# Patient Record
Sex: MALE | Race: AMERICAN INDIAN OR ALASKA NATIVE | NOT HISPANIC OR LATINO | URBAN - METROPOLITAN AREA
[De-identification: names, ages, dates, MRNs, and addresses within clinical notes are randomized per-mention and may not be internally consistent; named-entity substitution may affect disease eponyms.]

---

## 2017-08-26 ENCOUNTER — INPATIENT (INPATIENT)
Facility: HOSPITAL | Age: 62
LOS: 18 days | Discharge: HOME HEALTH | End: 2017-09-14
Attending: SURGERY | Admitting: SURGERY
Payer: MEDICAID

## 2017-08-26 VITALS
OXYGEN SATURATION: 100 % | HEIGHT: 65 IN | DIASTOLIC BLOOD PRESSURE: 86 MMHG | SYSTOLIC BLOOD PRESSURE: 137 MMHG | WEIGHT: 135.58 LBS | HEART RATE: 94 BPM | RESPIRATION RATE: 17 BRPM | TEMPERATURE: 98 F

## 2017-08-26 DIAGNOSIS — K55.9 VASCULAR DISORDER OF INTESTINE, UNSPECIFIED: ICD-10-CM

## 2017-08-26 DIAGNOSIS — Z00.00 ENCOUNTER FOR GENERAL ADULT MEDICAL EXAMINATION WITHOUT ABNORMAL FINDINGS: ICD-10-CM

## 2017-08-26 DIAGNOSIS — D72.829 ELEVATED WHITE BLOOD CELL COUNT, UNSPECIFIED: ICD-10-CM

## 2017-08-26 DIAGNOSIS — K52.9 NONINFECTIVE GASTROENTERITIS AND COLITIS, UNSPECIFIED: ICD-10-CM

## 2017-08-26 DIAGNOSIS — E11.9 TYPE 2 DIABETES MELLITUS WITHOUT COMPLICATIONS: ICD-10-CM

## 2017-08-26 DIAGNOSIS — R33.9 RETENTION OF URINE, UNSPECIFIED: ICD-10-CM

## 2017-08-26 LAB
ALBUMIN SERPL ELPH-MCNC: 3.4 G/DL — SIGNIFICANT CHANGE UP (ref 3.3–5)
ALP SERPL-CCNC: 49 U/L — SIGNIFICANT CHANGE UP (ref 40–120)
ALT FLD-CCNC: 26 U/L — SIGNIFICANT CHANGE UP (ref 4–41)
APPEARANCE UR: CLEAR — SIGNIFICANT CHANGE UP
AST SERPL-CCNC: 23 U/L — SIGNIFICANT CHANGE UP (ref 4–40)
BASE EXCESS BLDV CALC-SCNC: -0.3 MMOL/L — SIGNIFICANT CHANGE UP
BASOPHILS # BLD AUTO: 0.04 K/UL — SIGNIFICANT CHANGE UP (ref 0–0.2)
BASOPHILS NFR BLD AUTO: 0.3 % — SIGNIFICANT CHANGE UP (ref 0–2)
BILIRUB SERPL-MCNC: 0.4 MG/DL — SIGNIFICANT CHANGE UP (ref 0.2–1.2)
BILIRUB UR-MCNC: NEGATIVE — SIGNIFICANT CHANGE UP
BLOOD UR QL VISUAL: NEGATIVE — SIGNIFICANT CHANGE UP
BUN SERPL-MCNC: 6 MG/DL — LOW (ref 7–23)
CALCIUM SERPL-MCNC: 9.2 MG/DL — SIGNIFICANT CHANGE UP (ref 8.4–10.5)
CHLORIDE SERPL-SCNC: 99 MMOL/L — SIGNIFICANT CHANGE UP (ref 98–107)
CO2 SERPL-SCNC: 21 MMOL/L — LOW (ref 22–31)
COLOR SPEC: SIGNIFICANT CHANGE UP
CREAT SERPL-MCNC: 0.78 MG/DL — SIGNIFICANT CHANGE UP (ref 0.5–1.3)
EOSINOPHIL # BLD AUTO: 0.04 K/UL — SIGNIFICANT CHANGE UP (ref 0–0.5)
EOSINOPHIL NFR BLD AUTO: 0.3 % — SIGNIFICANT CHANGE UP (ref 0–6)
GAS PNL BLDV: 137 MMOL/L — SIGNIFICANT CHANGE UP (ref 136–146)
GLUCOSE BLDV-MCNC: 165 — HIGH (ref 70–99)
GLUCOSE SERPL-MCNC: 155 MG/DL — HIGH (ref 70–99)
GLUCOSE UR-MCNC: NEGATIVE — SIGNIFICANT CHANGE UP
HCO3 BLDV-SCNC: 25 MMOL/L — SIGNIFICANT CHANGE UP (ref 20–27)
HCT VFR BLD CALC: 38.2 % — LOW (ref 39–50)
HCT VFR BLDV CALC: 40.6 % — SIGNIFICANT CHANGE UP (ref 39–51)
HGB BLD-MCNC: 12.8 G/DL — LOW (ref 13–17)
HGB BLDV-MCNC: 13.2 G/DL — SIGNIFICANT CHANGE UP (ref 13–17)
IMM GRANULOCYTES # BLD AUTO: 0.05 # — SIGNIFICANT CHANGE UP
IMM GRANULOCYTES NFR BLD AUTO: 0.3 % — SIGNIFICANT CHANGE UP (ref 0–1.5)
KETONES UR-MCNC: SIGNIFICANT CHANGE UP
LACTATE SERPL-SCNC: 0.9 MMOL/L — SIGNIFICANT CHANGE UP (ref 0.5–2)
LEUKOCYTE ESTERASE UR-ACNC: NEGATIVE — SIGNIFICANT CHANGE UP
LYMPHOCYTES # BLD AUTO: 1.38 K/UL — SIGNIFICANT CHANGE UP (ref 1–3.3)
LYMPHOCYTES # BLD AUTO: 9.5 % — LOW (ref 13–44)
MAGNESIUM SERPL-MCNC: 1.8 MG/DL — SIGNIFICANT CHANGE UP (ref 1.6–2.6)
MCHC RBC-ENTMCNC: 27.9 PG — SIGNIFICANT CHANGE UP (ref 27–34)
MCHC RBC-ENTMCNC: 33.5 % — SIGNIFICANT CHANGE UP (ref 32–36)
MCV RBC AUTO: 83.2 FL — SIGNIFICANT CHANGE UP (ref 80–100)
MONOCYTES # BLD AUTO: 1.02 K/UL — HIGH (ref 0–0.9)
MONOCYTES NFR BLD AUTO: 7 % — SIGNIFICANT CHANGE UP (ref 2–14)
MUCOUS THREADS # UR AUTO: SIGNIFICANT CHANGE UP
NEUTROPHILS # BLD AUTO: 12.05 K/UL — HIGH (ref 1.8–7.4)
NEUTROPHILS NFR BLD AUTO: 82.6 % — HIGH (ref 43–77)
NITRITE UR-MCNC: NEGATIVE — SIGNIFICANT CHANGE UP
NRBC # FLD: 0 — SIGNIFICANT CHANGE UP
PCO2 BLDV: 33 MMHG — LOW (ref 41–51)
PH BLDV: 7.46 PH — HIGH (ref 7.32–7.43)
PH UR: 6 — SIGNIFICANT CHANGE UP (ref 4.6–8)
PHOSPHATE SERPL-MCNC: 3.4 MG/DL — SIGNIFICANT CHANGE UP (ref 2.5–4.5)
PLATELET # BLD AUTO: 373 K/UL — SIGNIFICANT CHANGE UP (ref 150–400)
PMV BLD: 8.8 FL — SIGNIFICANT CHANGE UP (ref 7–13)
PO2 BLDV: 76 MMHG — HIGH (ref 35–40)
POTASSIUM BLDV-SCNC: 3.2 MMOL/L — LOW (ref 3.4–4.5)
POTASSIUM SERPL-MCNC: 3.5 MMOL/L — SIGNIFICANT CHANGE UP (ref 3.5–5.3)
POTASSIUM SERPL-SCNC: 3.5 MMOL/L — SIGNIFICANT CHANGE UP (ref 3.5–5.3)
PROT SERPL-MCNC: 7.2 G/DL — SIGNIFICANT CHANGE UP (ref 6–8.3)
PROT UR-MCNC: NEGATIVE — SIGNIFICANT CHANGE UP
RBC # BLD: 4.59 M/UL — SIGNIFICANT CHANGE UP (ref 4.2–5.8)
RBC # FLD: 14 % — SIGNIFICANT CHANGE UP (ref 10.3–14.5)
RBC CASTS # UR COMP ASSIST: SIGNIFICANT CHANGE UP (ref 0–?)
SAO2 % BLDV: 95.9 % — HIGH (ref 60–85)
SODIUM SERPL-SCNC: 138 MMOL/L — SIGNIFICANT CHANGE UP (ref 135–145)
SP GR SPEC: 1.01 — SIGNIFICANT CHANGE UP (ref 1–1.03)
UROBILINOGEN FLD QL: NORMAL E.U. — SIGNIFICANT CHANGE UP (ref 0.1–0.2)
WBC # BLD: 14.58 K/UL — HIGH (ref 3.8–10.5)
WBC # FLD AUTO: 14.58 K/UL — HIGH (ref 3.8–10.5)
WBC UR QL: SIGNIFICANT CHANGE UP (ref 0–?)

## 2017-08-26 PROCEDURE — 99223 1ST HOSP IP/OBS HIGH 75: CPT | Mod: GC

## 2017-08-26 RX ORDER — DEXTROSE 50 % IN WATER 50 %
12.5 SYRINGE (ML) INTRAVENOUS ONCE
Qty: 0 | Refills: 0 | Status: DISCONTINUED | OUTPATIENT
Start: 2017-08-26 | End: 2017-09-14

## 2017-08-26 RX ORDER — INSULIN LISPRO 100/ML
VIAL (ML) SUBCUTANEOUS
Qty: 0 | Refills: 0 | Status: DISCONTINUED | OUTPATIENT
Start: 2017-08-26 | End: 2017-08-28

## 2017-08-26 RX ORDER — ACETAMINOPHEN 500 MG
650 TABLET ORAL EVERY 6 HOURS
Qty: 0 | Refills: 0 | Status: DISCONTINUED | OUTPATIENT
Start: 2017-08-26 | End: 2017-09-08

## 2017-08-26 RX ORDER — SODIUM CHLORIDE 9 MG/ML
1000 INJECTION INTRAMUSCULAR; INTRAVENOUS; SUBCUTANEOUS
Qty: 0 | Refills: 0 | Status: DISCONTINUED | OUTPATIENT
Start: 2017-08-26 | End: 2017-09-03

## 2017-08-26 RX ORDER — DEXTROSE 50 % IN WATER 50 %
1 SYRINGE (ML) INTRAVENOUS ONCE
Qty: 0 | Refills: 0 | Status: DISCONTINUED | OUTPATIENT
Start: 2017-08-26 | End: 2017-09-14

## 2017-08-26 RX ORDER — PIPERACILLIN AND TAZOBACTAM 4; .5 G/20ML; G/20ML
3.38 INJECTION, POWDER, LYOPHILIZED, FOR SOLUTION INTRAVENOUS EVERY 8 HOURS
Qty: 0 | Refills: 0 | Status: DISCONTINUED | OUTPATIENT
Start: 2017-08-26 | End: 2017-08-27

## 2017-08-26 RX ORDER — TAMSULOSIN HYDROCHLORIDE 0.4 MG/1
0.4 CAPSULE ORAL AT BEDTIME
Qty: 0 | Refills: 0 | Status: DISCONTINUED | OUTPATIENT
Start: 2017-08-26 | End: 2017-09-14

## 2017-08-26 RX ORDER — SODIUM CHLORIDE 9 MG/ML
1000 INJECTION, SOLUTION INTRAVENOUS
Qty: 0 | Refills: 0 | Status: DISCONTINUED | OUTPATIENT
Start: 2017-08-26 | End: 2017-09-12

## 2017-08-26 RX ORDER — DEXTROSE 50 % IN WATER 50 %
25 SYRINGE (ML) INTRAVENOUS ONCE
Qty: 0 | Refills: 0 | Status: DISCONTINUED | OUTPATIENT
Start: 2017-08-26 | End: 2017-09-14

## 2017-08-26 RX ORDER — INSULIN LISPRO 100/ML
VIAL (ML) SUBCUTANEOUS AT BEDTIME
Qty: 0 | Refills: 0 | Status: DISCONTINUED | OUTPATIENT
Start: 2017-08-26 | End: 2017-08-28

## 2017-08-26 RX ORDER — GLUCAGON INJECTION, SOLUTION 0.5 MG/.1ML
1 INJECTION, SOLUTION SUBCUTANEOUS ONCE
Qty: 0 | Refills: 0 | Status: DISCONTINUED | OUTPATIENT
Start: 2017-08-26 | End: 2017-09-14

## 2017-08-26 RX ADMIN — PIPERACILLIN AND TAZOBACTAM 25 GRAM(S): 4; .5 INJECTION, POWDER, LYOPHILIZED, FOR SOLUTION INTRAVENOUS at 23:12

## 2017-08-26 RX ADMIN — SODIUM CHLORIDE 75 MILLILITER(S): 9 INJECTION INTRAMUSCULAR; INTRAVENOUS; SUBCUTANEOUS at 22:40

## 2017-08-26 RX ADMIN — TAMSULOSIN HYDROCHLORIDE 0.4 MILLIGRAM(S): 0.4 CAPSULE ORAL at 22:40

## 2017-08-26 NOTE — H&P ADULT - NSHPREVIEWOFSYSTEMS_GEN_ALL_CORE
CONSTITUTIONAL:  No weight loss, fever, chills, weakness or fatigue.  HEENT:  Eyes:  No visual loss, blurred vision, double vision or yellow sclerae No hearing loss, sneezing, congestion, runny nose or sore throat.  SKIN:  No rash or itching.  CARDIOVASCULAR:  No chest pain, chest pressure or chest discomfort. No palpitations or edema.  RESPIRATORY:  No shortness of breath, cough or sputum.  GASTROINTESTINAL:  No nausea, vomiting or diarrhea/constipation. No abdominal pain, melena, hematochezia recently.   GENITOURINARY:  Denies hematuria, dysuria.   NEUROLOGICAL:  No headache, dizziness, syncope, paralysis, ataxia, numbness or tingling in the extremities. No change in bowel or bladder control.  MUSCULOSKELETAL:  No muscle, back pain, joint pain or stiffness.  HEMATOLOGIC:  No easy bleeding or bruising.  LYMPHATICS:  No enlarged nodes.   PSYCHIATRIC:  No history of depression or anxiety.  ENDOCRINOLOGIC:  No reports of sweating, cold or heat intolerance. No polyuria or polydipsia. CONSTITUTIONAL:  +weight loss of 15 pounds over 3 months, fever, chills, weakness or fatigue.  HEENT:  Eyes:  No visual loss, blurred vision, double vision or yellow sclerae No hearing loss, sneezing, congestion, runny nose or sore throat.  SKIN:  No rash or itching.  CARDIOVASCULAR:  No chest pain, chest pressure or chest discomfort. No palpitations or edema.  RESPIRATORY:  No shortness of breath, cough or sputum.  GASTROINTESTINAL:  No nausea, vomiting or diarrhea/constipation. +abdominal pain, melena. + hematochezia.   GENITOURINARY:  Denies hematuria, dysuria, +urinary hesitancy   NEUROLOGICAL:  No headache, dizziness, syncope, paralysis, ataxia, numbness or tingling in the extremities. No change in bowel or bladder control.  MUSCULOSKELETAL:  No muscle, back pain, joint pain or stiffness.  HEMATOLOGIC:  No easy bleeding or bruising.  LYMPHATICS:  No enlarged nodes.   PSYCHIATRIC:  No history of depression or anxiety.  ENDOCRINOLOGIC:  No reports of sweating, cold or heat intolerance. No polyuria or polydipsia.

## 2017-08-26 NOTE — H&P ADULT - NSHPPHYSICALEXAM_GEN_ALL_CORE
GENERAL APPEARANCE: Well developed, well nourished, alert and cooperative. NAD.   HEENT:  PERRL, EOMI. External auditory canals normal, hearing grossly intact.  NECK: Neck supple, non-tender without lymphadenopathy, masses or thyromegaly.  CARDIAC: Normal S1 and S2. No S3, S4 or murmurs. Rhythm is regular.  LUNGS: Clear to auscultation and percussion without rales, rhonchi, wheezing or diminished breath sounds.  ABDOMEN: Positive bowel sounds. Soft, nondistended, nontender. No guarding or rebound.   MUSKULOSKELETAL: ROM intact spine and extremities. No joint erythema or tenderness.   BACK: normal gait and posture, no spinal deformity, symmetry of spinal muscles, without tenderness, decreased range of motion.  EXTREMITIES: No significant deformity or joint abnormality. No edema. Peripheral pulses intact. No varicosities.  NEUROLOGICAL: CN II-XII intact. Strength and sensation symmetric and intact throughout.   SKIN: Skin normal color, texture and turgor with no lesions or eruptions.  PSYCHIATRIC: AOx3.Normal affect and behavior. GENERAL APPEARANCE: Well developed, well nourished, alert and cooperative. NAD.   HEENT:  PERRL, EOMI. External auditory canals normal, Neck supple, non-tender without lymphadenopathy, masses or thyromegaly.  CARDIAC: Normal S1 and S2. No S3, S4 or murmurs. Rhythm is regular.  LUNGS: Clear to auscultation and percussion without rales, rhonchi, wheezing or diminished breath sounds.  ABDOMEN: Positive bowel sounds. Soft, nondistended, tender in LLQ, no guarding, no rebound tenderness.   MUSKULOSKELETAL: ROM intact spine and extremities. No joint erythema or tenderness.   BACK: normal gait and posture, no spinal deformity, symmetry of spinal muscles, without tenderness, decreased range of motion.  EXTREMITIES: No significant deformity or joint abnormality. No edema. Peripheral pulses intact. No varicosities.  NEUROLOGICAL: CN II-XII intact. Strength and sensation symmetric and intact throughout.   SKIN: Skin normal color, texture and turgor with no lesions or eruptions.  PSYCHIATRIC: AOx3.Normal affect and behavior. Vital Signs Last 24 Hrs  T(C): 37.2 (26 Aug 2017 21:16), Max: 37.2 (26 Aug 2017 21:16)  T(F): 98.9 (26 Aug 2017 21:16), Max: 98.9 (26 Aug 2017 21:16)  HR: 105 (26 Aug 2017 21:16) (94 - 105)  BP: 130/86 (26 Aug 2017 21:16) (130/86 - 137/86)  BP(mean): --  RR: 17 (26 Aug 2017 21:16) (17 - 17)  SpO2: 99% (26 Aug 2017 21:16) (99% - 100%)    GENERAL APPEARANCE: Well developed, well nourished, alert and cooperative. NAD.   HEENT:  PERRL, EOMI. External auditory canals normal, Neck supple, non-tender without lymphadenopathy, masses or thyromegaly.  CARDIAC: Normal S1 and S2. No S3, S4 or murmurs. Rhythm is regular.  LUNGS: Clear to auscultation and percussion without rales, rhonchi, wheezing or diminished breath sounds.  ABDOMEN: Positive bowel sounds. Soft, nondistended, tender in LLQ, no guarding, no rebound tenderness.   MUSCULOSKELETAL: ROM intact spine and extremities. No joint erythema or tenderness.   BACK: normal gait and posture, no spinal deformity, symmetry of spinal muscles, without tenderness, decreased range of motion.  EXTREMITIES: No significant deformity or joint abnormality. No edema. Peripheral pulses intact. No varicosities.  NEUROLOGICAL: CN II-XII intact. Strength and sensation symmetric and intact throughout.   SKIN: Skin normal color, texture and turgor with no lesions or eruptions.  PSYCHIATRIC: AOx3.Normal affect and behavior.

## 2017-08-26 NOTE — H&P ADULT - PROBLEM SELECTOR PLAN 3
Likely due to BPH  -will follow up with urology as outpatient per [facility] notes  -continue with flomax -hold metformin  -check hgba1c in AM  -continue with sliding scale and check FS TID and qhs

## 2017-08-26 NOTE — H&P ADULT - NSHPLABSRESULTS_GEN_ALL_CORE
Vital Signs Last 24 Hrs  T(C): 37.2 (26 Aug 2017 21:16), Max: 37.2 (26 Aug 2017 21:16)  T(F): 98.9 (26 Aug 2017 21:16), Max: 98.9 (26 Aug 2017 21:16)  HR: 105 (26 Aug 2017 21:16) (94 - 105)  BP: 130/86 (26 Aug 2017 21:16) (130/86 - 137/86)  BP(mean): --  RR: 17 (26 Aug 2017 21:16) (17 - 17)  SpO2: 99% (26 Aug 2017 21:16) (99% - 100%)  CAPILLARY BLOOD GLUCOSE      LABS:                        12.8   14.58 )-----------( 373      ( 26 Aug 2017 18:37 )             38.2     Auto Eosinophil # 0.04  / Auto Eosinophil % 0.3   / Auto Neutrophil # 12.05 / Auto Neutrophil % 82.6  / BANDS % x            138  |  99  |  6<L>  ----------------------------<  155<H>  3.5   |  21<L>  |  0.78    Ca    9.2      26 Aug 2017 18:37  Mg     1.8       Phos  3.4       TPro  7.2  /  Alb  3.4  /  TBili  0.4  /  DBili  x   /  AST  23  /  ALT  26  /  AlkPhos  49        Urinalysis Basic - ( 26 Aug 2017 20:10 )    Color: PLYEL / Appearance: CLEAR / S.011 / pH: 6.0  Gluc: NEGATIVE / Ketone: SMALL  / Bili: NEGATIVE / Urobili: NORMAL E.U.   Blood: NEGATIVE / Protein: NEGATIVE / Nitrite: NEGATIVE   Leuk Esterase: NEGATIVE / RBC: 0-2 / WBC 0-2   Sq Epi: x / Non Sq Epi: x / Bacteria: x      Lactate, Blood: 0.9 mmol/L ( @ 18:37)      RADIOLOGY & ADDITIONAL TESTS:    Imaging Personally Reviewed:    Consultant(s) Notes Reviewed:      Care Discussed with Consultants/Other Providers: LABS:                        12.8   14.58 )-----------( 373      ( 26 Aug 2017 18:37 )             38.2     Auto Eosinophil # 0.04  / Auto Eosinophil % 0.3   / Auto Neutrophil # 12.05 / Auto Neutrophil % 82.6  / BANDS % x            138  |  99  |  6<L>  ----------------------------<  155<H>  3.5   |  21<L>  |  0.78    Ca    9.2      26 Aug 2017 18:37  Mg     1.8       Phos  3.4       TPro  7.2  /  Alb  3.4  /  TBili  0.4  /  DBili  x   /  AST  23  /  ALT    /  AlkPhos  49        Urinalysis Basic - ( 26 Aug 2017 20:10 )    Color: PLYEL / Appearance: CLEAR / S.011 / pH: 6.0  Gluc: NEGATIVE / Ketone: SMALL  / Bili: NEGATIVE / Urobili: NORMAL E.U.   Blood: NEGATIVE / Protein: NEGATIVE / Nitrite: NEGATIVE   Leuk Esterase: NEGATIVE / RBC: 0-2 / WBC 0-2   Sq Epi: x / Non Sq Epi: x / Bacteria: x      Lactate, Blood: 0.9 mmol/L ( @ 18:37)      RADIOLOGY & ADDITIONAL TESTS:    Reviewed medical records from [facility]

## 2017-08-26 NOTE — H&P ADULT - FAMILY HISTORY
Sibling  Still living? Unknown  Family history of diabetes mellitus in brother, Age at diagnosis: Age Unknown

## 2017-08-26 NOTE — H&P ADULT - PMH
Diabetes mellitus type 2, noninsulin dependent Diabetes mellitus type 2, noninsulin dependent    Ischemic colitis

## 2017-08-26 NOTE — H&P ADULT - PROBLEM SELECTOR PLAN 2
-hold metformin  -check hgba1c in AM  -continue with sliding scale and check FS TID and qhs Likely reactive in the setting of ischemic colitis. No signs of infection. Per [facility] notes, patient has a persistent leukocytosis from 13-14  -will monitor CBC

## 2017-08-26 NOTE — H&P ADULT - PROBLEM SELECTOR PLAN 4
DVT ppx: Pt is ambulatory. Will avoid pharmacological agents as pt also has BRBPR Likely due to BPH  -will follow up with urology as outpatient per [facility] notes  -continue with flomax

## 2017-08-26 NOTE — H&P ADULT - ASSESSMENT
62yom PMHx of DM2 (on metformin) presents as a transfer from [facility] for ischemic colitis.

## 2017-08-26 NOTE — H&P ADULT - HISTORY OF PRESENT ILLNESS
62yom PMHx of DM2 (on metformin) presents as a transfer from [facility].           Pt had a colonoscopy 7/24 for evaluation for evaluation of hemorrhoids and lower abdominal/rectal discomfort. The colonoscopy ( by Gastroenterologist Dr. Wolfe)  showed sigmoid thickening and biopsies showed findings concerning for ischemic colitis. Pt was admitted to [facility] for further evaluation on 8/16. Per chart, CT A/P showed sigmoid colitis/proctitis, grossly patent mesenteric arterial vasculature. No acute mesenteric venous thrombosis seen. On 8/17: patient had a sigmoidoscopy by Dr. Leija which showed congested erythematous friable inflamed ulcerated mucosa and biopsy of that also showed ischemic colitis and erosion. 8/20 CT A/P showed worsening colitis-no fluid collection or abscess. Per chart, patient had no melena/bloody bowel movements since admission to [facility].      Of note, CTA/P also showed left renal cyst vs mass and patient was recommended to follow up with Dr. Aguiar (urology) as outpatient. His course was complicated by urinary retention now with suprapubic catheter. 62yom PMHx of DM2 (on metformin) presents as a transfer from [facility] for ischemic colitis.           Pt had a colonoscopy 7/24 for evaluation for evaluation of hemorrhoids and lower abdominal/rectal discomfort. The colonoscopy ( by Gastroenterologist Dr. Wolfe)  showed sigmoid thickening and biopsies showed findings concerning for ischemic colitis. Pt was admitted to [facility] for further evaluation on 8/16. Per chart, CT A/P showed sigmoid colitis/proctitis, grossly patent mesenteric arterial vasculature. No acute mesenteric venous thrombosis seen. On 8/17: patient had a sigmoidoscopy by Dr. Leija which showed congested erythematous friable inflamed ulcerated mucosa and biopsy of that also showed ischemic colitis and erosion. 8/20 CT A/P showed worsening colitis-no fluid collection or abscess. Per chart, patient had no melena/bloody bowel movements since admission to [facility].      Of note, CTA/P also showed left renal cyst vs mass and patient was recommended to follow up with Dr. Aguiar (urology) as outpatient. His course was complicated by urinary retention now with suprapubic catheter. 62yom PMHx of DM2 (on metformin) presents as a transfer from [facility] for ischemic colitis.           Pt had a colonoscopy 7/24 for evaluation for evaluation of hemorrhoids and lower abdominal/rectal discomfort. The colonoscopy ( by Gastroenterologist Dr. Wolfe)  showed sigmoid thickening and biopsies showed findings concerning for ischemic colitis. Pt was admitted to [facility] for further evaluation on 8/16. Per chart, CT A/P showed sigmoid colitis/proctitis, grossly patent mesenteric arterial vasculature. No acute mesenteric venous thrombosis seen. On 8/17: patient had a sigmoidoscopy by Dr. Leija which showed congested erythematous friable inflamed ulcerated mucosa and biopsy of that also showed ischemic colitis and erosion. 8/20 CT A/P showed worsening colitis-no fluid collection or abscess. Per chart, patient had no melena/bloody bowel movements since admission to [facility].      Pt describes his symptoms as a heavy discomfort in LLQ radiating to the lower abdominal area. He has intermittent BRBPR on diapers and toilet paper but no nausea vomiting, melena. He started having diarrhea last week while at [facility]-per notes cdiff was negative. He is having 1-2 watery brown BM a day with intermittent BRBPR. He also has been having mucous in his bowel movements. These symptoms have been going on for 3 months total.   Of note, CTA/P also showed left renal cyst vs mass and patient was recommended to follow up with Dr. Aguiar (urology) as outpatient. His course was complicated by urinary retention now started on flomax. 62yom PMHx of DM2 (on metformin) presents as a transfer from [facility] for ischemic colitis.         Pt had a colonoscopy 7/24 for evaluation for evaluation of hemorrhoids and lower abdominal/rectal discomfort. The colonoscopy ( by Gastroenterologist Dr. Wolfe)  showed sigmoid thickening and biopsies showed findings concerning for ischemic colitis. Pt was admitted to [facility] for further evaluation on 8/16. Per chart, CT A/P showed sigmoid colitis/proctitis, grossly patent mesenteric arterial vasculature. No acute mesenteric venous thrombosis seen. On 8/17: patient had a sigmoidoscopy by Dr. Leija which showed congested erythematous friable inflamed ulcerated mucosa and biopsy of that also showed ischemic colitis and erosion. 8/20 CT A/P showed worsening colitis-no fluid collection or abscess. Per chart, patient had no melena/bloody bowel movements since admission to [facility].      Pt describes his symptoms as a heavy discomfort in LLQ radiating to the lower abdominal area. He has intermittent BRBPR on diapers and toilet paper but no nausea vomiting, melena. He started having diarrhea last week while at [facility]-per notes cdiff was negative. He is having 1-2 watery brown BM a day with intermittent BRBPR. He also has been having mucous in his bowel movements. These symptoms have been going on for 3 months total.   Of note, CTA/P also showed left renal cyst vs mass and patient was recommended to follow up with Dr. Aguiar (urology) as outpatient. His course was complicated by urinary retention now started on flomax.

## 2017-08-26 NOTE — H&P ADULT - PROBLEM SELECTOR PLAN 1
Patient does not want surgery and came to [facility] for a second opinion regarding non surgical options. Lactate normal. Pt not hypotensve, not tachycardic. BUN is < 20, WBC < 15.  -GI consult  -IVF for colonic perfusion  -pt was tolerating a clear liquid diet-will continue for now  -zosyn q8  -monitor leukocytosis  -check LDH in AM for risk stratification Patient does not want surgery and came to [facility] for a second opinion regarding non surgical options. Lactate normal. Pt not hypotensive, BUN is < 20, WBC < 15. Will need to evaluate for etiology of ischemic colitis.   -will need CT angio A/P  -GI consult  -IVF for colonic perfusion  -pt was tolerating a clear liquid diet-will continue for now  -zosyn q8  -monitor leukocytosis  -check LDH in AM for risk stratification Patient does not want surgery and came to [facility] for a second opinion regarding non surgical options. Lactate normal. Pt not hypotensive, BUN is < 20, WBC < 15. Unclear etiology: CTA a/p did not show any occlusive disease.   -GI consult  -IVF for colonic perfusion  -pt was tolerating a clear liquid diet-will continue for now  -zosyn q8  -monitor leukocytosis  -check LDH in AM for risk stratification

## 2017-08-27 LAB
ALBUMIN SERPL ELPH-MCNC: 3.3 G/DL — SIGNIFICANT CHANGE UP (ref 3.3–5)
ALP SERPL-CCNC: 53 U/L — SIGNIFICANT CHANGE UP (ref 40–120)
ALT FLD-CCNC: 25 U/L — SIGNIFICANT CHANGE UP (ref 4–41)
AST SERPL-CCNC: 19 U/L — SIGNIFICANT CHANGE UP (ref 4–40)
BASOPHILS # BLD AUTO: 0.03 K/UL — SIGNIFICANT CHANGE UP (ref 0–0.2)
BASOPHILS NFR BLD AUTO: 0.2 % — SIGNIFICANT CHANGE UP (ref 0–2)
BILIRUB SERPL-MCNC: 0.3 MG/DL — SIGNIFICANT CHANGE UP (ref 0.2–1.2)
BLD GP AB SCN SERPL QL: NEGATIVE — SIGNIFICANT CHANGE UP
BUN SERPL-MCNC: 6 MG/DL — LOW (ref 7–23)
CALCIUM SERPL-MCNC: 8.9 MG/DL — SIGNIFICANT CHANGE UP (ref 8.4–10.5)
CHLORIDE SERPL-SCNC: 98 MMOL/L — SIGNIFICANT CHANGE UP (ref 98–107)
CO2 SERPL-SCNC: 25 MMOL/L — SIGNIFICANT CHANGE UP (ref 22–31)
CREAT SERPL-MCNC: 0.87 MG/DL — SIGNIFICANT CHANGE UP (ref 0.5–1.3)
EOSINOPHIL # BLD AUTO: 0.06 K/UL — SIGNIFICANT CHANGE UP (ref 0–0.5)
EOSINOPHIL NFR BLD AUTO: 0.5 % — SIGNIFICANT CHANGE UP (ref 0–6)
GLUCOSE SERPL-MCNC: 149 MG/DL — HIGH (ref 70–99)
HBA1C BLD-MCNC: 6.7 % — HIGH (ref 4–5.6)
HCT VFR BLD CALC: 37.7 % — LOW (ref 39–50)
HGB BLD-MCNC: 12.7 G/DL — LOW (ref 13–17)
IMM GRANULOCYTES # BLD AUTO: 0.05 # — SIGNIFICANT CHANGE UP
IMM GRANULOCYTES NFR BLD AUTO: 0.4 % — SIGNIFICANT CHANGE UP (ref 0–1.5)
LDH SERPL L TO P-CCNC: 183 U/L — SIGNIFICANT CHANGE UP (ref 135–225)
LYMPHOCYTES # BLD AUTO: 1.28 K/UL — SIGNIFICANT CHANGE UP (ref 1–3.3)
LYMPHOCYTES # BLD AUTO: 10.3 % — LOW (ref 13–44)
MAGNESIUM SERPL-MCNC: 1.9 MG/DL — SIGNIFICANT CHANGE UP (ref 1.6–2.6)
MCHC RBC-ENTMCNC: 28.5 PG — SIGNIFICANT CHANGE UP (ref 27–34)
MCHC RBC-ENTMCNC: 33.7 % — SIGNIFICANT CHANGE UP (ref 32–36)
MCV RBC AUTO: 84.7 FL — SIGNIFICANT CHANGE UP (ref 80–100)
MONOCYTES # BLD AUTO: 0.89 K/UL — SIGNIFICANT CHANGE UP (ref 0–0.9)
MONOCYTES NFR BLD AUTO: 7.2 % — SIGNIFICANT CHANGE UP (ref 2–14)
NEUTROPHILS # BLD AUTO: 10.08 K/UL — HIGH (ref 1.8–7.4)
NEUTROPHILS NFR BLD AUTO: 81.4 % — HIGH (ref 43–77)
NRBC # FLD: 0 — SIGNIFICANT CHANGE UP
PHOSPHATE SERPL-MCNC: 3.8 MG/DL — SIGNIFICANT CHANGE UP (ref 2.5–4.5)
PLATELET # BLD AUTO: 347 K/UL — SIGNIFICANT CHANGE UP (ref 150–400)
PMV BLD: 8.8 FL — SIGNIFICANT CHANGE UP (ref 7–13)
POTASSIUM SERPL-MCNC: 4.2 MMOL/L — SIGNIFICANT CHANGE UP (ref 3.5–5.3)
POTASSIUM SERPL-SCNC: 4.2 MMOL/L — SIGNIFICANT CHANGE UP (ref 3.5–5.3)
PROT SERPL-MCNC: 7 G/DL — SIGNIFICANT CHANGE UP (ref 6–8.3)
RBC # BLD: 4.45 M/UL — SIGNIFICANT CHANGE UP (ref 4.2–5.8)
RBC # FLD: 14 % — SIGNIFICANT CHANGE UP (ref 10.3–14.5)
RH IG SCN BLD-IMP: POSITIVE — SIGNIFICANT CHANGE UP
SODIUM SERPL-SCNC: 138 MMOL/L — SIGNIFICANT CHANGE UP (ref 135–145)
WBC # BLD: 12.39 K/UL — HIGH (ref 3.8–10.5)
WBC # FLD AUTO: 12.39 K/UL — HIGH (ref 3.8–10.5)

## 2017-08-27 PROCEDURE — 99233 SBSQ HOSP IP/OBS HIGH 50: CPT

## 2017-08-27 RX ORDER — MORPHINE SULFATE 50 MG/1
2 CAPSULE, EXTENDED RELEASE ORAL EVERY 4 HOURS
Qty: 0 | Refills: 0 | Status: DISCONTINUED | OUTPATIENT
Start: 2017-08-27 | End: 2017-08-31

## 2017-08-27 RX ORDER — MORPHINE SULFATE 50 MG/1
4 CAPSULE, EXTENDED RELEASE ORAL EVERY 4 HOURS
Qty: 0 | Refills: 0 | Status: DISCONTINUED | OUTPATIENT
Start: 2017-08-27 | End: 2017-08-28

## 2017-08-27 RX ADMIN — PIPERACILLIN AND TAZOBACTAM 25 GRAM(S): 4; .5 INJECTION, POWDER, LYOPHILIZED, FOR SOLUTION INTRAVENOUS at 06:08

## 2017-08-27 RX ADMIN — Medication 650 MILLIGRAM(S): at 02:06

## 2017-08-27 RX ADMIN — Medication 650 MILLIGRAM(S): at 21:20

## 2017-08-27 RX ADMIN — PIPERACILLIN AND TAZOBACTAM 25 GRAM(S): 4; .5 INJECTION, POWDER, LYOPHILIZED, FOR SOLUTION INTRAVENOUS at 13:14

## 2017-08-27 RX ADMIN — SODIUM CHLORIDE 75 MILLILITER(S): 9 INJECTION INTRAMUSCULAR; INTRAVENOUS; SUBCUTANEOUS at 21:20

## 2017-08-27 RX ADMIN — Medication 650 MILLIGRAM(S): at 22:00

## 2017-08-27 RX ADMIN — SODIUM CHLORIDE 75 MILLILITER(S): 9 INJECTION INTRAMUSCULAR; INTRAVENOUS; SUBCUTANEOUS at 13:14

## 2017-08-27 RX ADMIN — Medication 1: at 17:25

## 2017-08-27 RX ADMIN — TAMSULOSIN HYDROCHLORIDE 0.4 MILLIGRAM(S): 0.4 CAPSULE ORAL at 21:20

## 2017-08-27 RX ADMIN — Medication 650 MILLIGRAM(S): at 03:00

## 2017-08-27 NOTE — CONSULT NOTE ADULT - SUBJECTIVE AND OBJECTIVE BOX
Chief Complaint:  Patient is a 62y old  Male who presents with a chief complaint of ischemic colitis (26 Aug 2017 21:19)      HPI:  62M with DM2 (on Metformin), HTN presenting after recent admission at [facility] for second opinion regarding ischemic colitis.     Allergies:  No Known Allergies      Home Medications:    Hospital Medications:  sodium chloride 0.9%. 1000 milliLiter(s) IV Continuous <Continuous>  acetaminophen   Tablet 650 milliGRAM(s) Oral every 6 hours PRN  acetaminophen   Tablet. 650 milliGRAM(s) Oral every 6 hours PRN  tamsulosin 0.4 milliGRAM(s) Oral at bedtime  piperacillin/tazobactam IVPB. 3.375 Gram(s) IV Intermittent every 8 hours  insulin lispro (HumaLOG) corrective regimen sliding scale   SubCutaneous three times a day before meals  insulin lispro (HumaLOG) corrective regimen sliding scale   SubCutaneous at bedtime  dextrose 5%. 1000 milliLiter(s) IV Continuous <Continuous>  dextrose Gel 1 Dose(s) Oral once PRN  dextrose 50% Injectable 12.5 Gram(s) IV Push once  dextrose 50% Injectable 25 Gram(s) IV Push once  dextrose 50% Injectable 25 Gram(s) IV Push once  glucagon  Injectable 1 milliGRAM(s) IntraMuscular once PRN      PMHX/PSHX:  Ischemic colitis  Diabetes mellitus type 2, noninsulin dependent  No significant past surgical history      Family history:  Family history of diabetes mellitus in brother (Sibling)      Social History:     ROS:     General:  No wt loss, fevers, chills, night sweats, fatigue,   Eyes:  Good vision, no reported pain  ENT:  No sore throat, pain, runny nose, dysphagia  CV:  No pain, palpitations, hypo/hypertension  Resp:  No dyspnea, cough, tachypnea, wheezing  GI:  No pain, No nausea, No vomiting, No diarrhea, No constipation, No weight loss, No fever, No pruritis, No rectal bleeding, No tarry stools, No dysphagia,  :  No pain, bleeding, incontinence, nocturia  Muscle:  No pain, weakness  Neuro:  No weakness, tingling, memory problems  Psych:  No fatigue, insomnia, mood problems, depression  Endocrine:  No polyuria, polydipsia, cold/heat intolerance  Heme:  No petechiae, ecchymosis, easy bruisability  Skin:  No rash, tattoos, scars, edema      PHYSICAL EXAM:     GENERAL:  Appears stated age, well-groomed, well-nourished, no distress  HEENT:  NC/AT,  conjunctivae clear and pink, no thyromegaly, nodules, adenopathy, no JVD, sclera -anicteric  CHEST:  Full & symmetric excursion, no increased effort, breath sounds clear  HEART:  Regular rhythm, S1, S2, no murmur/rub/S3/S4, no abdominal bruit, no edema  ABDOMEN:  Soft, non-tender, non-distended, normoactive bowel sounds,  no masses ,no hepato-splenomegaly, no signs of chronic liver disease  EXTEREMITIES:  no cyanosis,clubbing or edema  SKIN:  No rash/erythema/ecchymoses/petechiae/wounds/abscess/warm/dry  NEURO:  Alert, oriented, no asterixis, no tremor, no encephalopathy    Vital Signs:  Vital Signs Last 24 Hrs  T(C): 36.8 (27 Aug 2017 10:19), Max: 37.2 (26 Aug 2017 21:16)  T(F): 98.2 (27 Aug 2017 10:19), Max: 98.9 (26 Aug 2017 21:16)  HR: 95 (27 Aug 2017 10:19) (92 - 105)  BP: 131/85 (27 Aug 2017 10:19) (129/80 - 137/86)  BP(mean): --  RR: 18 (27 Aug 2017 10:19) (17 - 18)  SpO2: 100% (27 Aug 2017 10:19) (99% - 100%)  Daily Height in cm: 165.1 (26 Aug 2017 16:46)    Daily Weight in k (27 Aug 2017 06:06)    LABS:                        12.7   12.39 )-----------( 347      ( 27 Aug 2017 06:13 )             37.7     Mean Cell Volume: 84.7 fL (-17 @ 06:13)        138  |  98  |  6<L>  ----------------------------<  149<H>  4.2   |  25  |  0.87    Ca    8.9      27 Aug 2017 06:13  Phos  3.8       Mg     1.9         TPro  7.0  /  Alb  3.3  /  TBili  0.3  /  DBili  x   /  AST  19  /  ALT  25  /  AlkPhos  53  08-27    LIVER FUNCTIONS - ( 27 Aug 2017 06:13 )  Alb: 3.3 g/dL / Pro: 7.0 g/dL / ALK PHOS: 53 u/L / ALT: 25 u/L / AST: 19 u/L / GGT: x             Urinalysis Basic - ( 26 Aug 2017 20:10 )    Color: PLYEL / Appearance: CLEAR / S.011 / pH: 6.0  Gluc: NEGATIVE / Ketone: SMALL  / Bili: NEGATIVE / Urobili: NORMAL E.U.   Blood: NEGATIVE / Protein: NEGATIVE / Nitrite: NEGATIVE   Leuk Esterase: NEGATIVE / RBC: 0-2 / WBC 0-2   Sq Epi: x / Non Sq Epi: x / Bacteria: x                              12.7   12.39 )-----------( 347      ( 27 Aug 2017 06:13 )             37.7                         12.8   14.58 )-----------( 373      ( 26 Aug 2017 18:37 )             38.2     Imaging: Chief Complaint:  Patient is a 62y old  Male who presents with a chief complaint of ischemic colitis (26 Aug 2017 21:19)      HPI:  62M with DM2 (on Metformin) with peripheral neuropathy, HTN presenting after [facility] admission [facility] for [facility] opinion regarding ischemic colitis. The patient complains of 2-3 months of cramping lower abdominal pain associated with intermittent passage of mucus and blood in bowel movements. He denies any fevers or chills and states that despite being seen by outpatient GI and inpatient GI at [facility], he is having persistent symptoms. Over the past week has has noticed loose stools ~1-2x per day. His lower abdominal pain is cramping in nature and unchanged. He has a poor appetite but is able to tolerate the clear liquid diet he has been prescribed.     As per records the patient had colonoscopy by Dr. Wolfe as an outpatient , at which time was found to have endoscopic findings and biopsies consistent with ischemic colitis of the sigmoid colon.  The patient was then admitted to [facility] for similar symptoms  at which time he underwent CT A/P showing sigmoid colitis/proctitis but grossly patent mesenteric arterial vasculature (unsure if CTA performed). On  patient underwent flex sig with biopsies again consistent with ischemic colitis. A repeat CT  showed worsening colitis but no fluid collections or abscesses.     As per patient, he was treated with 1 week course of antibiotics at [facility]. He denies any lower extremity claudication but does not neuropathy related to DM2.        Allergies:  No Known Allergies      Home Medications: reviewed     Hospital Medications:  sodium chloride 0.9%. 1000 milliLiter(s) IV Continuous <Continuous>  acetaminophen   Tablet 650 milliGRAM(s) Oral every 6 hours PRN  acetaminophen   Tablet. 650 milliGRAM(s) Oral every 6 hours PRN  tamsulosin 0.4 milliGRAM(s) Oral at bedtime  piperacillin/tazobactam IVPB. 3.375 Gram(s) IV Intermittent every 8 hours  insulin lispro (HumaLOG) corrective regimen sliding scale   SubCutaneous three times a day before meals  insulin lispro (HumaLOG) corrective regimen sliding scale   SubCutaneous at bedtime  dextrose 5%. 1000 milliLiter(s) IV Continuous <Continuous>  dextrose Gel 1 Dose(s) Oral once PRN  dextrose 50% Injectable 12.5 Gram(s) IV Push once  dextrose 50% Injectable 25 Gram(s) IV Push once  dextrose 50% Injectable 25 Gram(s) IV Push once  glucagon  Injectable 1 milliGRAM(s) IntraMuscular once PRN      PMHX/PSHX:  Ischemic colitis  Diabetes mellitus type 2, noninsulin dependent  No significant past surgical history      Family history:  Family history of diabetes mellitus in brother (Sibling)      Social History: no tobacco, no ETOH, no IVDA     ROS:     General:  No wt loss, fevers, chills, night sweats, fatigue,   Eyes:  Good vision, no reported pain  ENT:  No sore throat, pain, runny nose, dysphagia  CV:  No pain, palpitations, hypo/hypertension  Resp:  No dyspnea, cough, tachypnea, wheezing  GI:  + pain, No nausea, No vomiting, + diarrhea, No constipation, No weight loss, No fever, No pruritis, No rectal bleeding, No tarry stools, No dysphagia,  :  No pain, bleeding, incontinence, nocturia  Muscle:  No pain, weakness  Neuro:  No weakness, tingling, memory problems  Psych:  No fatigue, insomnia, mood problems, depression  Endocrine:  No polyuria, polydipsia, cold/heat intolerance  Heme:  No petechiae, ecchymosis, easy bruisability  Skin:  No rash, tattoos, scars, edema      PHYSICAL EXAM:     GENERAL:  Appears stated age, well-groomed, well-nourished, no distress  HEENT:  NC/AT,  conjunctivae clear and pink, no thyromegaly, nodules, adenopathy, no JVD, sclera -anicteric  CHEST:  Full & symmetric excursion, no increased effort, breath sounds clear  HEART:  Regular rhythm, S1, S2, no murmur/rub/S3/S4, no abdominal bruit, no edema  ABDOMEN:  Soft, mild RLQ and LLQ tenderness, non-distended, normoactive bowel sounds,  no masses ,no hepato-splenomegaly, no signs of chronic liver disease  EXTEREMITIES:  no cyanosis,clubbing or edema  SKIN:  No rash/erythema/ecchymoses/petechiae/wounds/abscess/warm/dry  NEURO:  Alert, oriented, no asterixis, no tremor, no encephalopathy    Vital Signs:  Vital Signs Last 24 Hrs  T(C): 36.8 (27 Aug 2017 10:19), Max: 37.2 (26 Aug 2017 21:16)  T(F): 98.2 (27 Aug 2017 10:19), Max: 98.9 (26 Aug 2017 21:16)  HR: 95 (27 Aug 2017 10:19) (92 - 105)  BP: 131/85 (27 Aug 2017 10:19) (129/80 - 137/86)  BP(mean): --  RR: 18 (27 Aug 2017 10:19) (17 - 18)  SpO2: 100% (27 Aug 2017 10:19) (99% - 100%)  Daily Height in cm: 165.1 (26 Aug 2017 16:46)    Daily Weight in k (27 Aug 2017 06:06)    LABS:                        12.7   12.39 )-----------( 347      ( 27 Aug 2017 06:13 )             37.7     Mean Cell Volume: 84.7 fL (17 @ 06:13)        138  |  98  |  6<L>  ----------------------------<  149<H>  4.2   |  25  |  0.87    Ca    8.9      27 Aug 2017 06:13  Phos  3.8       Mg     1.9         TPro  7.0  /  Alb  3.3  /  TBili  0.3  /  DBili  x   /  AST  19  /  ALT  25  /  AlkPhos  53  27    LIVER FUNCTIONS - ( 27 Aug 2017 06:13 )  Alb: 3.3 g/dL / Pro: 7.0 g/dL / ALK PHOS: 53 u/L / ALT: 25 u/L / AST: 19 u/L / GGT: x           Lactate, Blood: 0.9 mmol/L (17 @ 18:37)    Hemoglobin A1C, Whole Blood: 6.7  (17 @ 06:13)        Urinalysis Basic - ( 26 Aug 2017 20:10 )    Color: PLYEL / Appearance: CLEAR / S.011 / pH: 6.0  Gluc: NEGATIVE / Ketone: SMALL  / Bili: NEGATIVE / Urobili: NORMAL E.U.   Blood: NEGATIVE / Protein: NEGATIVE / Nitrite: NEGATIVE   Leuk Esterase: NEGATIVE / RBC: 0-2 / WBC 0-2   Sq Epi: x / Non Sq Epi: x / Bacteria: x      Imaging:   no new imaging

## 2017-08-27 NOTE — PROGRESS NOTE ADULT - SUBJECTIVE AND OBJECTIVE BOX
Patient is a 62y old  Male who presents with a chief complaint of ischemic colitis    SUBJECTIVE / OVERNIGHT EVENTS:    Patient feeling ok, reports L>R LLQ pain.  States has 2 BM last night and 1 today, has tenesmus.  States stool is yellow/brown mucous; minimal blood.  No nausea/vomiting.  Reports at outside hospital was NPO since last .  Ambulating w/o difficultly. Voiding.     MEDICATIONS  (STANDING):  sodium chloride 0.9%. 1000 milliLiter(s) (75 mL/Hr) IV Continuous <Continuous>  tamsulosin 0.4 milliGRAM(s) Oral at bedtime  piperacillin/tazobactam IVPB. 3.375 Gram(s) IV Intermittent every 8 hours  insulin lispro (HumaLOG) corrective regimen sliding scale   SubCutaneous three times a day before meals  insulin lispro (HumaLOG) corrective regimen sliding scale   SubCutaneous at bedtime  dextrose 5%. 1000 milliLiter(s) (50 mL/Hr) IV Continuous <Continuous>  dextrose 50% Injectable 12.5 Gram(s) IV Push once  dextrose 50% Injectable 25 Gram(s) IV Push once  dextrose 50% Injectable 25 Gram(s) IV Push once    MEDICATIONS  (PRN):  acetaminophen   Tablet 650 milliGRAM(s) Oral every 6 hours PRN For Temp greater than 38 C (100.4 F)  acetaminophen   Tablet. 650 milliGRAM(s) Oral every 6 hours PRN Mild Pain (1 - 3)  dextrose Gel 1 Dose(s) Oral once PRN Blood Glucose LESS THAN 70 milliGRAM(s)/deciliter  glucagon  Injectable 1 milliGRAM(s) IntraMuscular once PRN Glucose LESS THAN 70 milligrams/deciliter  morphine  - Injectable 2 milliGRAM(s) IV Push every 4 hours PRN Moderate Pain (4 - 6)  morphine  - Injectable 4 milliGRAM(s) IV Push every 4 hours PRN Severe Pain (7 - 10)      T(C): 36.8 (17 @ 10:19), Max: 37.2 (17 @ 21:16)  HR: 95 (17 @ 10:19) (92 - 105)  BP: 131/85 (17 @ 10:19) (129/80 - 137/86)  RR: 18 (17 @ 10:19) (17 - 18)  SpO2: 100% (17 @ 10:19) (99% - 100%)    CAPILLARY BLOOD GLUCOSE  140 (27 Aug 2017 11:57)  142 (27 Aug 2017 08:40)  142 (26 Aug 2017 21:16)    PHYSICAL EXAM:  GENERAL: NAD  HEAD:  Atraumatic, Normocephalic  EYES: EOMI, PERRLA, conjunctiva and sclera clear  NECK: Supple, No JVD  CHEST/LUNG: Clear to auscultation bilaterally; No wheeze  HEART: Regular rate and rhythm; No murmurs, rubs, or gallops  ABDOMEN: TTP in LQ, no r/g, + BS  EXTREMITIES:   warm and well perfused, No clubbing, cyanosis, or edema  PSYCH: AAOx3  NEUROLOGY: non-focal  SKIN: No rashes or lesions    LABS:                        12.7   12.39 )-----------( 347      ( 27 Aug 2017 06:13 )             37.7         138  |  98  |  6<L>  ----------------------------<  149<H>  4.2   |  25  |  0.87    Ca    8.9      27 Aug 2017 06:13  Phos  3.8       Mg     1.9         TPro  7.0  /  Alb  3.3  /  TBili  0.3  /  DBili  x   /  AST  19  /  ALT  25  /  AlkPhos  53        Urinalysis Basic - ( 26 Aug 2017 20:10 )    Color: PLYEL / Appearance: CLEAR / S.011 / pH: 6.0  Gluc: NEGATIVE / Ketone: SMALL  / Bili: NEGATIVE / Urobili: NORMAL E.U.   Blood: NEGATIVE / Protein: NEGATIVE / Nitrite: NEGATIVE   Leuk Esterase: NEGATIVE / RBC: 0-2 / WBC 0-2   Sq Epi: x / Non Sq Epi: x / Bacteria: x      Microbiology:   [facility]  @ 18:37  pH: 7.46/pCO2: 33/pO2: 76/HCO3: 25/lactate: --

## 2017-08-27 NOTE — PROGRESS NOTE ADULT - ASSESSMENT
62yom PMHx of DM2 (on metformin) presents as a transfer from [facility] for ischemic colitis.

## 2017-08-27 NOTE — CONSULT NOTE ADULT - ASSESSMENT
Impression:  1)Ischemic colitis- pt clinical presentation and reported pathology consistent with ischemic colitis, he is s/p course of antibiotics now with loose stools. Concerned for possible infectious diarrhea (C. diff) in setting of underlying ischemic colitis.  2)Renal lesion  2)DM2    Plan:  1)Would hold off on additional antibiotics for now, check C. diff in setting of recent antibiotics and hospitalization  2)IVF, Clear liquid diet as tolerated   3)f/u CTA a/p as ordered  4)Surgical evaluation  5)Serial abdominal exams, monitor bowel movements   Discussed plan with patient, wife and hospitalist Impression:  1)Ischemic colitis- pt clinical presentation and reported pathology consistent with ischemic colitis, he is s/p course of antibiotics now with loose stools. Concerned for possible infectious diarrhea (C. diff) in setting of underlying ischemic colitis.  2)Renal lesion  2)DM2    Plan:  1)Would hold off on additional antibiotics for now, check C. diff in setting of recent antibiotics and hospitalization  2)IVF, Clear liquid diet as tolerated   3)f/u CTA a/p as ordered  4)Surgical evaluation  5)Serial abdominal exams, monitor bowel movements   6)Please obtain records from [facility] (imaging, pathology and colonoscopy reports)   Discussed plan with patient, wife and hospitalist Impression:  1)Ischemic colitis- pt clinical presentation and reported pathology consistent with ischemic colitis, he is s/p course of antibiotics now with loose stools. Concerned for possible infectious diarrhea (C. diff) in setting of underlying ischemic colitis.  2)Renal lesion  2)DM2    Plan:  1)Would hold off on additional antibiotics for now, check C. diff and stool cx in setting of recent antibiotics and hospitalization  2)IVF, Diet as tolerated  3)f/u CTA a/p as ordered  4)Surgical evaluation  5)Serial abdominal exams, monitor bowel movements   6)Please obtain records from [facility] (imaging, pathology and colonoscopy reports)   Discussed plan with patient, wife and hospitalist

## 2017-08-27 NOTE — PROGRESS NOTE ADULT - PROBLEM SELECTOR PLAN 2
Likely reactive in the setting of ischemic colitis. No signs of infection. Per [facility] notes, patient has a persistent leukocytosis from 13-14  -will monitor CBC

## 2017-08-28 ENCOUNTER — TRANSCRIPTION ENCOUNTER (OUTPATIENT)
Age: 62
End: 2017-08-28

## 2017-08-28 LAB
APTT BLD: 28 SEC — SIGNIFICANT CHANGE UP (ref 27.5–37.4)
BUN SERPL-MCNC: 4 MG/DL — LOW (ref 7–23)
CALCIUM SERPL-MCNC: 8.9 MG/DL — SIGNIFICANT CHANGE UP (ref 8.4–10.5)
CHLORIDE SERPL-SCNC: 101 MMOL/L — SIGNIFICANT CHANGE UP (ref 98–107)
CO2 SERPL-SCNC: 23 MMOL/L — SIGNIFICANT CHANGE UP (ref 22–31)
CREAT SERPL-MCNC: 0.73 MG/DL — SIGNIFICANT CHANGE UP (ref 0.5–1.3)
GLUCOSE SERPL-MCNC: 138 MG/DL — HIGH (ref 70–99)
HCT VFR BLD CALC: 35.9 % — LOW (ref 39–50)
HGB BLD-MCNC: 12 G/DL — LOW (ref 13–17)
INR BLD: 1.48 — HIGH (ref 0.88–1.17)
MAGNESIUM SERPL-MCNC: 1.9 MG/DL — SIGNIFICANT CHANGE UP (ref 1.6–2.6)
MCHC RBC-ENTMCNC: 27.8 PG — SIGNIFICANT CHANGE UP (ref 27–34)
MCHC RBC-ENTMCNC: 33.4 % — SIGNIFICANT CHANGE UP (ref 32–36)
MCV RBC AUTO: 83.3 FL — SIGNIFICANT CHANGE UP (ref 80–100)
NRBC # FLD: 0 — SIGNIFICANT CHANGE UP
PHOSPHATE SERPL-MCNC: 2.7 MG/DL — SIGNIFICANT CHANGE UP (ref 2.5–4.5)
PLATELET # BLD AUTO: 379 K/UL — SIGNIFICANT CHANGE UP (ref 150–400)
PMV BLD: 8.8 FL — SIGNIFICANT CHANGE UP (ref 7–13)
POTASSIUM SERPL-MCNC: 3.7 MMOL/L — SIGNIFICANT CHANGE UP (ref 3.5–5.3)
POTASSIUM SERPL-SCNC: 3.7 MMOL/L — SIGNIFICANT CHANGE UP (ref 3.5–5.3)
PROTHROM AB SERPL-ACNC: 16.7 SEC — HIGH (ref 9.8–13.1)
RBC # BLD: 4.31 M/UL — SIGNIFICANT CHANGE UP (ref 4.2–5.8)
RBC # FLD: 14 % — SIGNIFICANT CHANGE UP (ref 10.3–14.5)
SODIUM SERPL-SCNC: 139 MMOL/L — SIGNIFICANT CHANGE UP (ref 135–145)
WBC # BLD: 10.25 K/UL — SIGNIFICANT CHANGE UP (ref 3.8–10.5)
WBC # FLD AUTO: 10.25 K/UL — SIGNIFICANT CHANGE UP (ref 3.8–10.5)

## 2017-08-28 PROCEDURE — 99222 1ST HOSP IP/OBS MODERATE 55: CPT | Mod: GC

## 2017-08-28 PROCEDURE — 99233 SBSQ HOSP IP/OBS HIGH 50: CPT

## 2017-08-28 PROCEDURE — 74177 CT ABD & PELVIS W/CONTRAST: CPT | Mod: 26

## 2017-08-28 RX ADMIN — SODIUM CHLORIDE 75 MILLILITER(S): 9 INJECTION INTRAMUSCULAR; INTRAVENOUS; SUBCUTANEOUS at 21:40

## 2017-08-28 RX ADMIN — Medication 650 MILLIGRAM(S): at 13:10

## 2017-08-28 RX ADMIN — SODIUM CHLORIDE 75 MILLILITER(S): 9 INJECTION INTRAMUSCULAR; INTRAVENOUS; SUBCUTANEOUS at 08:46

## 2017-08-28 RX ADMIN — TAMSULOSIN HYDROCHLORIDE 0.4 MILLIGRAM(S): 0.4 CAPSULE ORAL at 21:40

## 2017-08-28 RX ADMIN — Medication 650 MILLIGRAM(S): at 19:12

## 2017-08-28 RX ADMIN — Medication 650 MILLIGRAM(S): at 20:12

## 2017-08-28 RX ADMIN — Medication 650 MILLIGRAM(S): at 12:40

## 2017-08-28 NOTE — DISCHARGE NOTE ADULT - PLAN OF CARE
Your a1c is 6.7.  Please continue medications as directed and consistent carbohydrate diet control. resolution Surgical Input: Discussed with PMD: Dr. Ariana Kaur the suggestion of a microvascular workup in the setting of possible ischemic colitis.  Vascular referral as an outpatient is recommended. s/p Brody resection of colon, ostomy creation Patient has been instructed to follow up with outpatient Gastroenterology within 1 month of discharge [phone], Dr. Rollins (Urology) for evaluation of renal mass and BPH and Surgeon, Dr. Santoyo, for post-op check up. Patient is stable for discharge home.

## 2017-08-28 NOTE — DISCHARGE NOTE ADULT - HOSPITAL COURSE
62yom PMHx of DM2 (on Metformin) presents as a transfer from [facility] for ischemic colitis.   Pt arriving for second opinion.  CT A/P showing--------  GI consulted, no need for abx.  Pt is refusing surgery at this time.      dispo: to home 62yom PMHx of DM2 (on metformin) presents as a transfer from [facility] for ischemic colitis. Pt had a colonoscopy 7/24 for evaluation for evaluation of hemorrhoids and lower abdominal/rectal discomfort. The colonoscopy by Gastroenterologist Dr. Wolfe, showed sigmoid thickening and biopsies showed findings concerning for ischemic colitis. Pt was admitted to [facility] for further evaluation on 8/16. Per chart, CT A/P showed sigmoid colitis/proctitis, grossly patent mesenteric arterial vasculature. No acute mesenteric venous thrombosis seen.   8/17 Patient had a sigmoidoscopy by Dr. Leija which showed congested erythematous friable inflamed ulcerated mucosa and biopsy of that also showed ischemic colitis and erosion.   8/20 CT A/P showed worsening colitis-no fluid collection or abscess. Per chart, patient had no melena/bloody bowel movements since admission to [facility].  Patient did not want surgery while at [facility] and came to [facility] for a second opinion regarding non surgical options.  8/26 Admitted to the hospital and started on IV antibiotics: Cipro and Flagyl.  8/28 CTAP showed severe rectosigmoid colitis. The appearance is nonspecific and could be due to multiple etiologies.  Unremarkable mesenteric vasculature. Indeterminate 2 cm left renal mass for which dedicated renal MRI is recommended. Patient is recommended to follow up with Dr. Aguiar (urology) as outpatient. His course was complicated by urinary retention now started on flomax.   9/1 Biopsy done on 8/18 at [facility] showed colonic mucosa with ischemic colitis and erosion. The stains were negative for cytomegalovirus, and the findings are consistent with ischemic colitis per the report (in chart faxed over). Patient was febrile on 9/4, without improvement of ischemic colitis while on conservative therapy. Patient underwent a Brody resection of colon and ostomy on 9/8 for ischemic colitis.    Post-op course has been uncomplicated, ostomy is with stool and gas, functioning well. Patient is tolerating a regular diet and ambulating without difficulty. Patient has been instructed on proper ostomy care and management.     Patient has been instructed to follow up with outpatient Gastroenterology within 1 month of discharge [phone], Dr. Cole Rollins (Urology) for evaluation of renal mass and BPH and Surgeon for post-op check up. Patient is stable for discharge home.

## 2017-08-28 NOTE — PROGRESS NOTE ADULT - SUBJECTIVE AND OBJECTIVE BOX
CC: ischemic colitis (28 Aug 2017 11:59)    SUBJECTIVE / OVERNIGHT EVENTS:  Patient reports lower abdominal pressure, mucus in stool but of less quantity. Tolerating clear liquid diet w/o n/v.     MEDICATIONS  (STANDING):  sodium chloride 0.9%. 1000 milliLiter(s) (75 mL/Hr) IV Continuous <Continuous>  tamsulosin 0.4 milliGRAM(s) Oral at bedtime  insulin lispro (HumaLOG) corrective regimen sliding scale   SubCutaneous three times a day before meals  insulin lispro (HumaLOG) corrective regimen sliding scale   SubCutaneous at bedtime  dextrose 5%. 1000 milliLiter(s) (50 mL/Hr) IV Continuous <Continuous>  dextrose 50% Injectable 12.5 Gram(s) IV Push once  dextrose 50% Injectable 25 Gram(s) IV Push once  dextrose 50% Injectable 25 Gram(s) IV Push once    MEDICATIONS  (PRN):  acetaminophen   Tablet 650 milliGRAM(s) Oral every 6 hours PRN For Temp greater than 38 C (100.4 F)  acetaminophen   Tablet. 650 milliGRAM(s) Oral every 6 hours PRN Mild Pain (1 - 3)  dextrose Gel 1 Dose(s) Oral once PRN Blood Glucose LESS THAN 70 milliGRAM(s)/deciliter  glucagon  Injectable 1 milliGRAM(s) IntraMuscular once PRN Glucose LESS THAN 70 milligrams/deciliter  morphine  - Injectable 2 milliGRAM(s) IV Push every 4 hours PRN Moderate Pain (4 - 6)  morphine  - Injectable 4 milliGRAM(s) IV Push every 4 hours PRN Severe Pain (7 - 10)      Vital Signs Last 24 Hrs  T(C): 36.6 (28 Aug 2017 12:35), Max: 37 (27 Aug 2017 21:16)  T(F): 97.8 (28 Aug 2017 12:35), Max: 98.6 (27 Aug 2017 21:16)  HR: 95 (28 Aug 2017 12:35) (84 - 95)  BP: 151/92 (28 Aug 2017 12:35) (127/78 - 151/92)  BP(mean): --  RR: 17 (28 Aug 2017 12:35) (17 - 18)  SpO2: 100% (28 Aug 2017 12:35) (100% - 100%)  CAPILLARY BLOOD GLUCOSE  147 (28 Aug 2017 12:16)  126 (28 Aug 2017 08:30)  123 (27 Aug 2017 22:20)  192 (27 Aug 2017 17:09)      PHYSICAL EXAM  GENERAL: NAD, well-developed  EYES: EOMI, conjunctiva and sclera clear  CHEST/LUNG: Clear to auscultation bilaterally; No wheeze  HEART: Regular rate and rhythm; No murmurs, rubs, or gallops  ABDOMEN: Soft, mild discomfort in suprapubic region, Nondistended; Bowel sounds present  EXTREMITIES:  2+ Peripheral Pulses, No clubbing, cyanosis, or edema  PSYCH: AAOx3      LABS:                        12.0   10.25 )-----------( 379      ( 28 Aug 2017 05:50 )             35.9     08-28    139  |  101  |  4<L>  ----------------------------<  138<H>  3.7   |  23  |  0.73    Ca    8.9      28 Aug 2017 05:50  Phos  2.7     08-28  Mg     1.9     08-28    TPro  7.0  /  Alb  3.3  /  TBili  0.3  /  DBili  x   /  AST  19  /  ALT  25  /  AlkPhos  53  08-27    PT/INR - ( 28 Aug 2017 05:50 )   PT: 16.7 SEC;   INR: 1.48          PTT - ( 28 Aug 2017 05:50 )  PTT:28.0 SEC      RADIOLOGY & ADDITIONAL TESTS:    08/28/17:  CT ABDOMEN AND PELVIS with contrast  FINDINGS:  LOWER CHEST: Within normal limits.    LIVER: Within normal limits.  SPLEEN: Within normal limits.  PANCREAS: Within normal limits.  GALLBLADDER: Within normal limits.  BILE DUCTS: Normal caliber.  ADRENALS: Within normal limits.  KIDNEYS/URETERS: Small right-sided renal cysts and subcentimeter lesions   in both kidneys which are too small to characterize. 1.1 x 2.0 cm   posterior exophytic hyperdense left renal lesion which is indeterminate.   Several small bilateral nonobstructing renal stones.    RETROPERITONEUM: Scattered small retroperitoneal lymph nodes which are   most likely reactive.    VESSELS:  The celiac, superior mesenteric and inferior mesenteric   arteries are widely patent. The portal and superior mesenteric veins are patent.     BOWEL: No bowel obstruction. Appendix normal. Marked wall thickening of   the sigmoid colon and rectum with extensive pericolic inflammatory   change. No extraluminal gas or pneumatosis. No abscess.  PERITONEUM: No ascites.    REPRODUCTIVE ORGANS: Prostate and seminal vesicles are unremarkable.  BLADDER: Within normal limits.    ABDOMINAL WALL: Within normal limits.  BONES: No acute bony abnormality.    IMPRESSION: Severe rectosigmoid colitis. The appearance is nonspecific   and could be due to multiple etiologies.  Unremarkable mesenteric   vasculature. Indeterminate 2 cm left renal mass for which dedicated renal MRI is   recommended.    Imaging Personally Reviewed: Yes  Consultant(s) Notes Reviewed:  Yes  Care Discussed with Consultants/Other Providers: Yes

## 2017-08-28 NOTE — DISCHARGE NOTE ADULT - INSTRUCTIONS
Low salt, low fat, consistent carbohydrate diet as tolerated Low salt, low fat, consistent carbohydrate diet as tolerated. Report of any redness, drainage, swelling, fever or pain not relieved by pain medication. Report of any redness, drainage, swelling, fever or pain not relieved by pain medication.  Open and empty stomy as needed. Change colostomy appliance every 3-4 days. Initial supplies given.

## 2017-08-28 NOTE — DISCHARGE NOTE ADULT - CONDITIONS AT DISCHARGE
Pt stable. Tolerating cons. carb. diet. Lower mid. abdominal incision c/d/i with packing and gauze and tape. Ostomy appliance in place, pt able to open and close appliance independently however, teaching still required for changing appliance according to primary nurse; teaching reinforced again by primary nurse. Initial supplies given. Patient discharged to home. Discharge instructions given and understood. IV removed. Pt stable. Tolerating cons. carb. diet. Lower mid. abdominal incision c/d/i with packing and gauze and tape. Ostomy appliance in place, pt able to open and close appliance independently however, teaching still required for changing appliance according to primary nurse; teaching reinforced again by primary nurse. Home care set up. Initial supplies given. Patient discharged to home. Discharge instructions given and understood. IV removed. Pt stable. Tolerating cons. carb. diet. Lower mid. abdominal incision c/d/i and EH. Ostomy appliance in place, pt able to open and close appliance independently however, teaching still required for changing appliance according to primary nurse; teaching reinforced again by primary nurse. Home care set up. Initial supplies given. Patient discharged to home. Discharge instructions given and understood. IV removed.

## 2017-08-28 NOTE — DISCHARGE NOTE ADULT - MEDICATION SUMMARY - MEDICATIONS TO TAKE
I will START or STAY ON the medications listed below when I get home from the hospital:    aspirin 81 mg oral tablet  -- 1 tab(s) by mouth once a day  -- Indication: For Hear disease prevention    acetaminophen 325 mg oral tablet  -- 2 tab(s) by mouth every 6 hours  -- Indication: For mild pain    oxyCODONE 5 mg oral tablet  -- 1 tab(s) by mouth every 4-6 hours for moderate pain or 2 tabs by mouth every 4-6 hours for severe pain MDD:6  -- Indication: For moderate to severe pain    lisinopril 2.5 mg oral tablet  -- 1 tab(s) by mouth once a day   -- Indication: For Hypertension    tamsulosin 0.4 mg oral capsule  -- 1 cap(s) by mouth once a day (at bedtime)  -- Indication: For BPH    gabapentin 100 mg oral capsule  -- 1 cap(s) by mouth 3 times a day  -- Indication: For Diabetic neuropathy    metFORMIN 500 mg oral tablet  -- 1 tab(s) by mouth 2 times a day  -- Indication: For Diabetes mellitus type 2, noninsulin dependent

## 2017-08-28 NOTE — DISCHARGE NOTE ADULT - CARE PLAN
Principal Discharge DX:	Ischemic colitis  Secondary Diagnosis:	Diabetes mellitus type 2, noninsulin dependent Principal Discharge DX:	Ischemic colitis  Secondary Diagnosis:	Diabetes mellitus type 2, noninsulin dependent  Instructions for follow-up, activity and diet:	Your a1c is 6.7.  Please continue medications as directed and consistent carbohydrate diet control. Principal Discharge DX:	Ischemic colitis  Goal:	resolution  Secondary Diagnosis:	Diabetes mellitus type 2, noninsulin dependent  Instructions for follow-up, activity and diet:	Your a1c is 6.7.  Please continue medications as directed and consistent carbohydrate diet control. Principal Discharge DX:	Ischemic colitis  Goal:	resolution  Instructions for follow-up, activity and diet:	Surgical Input: Discussed with PMD: Dr. Ariana Kaur the suggestion of a microvascular workup in the setting of possible ischemic colitis.  Vascular referral as an outpatient is recommended.  Secondary Diagnosis:	Diabetes mellitus type 2, noninsulin dependent  Instructions for follow-up, activity and diet:	Your a1c is 6.7.  Please continue medications as directed and consistent carbohydrate diet control. Principal Discharge DX:	Ischemic colitis  Goal:	s/p Brody resection of colon, ostomy creation  Instructions for follow-up, activity and diet:	Patient has been instructed to follow up with outpatient Gastroenterology within 1 month of discharge [phone], Dr. Rollins (Urology) for evaluation of renal mass and BPH and Surgeon, Dr. Santoyo, for post-op check up. Patient is stable for discharge home.  Secondary Diagnosis:	Diabetes mellitus type 2, noninsulin dependent  Instructions for follow-up, activity and diet:	Your a1c is 6.7.  Please continue medications as directed and consistent carbohydrate diet control.

## 2017-08-28 NOTE — DISCHARGE NOTE ADULT - CARE PROVIDER_API CALL
Ariana Kaur), Internal Medicine  [address] [place] [address]  Phone: [phone]  Fax: [phone] Ariana Kaur), Internal Medicine  [address] [place] [address]  Phone: [phone]  Fax: [phone]    Stephon De Leon), ColonRectal Surgery; Surgery  [address] [facility]  [address]  [place] [address]  Phone: [phone]  Fax: [phone]    Cole Rollins), Urology  [address] [facility]  [address]nd [place] [address]  Phone: [phone]  Fax: [phone]

## 2017-08-28 NOTE — DISCHARGE NOTE ADULT - CARE PROVIDERS DIRECT ADDRESSES
,DirectAddress_Unknown ,DirectAddress_Unknown,[email or web address],DirectAddress_Unknown

## 2017-08-28 NOTE — PROGRESS NOTE ADULT - ASSESSMENT
62yom PMHx of DM2 (on metformin) presents as a transfer from [facility] for ischemic colitis for second opinion regarding non-surgical options available.

## 2017-08-29 ENCOUNTER — RESULT REVIEW (OUTPATIENT)
Age: 62
End: 2017-08-29

## 2017-08-29 PROCEDURE — 45331 SIGMOIDOSCOPY AND BIOPSY: CPT | Mod: GC

## 2017-08-29 PROCEDURE — 99232 SBSQ HOSP IP/OBS MODERATE 35: CPT | Mod: 25,GC

## 2017-08-29 PROCEDURE — 99233 SBSQ HOSP IP/OBS HIGH 50: CPT

## 2017-08-29 PROCEDURE — 88342 IMHCHEM/IMCYTCHM 1ST ANTB: CPT | Mod: 26

## 2017-08-29 PROCEDURE — 88305 TISSUE EXAM BY PATHOLOGIST: CPT | Mod: 26

## 2017-08-29 RX ORDER — INSULIN LISPRO 100/ML
1 VIAL (ML) SUBCUTANEOUS ONCE
Qty: 0 | Refills: 0 | Status: COMPLETED | OUTPATIENT
Start: 2017-08-29 | End: 2017-08-29

## 2017-08-29 RX ADMIN — TAMSULOSIN HYDROCHLORIDE 0.4 MILLIGRAM(S): 0.4 CAPSULE ORAL at 21:38

## 2017-08-29 RX ADMIN — Medication 1 UNIT(S): at 00:17

## 2017-08-29 RX ADMIN — MORPHINE SULFATE 2 MILLIGRAM(S): 50 CAPSULE, EXTENDED RELEASE ORAL at 11:05

## 2017-08-29 RX ADMIN — SODIUM CHLORIDE 75 MILLILITER(S): 9 INJECTION INTRAMUSCULAR; INTRAVENOUS; SUBCUTANEOUS at 10:50

## 2017-08-29 RX ADMIN — Medication 1 UNIT(S): at 20:04

## 2017-08-29 RX ADMIN — Medication 650 MILLIGRAM(S): at 22:38

## 2017-08-29 RX ADMIN — Medication 650 MILLIGRAM(S): at 21:38

## 2017-08-29 RX ADMIN — MORPHINE SULFATE 2 MILLIGRAM(S): 50 CAPSULE, EXTENDED RELEASE ORAL at 10:49

## 2017-08-29 NOTE — PROGRESS NOTE ADULT - SUBJECTIVE AND OBJECTIVE BOX
Chief Complaint:  Patient is a 62y old  Male who presents with a chief complaint of ischemic colitis (28 Aug 2017 11:59)      Interval Events: Patient still has some lower abdominal/rectal pain that is unchanged. No nausea/vomiting. While it is somewhat better, the pain still is significant.    Allergies:  No Known Allergies      Hospital Medications:  sodium chloride 0.9%. 1000 milliLiter(s) IV Continuous <Continuous>  acetaminophen   Tablet 650 milliGRAM(s) Oral every 6 hours PRN  acetaminophen   Tablet. 650 milliGRAM(s) Oral every 6 hours PRN  tamsulosin 0.4 milliGRAM(s) Oral at bedtime  dextrose 5%. 1000 milliLiter(s) IV Continuous <Continuous>  dextrose Gel 1 Dose(s) Oral once PRN  dextrose 50% Injectable 12.5 Gram(s) IV Push once  dextrose 50% Injectable 25 Gram(s) IV Push once  dextrose 50% Injectable 25 Gram(s) IV Push once  glucagon  Injectable 1 milliGRAM(s) IntraMuscular once PRN  morphine  - Injectable 2 milliGRAM(s) IV Push every 4 hours PRN      PMHX/PSHX:  Ischemic colitis  Diabetes mellitus type 2, noninsulin dependent  No significant past surgical history      Family history:  Family history of diabetes mellitus in brother (Sibling)      ROS:     General:  No wt loss, fevers, chills, night sweats, fatigue,   Eyes:  Good vision, no reported pain  ENT:  No sore throat, pain, runny nose, dysphagia  CV:  No pain, palpitations, hypo/hypertension  Resp:  No dyspnea, cough, tachypnea, wheezing  GI:  See HPI  :  No pain, bleeding, incontinence, nocturia  Skin:  No rash, edema      PHYSICAL EXAM:   Vital Signs:  Vital Signs Last 24 Hrs  T(C): 37.6 (29 Aug 2017 05:40), Max: 37.6 (29 Aug 2017 05:40)  T(F): 99.7 (29 Aug 2017 05:40), Max: 99.7 (29 Aug 2017 05:40)  HR: 79 (29 Aug 2017 10:47) (79 - 95)  BP: 156/94 (29 Aug 2017 10:47) (140/85 - 156/94)  BP(mean): --  RR: 20 (29 Aug 2017 10:47) (17 - 20)  SpO2: 100% (29 Aug 2017 05:40) (100% - 100%)  Daily     Daily     GENERAL:  Appears stated age, well-groomed, well-nourished, no distress  HEENT:  NC/AT,  conjunctivae clear and pink, no thyromegaly, nodules, adenopathy, no JVD, sclera -anicteric  CHEST:  Full & symmetric excursion, no increased effort, breath sounds clear  HEART:  Regular rhythm, S1, S2, no murmur/rub/S3/S4, no abdominal bruit, no edema  ABDOMEN:  Soft, mild RLQ and LLQ tenderness, non-distended, normoactive bowel sounds,  no masses ,no hepato-splenomegaly, no signs of chronic liver disease  EXTEREMITIES:  no cyanosis,clubbing or edema  SKIN:  No rash/erythema/ecchymoses/petechiae/wounds/abscess/warm/dry  NEURO:  Alert, oriented, no asterixis, no tremor, no encephalopathy    LABS:                        12.0   10.25 )-----------( 379      ( 28 Aug 2017 05:50 )             35.9     08-28    139  |  101  |  4<L>  ----------------------------<  138<H>  3.7   |  23  |  0.73    Ca    8.9      28 Aug 2017 05:50  Phos  2.7     08-28  Mg     1.9     08-28        PT/INR - ( 28 Aug 2017 05:50 )   PT: 16.7 SEC;   INR: 1.48          PTT - ( 28 Aug 2017 05:50 )  PTT:28.0 SEC

## 2017-08-29 NOTE — PROGRESS NOTE ADULT - ATTENDING COMMENTS
Pt seen and examined. Agree with fellow's note. Plan discussed with patient and primary team and wife. Radiology reviewed with Lane Ortega. [facility] records reviewed.      Patient had chief complaint of colitis.  The differential diagnosis is ischemic, inflammatory, or infectious.    Discussed case with patient and his wife. Have preliminary medical records from [facility] that indicated that patient had ischemic colitis on August sigmoidoscopy. I do not have those official pathology reports nor do I have colonoscopy reports from July/August. The duration and location of his colitis is unusual for ischemic colitis. I would recommend pursuing sigmoidoscopy today for biopsies. I would conduct an infectious work up as well.. I would check stool culture, ova and parasites times three, and C dif. I would also check and ESR and CRP, QFN gold, and HBV serologies. Pt also needs colorectal surgery consult.     25 min face/face, >50% in care coordination discussing differential diagnosis, next steps in management. Pt seen and examined. Agree with fellow's note. Plan discussed with patient and primary team and wife. Radiology reviewed with Lane Ortega. [facility] records reviewed.      Patient had chief complaint of colitis.  The differential diagnosis is ischemic, inflammatory, or infectious.    Discussed case with patient and his wife. Have preliminary medical records from [facility] that indicated that patient had ischemic colitis on August sigmoidoscopy. I do not have those official pathology reports nor do I have colonoscopy reports from July/August. The duration and location of his colitis is unusual for ischemic colitis. He stated that he had been on an oral mesalamine for 5 weeks without improvement in symptoms.  I would recommend pursuing sigmoidoscopy today for biopsies. I would conduct an infectious work up as well.. I would check stool culture, ova and parasites times three, and C dif. I would also check and ESR and CRP, QFN gold, and HBV serologies. Pt also needs colorectal surgery consult.     25 min face/face, >50% in care coordination discussing differential diagnosis, next steps in management.

## 2017-08-29 NOTE — PROGRESS NOTE ADULT - ASSESSMENT
62yom PMHx of DM2 (on metformin) presents as a transfer from [facility] for ischemic colitis for second opinion regarding non-surgical options available.

## 2017-08-29 NOTE — PROGRESS NOTE ADULT - ASSESSMENT
Impression:  1) Rectosigmoid colitis - differential includes IBD (UC vs Crohns' given rectal involvement), infectious coltiis, ischemic colitis also in differential  2)Renal lesion  2)DM2    Plan:  -  2)IVF, Diet as tolerated  3)f/u CTA a/p as ordered  4)Surgical evaluation  5)Serial abdominal exams, monitor bowel movements   6)Please obtain records from [facility] (imaging, pathology and colonoscopy reports)   Discussed plan with patient, wife and hospitalist Impression:  1) Rectosigmoid colitis - differential includes IBD (UC vs Crohns' given rectal involvement), infectious coltiis, ischemic colitis also in differential  2)Renal lesion  2)DM2    Plan:  -would still check C diff, stool cultures, and O&P  -Repeat CT reviewed - given that the inflammation involves both the rectum and sigmoid (atypical for ischemia given dual blood supply of rectum) - will plan for flex sig today unsedated to re-evaluate endoscopically the inflammation  -serial abdominal exams, monitor bowel movements   -Obtain pathology from [facility] if possible   -Discussed plan with patient, primary team Impression:  1) Rectosigmoid colitis - differential includes IBD (UC vs Crohns' given rectal involvement), infectious colitis, ischemic colitis  2)Renal lesion  2)DM2    Plan:  -would still check C diff, stool cultures, and O&P  -Repeat CT reviewed - given that the inflammation involves both the rectum and sigmoid (atypical for ischemia given dual blood supply of rectum) - will plan for flex sig today unsedated to re-evaluate endoscopically the inflammation  -serial abdominal exams, monitor bowel movements   -Obtain colonoscopy and  pathology from [facility] if possible   -Discussed plan with patient, primary team

## 2017-08-29 NOTE — PROGRESS NOTE ADULT - SUBJECTIVE AND OBJECTIVE BOX
CC: ischemic colitis (28 Aug 2017 11:59)    SUBJECTIVE / OVERNIGHT EVENTS:  Patient tolerating diet but still with abdominal pain. No n/v.    MEDICATIONS  (STANDING):  sodium chloride 0.9%. 1000 milliLiter(s) (75 mL/Hr) IV Continuous <Continuous>  tamsulosin 0.4 milliGRAM(s) Oral at bedtime  dextrose 5%. 1000 milliLiter(s) (50 mL/Hr) IV Continuous <Continuous>  dextrose 50% Injectable 12.5 Gram(s) IV Push once  dextrose 50% Injectable 25 Gram(s) IV Push once  dextrose 50% Injectable 25 Gram(s) IV Push once    MEDICATIONS  (PRN):  acetaminophen   Tablet 650 milliGRAM(s) Oral every 6 hours PRN For Temp greater than 38 C (100.4 F)  acetaminophen   Tablet. 650 milliGRAM(s) Oral every 6 hours PRN Mild Pain (1 - 3)  dextrose Gel 1 Dose(s) Oral once PRN Blood Glucose LESS THAN 70 milliGRAM(s)/deciliter  glucagon  Injectable 1 milliGRAM(s) IntraMuscular once PRN Glucose LESS THAN 70 milligrams/deciliter  morphine  - Injectable 2 milliGRAM(s) IV Push every 4 hours PRN Moderate Pain (4 - 6)      Vital Signs Last 24 Hrs  T(C): 36.8 (29 Aug 2017 14:33), Max: 37.6 (29 Aug 2017 05:40)  T(F): 98.3 (29 Aug 2017 14:33), Max: 99.7 (29 Aug 2017 05:40)  HR: 95 (29 Aug 2017 14:33) (79 - 95)  BP: 155/96 (29 Aug 2017 14:33) (140/85 - 156/94)  BP(mean): --  RR: 17 (29 Aug 2017 14:33) (17 - 20)  SpO2: 99% (29 Aug 2017 14:33) (99% - 100%)  CAPILLARY BLOOD GLUCOSE  196 (29 Aug 2017 08:46)  237 (29 Aug 2017 00:00)  230 (28 Aug 2017 21:53)  215 (28 Aug 2017 19:33)        LABS:                        12.0   10.25 )-----------( 379      ( 28 Aug 2017 05:50 )             35.9     08-28    139  |  101  |  4<L>  ----------------------------<  138<H>  3.7   |  23  |  0.73    Ca    8.9      28 Aug 2017 05:50  Phos  2.7     08-28  Mg     1.9     08-28      PT/INR - ( 28 Aug 2017 05:50 )   PT: 16.7 SEC;   INR: 1.48          PTT - ( 28 Aug 2017 05:50 )  PTT:28.0 SEC          RADIOLOGY & ADDITIONAL TESTS:    Imaging Personally Reviewed:  Consultant(s) Notes Reviewed:    Care Discussed with Consultants/Other Providers:

## 2017-08-30 DIAGNOSIS — R21 RASH AND OTHER NONSPECIFIC SKIN ERUPTION: ICD-10-CM

## 2017-08-30 DIAGNOSIS — E87.6 HYPOKALEMIA: ICD-10-CM

## 2017-08-30 DIAGNOSIS — E83.39 OTHER DISORDERS OF PHOSPHORUS METABOLISM: ICD-10-CM

## 2017-08-30 DIAGNOSIS — N28.89 OTHER SPECIFIED DISORDERS OF KIDNEY AND URETER: ICD-10-CM

## 2017-08-30 LAB
ALBUMIN SERPL ELPH-MCNC: 2.8 G/DL — LOW (ref 3.3–5)
ALP SERPL-CCNC: 42 U/L — SIGNIFICANT CHANGE UP (ref 40–120)
ALT FLD-CCNC: 18 U/L — SIGNIFICANT CHANGE UP (ref 4–41)
AST SERPL-CCNC: 15 U/L — SIGNIFICANT CHANGE UP (ref 4–40)
BASOPHILS # BLD AUTO: 0.03 K/UL — SIGNIFICANT CHANGE UP (ref 0–0.2)
BASOPHILS NFR BLD AUTO: 0.2 % — SIGNIFICANT CHANGE UP (ref 0–2)
BILIRUB SERPL-MCNC: 0.2 MG/DL — SIGNIFICANT CHANGE UP (ref 0.2–1.2)
BUN SERPL-MCNC: 4 MG/DL — LOW (ref 7–23)
CALCIUM SERPL-MCNC: 7.6 MG/DL — LOW (ref 8.4–10.5)
CHLORIDE SERPL-SCNC: 105 MMOL/L — SIGNIFICANT CHANGE UP (ref 98–107)
CO2 SERPL-SCNC: 22 MMOL/L — SIGNIFICANT CHANGE UP (ref 22–31)
CREAT SERPL-MCNC: 0.59 MG/DL — SIGNIFICANT CHANGE UP (ref 0.5–1.3)
CRP SERPL-MCNC: 83 MG/L — SIGNIFICANT CHANGE UP
EOSINOPHIL # BLD AUTO: 0.08 K/UL — SIGNIFICANT CHANGE UP (ref 0–0.5)
EOSINOPHIL NFR BLD AUTO: 0.6 % — SIGNIFICANT CHANGE UP (ref 0–6)
ERYTHROCYTE [SEDIMENTATION RATE] IN BLOOD: 83 MM/HR — HIGH (ref 1–15)
GLUCOSE SERPL-MCNC: 144 MG/DL — HIGH (ref 70–99)
HAV IGM SER-ACNC: NONREACTIVE — SIGNIFICANT CHANGE UP
HBV CORE AB SER-ACNC: NONREACTIVE — SIGNIFICANT CHANGE UP
HBV CORE IGM SER-ACNC: NONREACTIVE — SIGNIFICANT CHANGE UP
HBV SURFACE AB SER-ACNC: <3 MLU/ML — LOW
HBV SURFACE AG SER-ACNC: NEGATIVE — SIGNIFICANT CHANGE UP
HCT VFR BLD CALC: 32.3 % — LOW (ref 39–50)
HCV AB S/CO SERPL IA: 0.14 S/CO — SIGNIFICANT CHANGE UP
HCV AB SERPL-IMP: SIGNIFICANT CHANGE UP
HGB BLD-MCNC: 11.1 G/DL — LOW (ref 13–17)
IMM GRANULOCYTES # BLD AUTO: 0.07 # — SIGNIFICANT CHANGE UP
IMM GRANULOCYTES NFR BLD AUTO: 0.5 % — SIGNIFICANT CHANGE UP (ref 0–1.5)
LYMPHOCYTES # BLD AUTO: 0.92 K/UL — LOW (ref 1–3.3)
LYMPHOCYTES # BLD AUTO: 6.3 % — LOW (ref 13–44)
MAGNESIUM SERPL-MCNC: 1.7 MG/DL — SIGNIFICANT CHANGE UP (ref 1.6–2.6)
MCHC RBC-ENTMCNC: 28.8 PG — SIGNIFICANT CHANGE UP (ref 27–34)
MCHC RBC-ENTMCNC: 34.4 % — SIGNIFICANT CHANGE UP (ref 32–36)
MCV RBC AUTO: 83.9 FL — SIGNIFICANT CHANGE UP (ref 80–100)
MONOCYTES # BLD AUTO: 1.03 K/UL — HIGH (ref 0–0.9)
MONOCYTES NFR BLD AUTO: 7.1 % — SIGNIFICANT CHANGE UP (ref 2–14)
NEUTROPHILS # BLD AUTO: 12.4 K/UL — HIGH (ref 1.8–7.4)
NEUTROPHILS NFR BLD AUTO: 85.3 % — HIGH (ref 43–77)
NRBC # FLD: 0 — SIGNIFICANT CHANGE UP
PHOSPHATE SERPL-MCNC: 2.2 MG/DL — LOW (ref 2.5–4.5)
PLATELET # BLD AUTO: 331 K/UL — SIGNIFICANT CHANGE UP (ref 150–400)
PMV BLD: 8.9 FL — SIGNIFICANT CHANGE UP (ref 7–13)
POTASSIUM SERPL-MCNC: 3.2 MMOL/L — LOW (ref 3.5–5.3)
POTASSIUM SERPL-SCNC: 3.2 MMOL/L — LOW (ref 3.5–5.3)
PROT SERPL-MCNC: 6.1 G/DL — SIGNIFICANT CHANGE UP (ref 6–8.3)
RBC # BLD: 3.85 M/UL — LOW (ref 4.2–5.8)
RBC # FLD: 14 % — SIGNIFICANT CHANGE UP (ref 10.3–14.5)
SODIUM SERPL-SCNC: 140 MMOL/L — SIGNIFICANT CHANGE UP (ref 135–145)
WBC # BLD: 14.53 K/UL — HIGH (ref 3.8–10.5)
WBC # FLD AUTO: 14.53 K/UL — HIGH (ref 3.8–10.5)

## 2017-08-30 PROCEDURE — 99233 SBSQ HOSP IP/OBS HIGH 50: CPT | Mod: GC

## 2017-08-30 PROCEDURE — 99233 SBSQ HOSP IP/OBS HIGH 50: CPT

## 2017-08-30 PROCEDURE — 99223 1ST HOSP IP/OBS HIGH 75: CPT

## 2017-08-30 RX ORDER — SIMETHICONE 80 MG/1
80 TABLET, CHEWABLE ORAL ONCE
Qty: 0 | Refills: 0 | Status: COMPLETED | OUTPATIENT
Start: 2017-08-30 | End: 2017-08-30

## 2017-08-30 RX ORDER — POTASSIUM CHLORIDE 20 MEQ
40 PACKET (EA) ORAL ONCE
Qty: 0 | Refills: 0 | Status: COMPLETED | OUTPATIENT
Start: 2017-08-30 | End: 2017-08-30

## 2017-08-30 RX ORDER — INSULIN LISPRO 100/ML
1 VIAL (ML) SUBCUTANEOUS ONCE
Qty: 0 | Refills: 0 | Status: COMPLETED | OUTPATIENT
Start: 2017-08-30 | End: 2017-08-30

## 2017-08-30 RX ORDER — SODIUM,POTASSIUM PHOSPHATES 278-250MG
1 POWDER IN PACKET (EA) ORAL
Qty: 0 | Refills: 0 | Status: COMPLETED | OUTPATIENT
Start: 2017-08-30 | End: 2017-08-31

## 2017-08-30 RX ADMIN — SODIUM CHLORIDE 75 MILLILITER(S): 9 INJECTION INTRAMUSCULAR; INTRAVENOUS; SUBCUTANEOUS at 03:47

## 2017-08-30 RX ADMIN — SIMETHICONE 80 MILLIGRAM(S): 80 TABLET, CHEWABLE ORAL at 18:22

## 2017-08-30 RX ADMIN — Medication 1 TABLET(S): at 17:10

## 2017-08-30 RX ADMIN — TAMSULOSIN HYDROCHLORIDE 0.4 MILLIGRAM(S): 0.4 CAPSULE ORAL at 21:28

## 2017-08-30 RX ADMIN — Medication 1 UNIT(S): at 21:46

## 2017-08-30 RX ADMIN — Medication 1 TABLET(S): at 21:28

## 2017-08-30 RX ADMIN — Medication 1 TABLET(S): at 12:25

## 2017-08-30 RX ADMIN — Medication 40 MILLIEQUIVALENT(S): at 09:12

## 2017-08-30 RX ADMIN — SODIUM CHLORIDE 75 MILLILITER(S): 9 INJECTION INTRAMUSCULAR; INTRAVENOUS; SUBCUTANEOUS at 12:25

## 2017-08-30 NOTE — CONSULT NOTE ADULT - SUBJECTIVE AND OBJECTIVE BOX
62 year old man complains of difficulty with bowel movements for the last 3 months. He describes pencil-thin stools that started 3 months ago. Then he began having fecal incontinence when he exercises and intermittent episodes of loose stools and diarrhea. He describes that he soils his diapers with stool and mucus. The mucus occasionally contains a little blood but he does not currently describe melena or frankly bloody stools. He has had a lot of pressure as well in his lower abdomen, mostly in his left lower quadrant however when he strains to pass a bowel movement he often feels as though he cannot and it worsens his abdominal pain. He underwent a flexible sigmoidoscopy yesterday and he says that afterwards he felt much better and he was able to sleep comfortably for the first time in a long time without the pressure and abdominal pain. He is currently passing flatus.    He is currently also complaining of urinary frequency which he attributes to his blood sugars being too high. At home, he says his blood sugars are around 125.    HPI:  62yom PMHx of DM2 (on metformin) presents as a transfer from [facility] for ischemic colitis.         Pt had a colonoscopy  for evaluation for evaluation of hemorrhoids and lower abdominal/rectal discomfort. The colonoscopy ( by Gastroenterologist Dr. Wolfe)  showed sigmoid thickening and biopsies showed findings concerning for ischemic colitis. Pt was admitted to [facility] for further evaluation on . Per chart, CT A/P showed sigmoid colitis/proctitis, grossly patent mesenteric arterial vasculature. No acute mesenteric venous thrombosis seen. On : patient had a sigmoidoscopy by Dr. Leija which showed congested erythematous friable inflamed ulcerated mucosa and biopsy of that also showed ischemic colitis and erosion.  CT A/P showed worsening colitis-no fluid collection or abscess. Per chart, patient had no melena/bloody bowel movements since admission to [facility].      Pt describes his symptoms as a heavy discomfort in LLQ radiating to the lower abdominal area. He has intermittent BRBPR on diapers and toilet paper but no nausea vomiting, melena. He started having diarrhea last week while at [facility]-per notes cdiff was negative. He is having 1-2 watery brown BM a day with intermittent BRBPR. He also has been having mucous in his bowel movements. These symptoms have been going on for 3 months total.   Of note, CTA/P also showed left renal cyst vs mass and patient was recommended to follow up with Dr. Aguiar (urology) as outpatient. His course was complicated by urinary retention now started on flomax. (26 Aug 2017 21:19)      PMH  Diabetes mellitus type 2, noninsulin dependent    PSH  No significant past surgical history    Family History:  Several of his brothers have diabetes, as did his mother. His father  of cardiac disease. No one in his family has been diagnosed with or has had notable symptoms of gastrointestinal disease    MEDS  MEDICATIONS  (STANDING):  sodium chloride 0.9%. 1000 milliLiter(s) (75 mL/Hr) IV Continuous <Continuous>  tamsulosin 0.4 milliGRAM(s) Oral at bedtime  dextrose 5%. 1000 milliLiter(s) (50 mL/Hr) IV Continuous <Continuous>  dextrose 50% Injectable 12.5 Gram(s) IV Push once  dextrose 50% Injectable 25 Gram(s) IV Push once  dextrose 50% Injectable 25 Gram(s) IV Push once  potassium acid phosphate/sodium acid phosphate tablet (K-PHOS No. 2) 1 Tablet(s) Oral four times a day with meals  sodium biphosphate Rectal Enema 1 Enema Rectal once    MEDICATIONS  (PRN):  acetaminophen   Tablet 650 milliGRAM(s) Oral every 6 hours PRN For Temp greater than 38 C (100.4 F)  acetaminophen   Tablet. 650 milliGRAM(s) Oral every 6 hours PRN Mild Pain (1 - 3)  dextrose Gel 1 Dose(s) Oral once PRN Blood Glucose LESS THAN 70 milliGRAM(s)/deciliter  glucagon  Injectable 1 milliGRAM(s) IntraMuscular once PRN Glucose LESS THAN 70 milligrams/deciliter  morphine  - Injectable 2 milliGRAM(s) IV Push every 4 hours PRN Moderate Pain (4 - 6)      Allergies    No Known Allergies      Social- Patient lives with his wife at home. He does not smoke cigarettes or drink alcohol. 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        Physical Exam  T(C): 36.6 (17 @ 21:29), Max: 36.9 (17 @ 05:19)  HR: 100 (17 @ 21:29) (99 - 100)  BP: 147/83 (17 @ 21:29) (129/85 - 147/83)  RR: 17 (17 @ 21:29) (17 - 18)  SpO2: 100% (17 @ 21:29) (99% - 100%)  Wt(kg): --  Tmax: T(C): , Max: 36.9 (17 @ 05:19)  Wt(kg): --    Gen: NAD  HEENT: normocephalic, atraumatic, no scleral icterus  CV: S1, S2, RRR  Pulm: CTA B/L  Abd: Soft, distended, tenderness to palpation mostly localized to LLQ, diffuse erythematous rash on skin  Rectal: mucus seen in diaper and around anus, no red blood seen, sphincter with normal tone, no stool in rectal vault, no blood  Ext: warm, no edema, palp dp/pt      Labs:                        11.1   14.53 )-----------( 331      ( 30 Aug 2017 06:01 )             32.3         140  |  105  |  4<L>  ----------------------------<  144<H>  3.2<L>   |  22  |  0.59    Ca    7.6<L>      30 Aug 2017 06:01  Phos  2.2       Mg     1.7         TPro  6.1  /  Alb  2.8<L>  /  TBili  0.2  /  DBili  x   /  AST  15  /  ALT  18  /  AlkPhos  42      Findings of Flexible Sigmoidoscopy      The perianal and digital rectal examinations were normal.       - There was relative rectal sparing of inflammation. At 10cm of        insertion and proximal, there was severe, consistent, exudative and        ulcerated friable mucosa with loss of folds. Cold forceps biopsies were        taken of the sigmoid colon (Container 1) and rectum (Container 2).        Retroflexion was not performed.    CT abdomen and Pelvis   Severe rectosigmoid colitis. The appearance is nonspecific and  could be due to multiple etiologies. Unremarkable mesenteric vasculature.    Indeterminate 2 cm left renal mass for which dedicated renal MRI is  recommended.    Pathology Results     Final Diagnosis  1. Colon, sigmoid, biopsy:  - Fibrinopurulent material, see note.  - No colonic mucosa identified.    2. Rectum, biopsy:  - Colonic mucosa with focal erosion and granulation tissue.  - Negative for granulomata or viral cytopathic effects.  - CMV immunohistochemical stain to be reported as addendum.    Note:  Part 1 - Fibrinopurulent material could be from the  pseudomembrane or secondary to acute ischemia. Colonic  mucosa is not identified. Endoscopic and microbiologic  correlation is suggested to exclude infection.

## 2017-08-30 NOTE — PROGRESS NOTE ADULT - ASSESSMENT
62yom PMHx of DM2 (on metformin) presents as a transfer from [facility] for ischemic colitis for second opinion regarding non-surgical options available.

## 2017-08-30 NOTE — PROGRESS NOTE ADULT - ASSESSMENT
Impression:  1. Sigmoid colitis - differential includes ischemic colitis, IBD (Crohn's vs UC)    Recommend:  -Please send stool culture, ova and parasites, and C diff as ordered  -Follow-up hepatitis B serologies as well as quantiferon gold (sent this morning)  -Regular low residual diet  -Follow-up biopsy specimens  -Will need biopsy specimens to see if patient has signs of IBD vs ischemic colitis - further therapy will depend on biopsies  -Please obtain biopsy results from OSH colonoscopy Impression:  1. RectoSigmoid colitis - differential includes ischemic colitis, IBD (Crohn's vs UC), infectious. ESR/CRP 83.     Recommend:  -Please send stool culture, ova and parasites, and C diff as ordered  -Follow-up hepatitis B serologies as well as quantiferon gold (sent this morning)  -Regular low residual diet  -Follow-up biopsy specimens. Have requested them stat.   -Will need biopsy specimens to see if patient has signs of IBD vs ischemic colitis - further therapy will depend on biopsies  -Please obtain biopsy results from OSH colonoscopy  -Diet as tolerated.  -Colorectal surgery consult  -Patient should see us in 1 month as outpatient. [phone]

## 2017-08-30 NOTE — PROGRESS NOTE ADULT - SUBJECTIVE AND OBJECTIVE BOX
Chief Complaint:  Patient is a 62y old  Male who presents with a chief complaint of ischemic colitis (28 Aug 2017 11:59)      Interval Events: Patient reports complete resolution of pain today. Last night was the first night in a month he was able to sleep. He denies any abdominal pain, nausea/vomiting. He tolerated a regular diet today. He has not had a BM since the flex sig yesterday.    Allergies:  No Known Allergies      Hospital Medications:  sodium chloride 0.9%. 1000 milliLiter(s) IV Continuous <Continuous>  acetaminophen   Tablet 650 milliGRAM(s) Oral every 6 hours PRN  acetaminophen   Tablet. 650 milliGRAM(s) Oral every 6 hours PRN  tamsulosin 0.4 milliGRAM(s) Oral at bedtime  dextrose 5%. 1000 milliLiter(s) IV Continuous <Continuous>  dextrose Gel 1 Dose(s) Oral once PRN  dextrose 50% Injectable 12.5 Gram(s) IV Push once  dextrose 50% Injectable 25 Gram(s) IV Push once  dextrose 50% Injectable 25 Gram(s) IV Push once  glucagon  Injectable 1 milliGRAM(s) IntraMuscular once PRN  morphine  - Injectable 2 milliGRAM(s) IV Push every 4 hours PRN  potassium acid phosphate/sodium acid phosphate tablet (K-PHOS No. 2) 1 Tablet(s) Oral four times a day with meals      PMHX/PSHX:  Ischemic colitis  Diabetes mellitus type 2, noninsulin dependent  No significant past surgical history      Family history:  Family history of diabetes mellitus in brother (Sibling)      ROS:     General:  No wt loss, fevers, chills, night sweats, fatigue,   Eyes:  Good vision, no reported pain  ENT:  No sore throat, pain, runny nose, dysphagia  CV:  No pain, palpitations, hypo/hypertension  Resp:  No dyspnea, cough, tachypnea, wheezing  GI:  See HPI  :  No pain, bleeding, incontinence, nocturia  Heme:  No petechiae, ecchymosis, easy bruisability  Skin:  No rash, edema      PHYSICAL EXAM:   Vital Signs:  Vital Signs Last 24 Hrs  T(C): 36.9 (30 Aug 2017 05:19), Max: 37.3 (29 Aug 2017 21:32)  T(F): 98.4 (30 Aug 2017 05:19), Max: 99.2 (29 Aug 2017 21:32)  HR: 99 (30 Aug 2017 05:19) (79 - 100)  BP: 143/90 (30 Aug 2017 05:19) (143/90 - 159/97)  BP(mean): --  RR: 18 (30 Aug 2017 05:19) (17 - 20)  SpO2: 99% (30 Aug 2017 05:19) (99% - 100%)  Daily Height in cm: 165.1 (29 Aug 2017 14:08)    Daily     GENERAL:  Appears stated age, well-groomed, well-nourished, no distress  HEENT:  NC/AT,  conjunctivae clear, sclera -anicteric  CHEST:  Full & symmetric excursion, no increased effort, breath sounds clear  HEART:  Regular rhythm, S1, S2, no murmur/rub/S3/S4,  no edema  ABDOMEN:  Soft, non-tender, mildly distended  EXTREMITIES:  no cyanosis,clubbing or edema  SKIN:  No rash/erythema/ecchymoses  NEURO:  Alert, oriented x 3    LABS:                        11.1   14.53 )-----------( 331      ( 30 Aug 2017 06:01 )             32.3     08-30    140  |  105  |  4<L>  ----------------------------<  144<H>  3.2<L>   |  22  |  0.59    Ca    7.6<L>      30 Aug 2017 06:01  Phos  2.2     08-30  Mg     1.7     08-30    TPro  6.1  /  Alb  2.8<L>  /  TBili  0.2  /  DBili  x   /  AST  15  /  ALT  18  /  AlkPhos  42  08-30    LIVER FUNCTIONS - ( 30 Aug 2017 06:01 )  Alb: 2.8 g/dL / Pro: 6.1 g/dL / ALK PHOS: 42 u/L / ALT: 18 u/L / AST: 15 u/L / GGT: x                   Imaging:    < from: Flexible Sigmoidoscopy (08.29.17 @ 16:00) >    [facility]  _______________________________________________________________________________  Patient Name: Bryanna Muro          Procedure Date: 8/29/2017 4:00 PM  MRN: [id number]                     Account Number: [id number]  YOB: 1955               Admit Type: Inpatient  Room: 2                               Gender: Male  Attending MD: RUTH GUSTAFSON MD          _______________________________________________________________________________     Procedure:           Flexible Sigmoidoscopy  Indications:         Suspected persistent colitis, Rectosigmoid colitis                        (differential diagnosis IBD, infectious, ischemic).  Providers:           RUTH GUSTAFSON MD, ANAYA LIU MD (Fellow)  Medicines:           None  Complications:       No immediate complications.  Procedure:           After obtaining informed consent, the endoscope was                        passed under direct vision. Throughout the procedure,   the patient's blood pressure, pulse, and oxygen                        saturations were monitored continuously. The was                        introduced through the anus and advanced to the sigmoid                        colon. The flexible sigmoidoscopy was accomplished                        without difficulty. The patient tolerated the procedure                        well. The quality of the bowel preparation was good.          Findings:       The perianal and digital rectal examinations were normal.       - There was relative rectal sparing of inflammation. At 10cm of        insertion and proximal, there was severe, consistent, exudative and        ulcerated friable mucosa with loss of folds. Cold forceps biopsies were        taken of the sigmoid colon (Container 1) and rectum (Container 2).        Retroflexion was not performed.                                                                                   Impression:          - No specimens collected.  Recommendation:      - Await pathology results.                       - Check QFN, ESR, CRP, HBV serologies, Cdif, Stool cx,                        O&P x 3.                       - Please obtain OSH colonoscopy report and biopsies.                       - The findings and recommendations were discussed with                        the patient. Patient was given copy of this report.                       - The findings and recommendations were discussed with                        the patient's family.                       - The findings and recommendations were discussed with                        the patient's primary physician.                 Attending Participation:       I was present and participated during the entire procedure, including        non-key portions. I was present for the key portions of this procedure        and immediately available for all non-key portions of the procedure.                                                                                     _______________  RUTH GUSTAFSON MD  8/29/2017 4:53:16 PM  This report has been signed electronically.  Number of Addenda: 0    Note Initiated On: 8/29/2017 4:00 PM    < end of copied text >

## 2017-08-30 NOTE — CONSULT NOTE ADULT - ASSESSMENT
62 year old man with complaints of thin stools, leaking of stool, mucus and small amounts of blood and severe abdominal pressure and pain. CT scan shows severe rectosigmoid colitis and flexible sigmoidoscopy shows severe consistent exudative, friable mucosa with loss of folds. Etiology (ischemic, infectious or inflammatory) currently unclear.    Plan:    - f/u infectious workup including stool ova, parasites, culture and C. Diff  - suspicion low for ischemic colitis given that the patient doesn't have a cardiac, arrhythmia or other history consistent with low-flow states  however may consider a TTE to evaluate for thrombus  -   -will d/w Dr. De Leno 62 year old man with complaints of thin stools, leaking of stool, mucus and small amounts of blood and severe abdominal pressure and pain. CT scan shows severe rectosigmoid colitis and flexible sigmoidoscopy shows severe consistent exudative, friable mucosa with loss of folds. Etiology (ischemic, infectious or inflammatory) currently unclear.    Plan:    - f/u infectious workup including stool ova, parasites, culture and C. Diff  - recommend clear liquid diet, and NPO if patient shows any worsening symptoms  - Please start cipro and flagyl  - patient to be seen by Dr. De Leon  - d/w Dr. De Leon    -JEF Otero MD, PGY-2   #[id number]

## 2017-08-30 NOTE — PROGRESS NOTE ADULT - SUBJECTIVE AND OBJECTIVE BOX
Patient is a 62y old  Male who presents with a chief complaint of ischemic colitis (28 Aug 2017 11:59)      SUBJECTIVE / OVERNIGHT EVENTS:No overnight events, abdominal pain is much better today, states that he slept well after 2 weeks, no bowel movement yet after the flex sig yesterday, no other complaints    MEDICATIONS  (STANDING):  sodium chloride 0.9%. 1000 milliLiter(s) (75 mL/Hr) IV Continuous <Continuous>  tamsulosin 0.4 milliGRAM(s) Oral at bedtime  dextrose 5%. 1000 milliLiter(s) (50 mL/Hr) IV Continuous <Continuous>  dextrose 50% Injectable 12.5 Gram(s) IV Push once  dextrose 50% Injectable 25 Gram(s) IV Push once  dextrose 50% Injectable 25 Gram(s) IV Push once  potassium acid phosphate/sodium acid phosphate tablet (K-PHOS No. 2) 1 Tablet(s) Oral four times a day with meals    MEDICATIONS  (PRN):  acetaminophen   Tablet 650 milliGRAM(s) Oral every 6 hours PRN For Temp greater than 38 C (100.4 F)  acetaminophen   Tablet. 650 milliGRAM(s) Oral every 6 hours PRN Mild Pain (1 - 3)  dextrose Gel 1 Dose(s) Oral once PRN Blood Glucose LESS THAN 70 milliGRAM(s)/deciliter  glucagon  Injectable 1 milliGRAM(s) IntraMuscular once PRN Glucose LESS THAN 70 milligrams/deciliter  morphine  - Injectable 2 milliGRAM(s) IV Push every 4 hours PRN Moderate Pain (4 - 6)      Vital Signs Last 24 Hrs  T(C): 36.8 (30 Aug 2017 14:29), Max: 37.3 (29 Aug 2017 21:32)  T(F): 98.2 (30 Aug 2017 14:29), Max: 99.2 (29 Aug 2017 21:32)  HR: 100 (30 Aug 2017 14:29) (99 - 100)  BP: 129/85 (30 Aug 2017 14:29) (129/85 - 159/97)  BP(mean): --  RR: 18 (30 Aug 2017 14:29) (18 - 18)  SpO2: 100% (30 Aug 2017 14:29) (99% - 100%)  CAPILLARY BLOOD GLUCOSE  147 (30 Aug 2017 08:30)  232 (29 Aug 2017 19:17)        I&O's Summary      PHYSICAL EXAM:  GENERAL: NAD, well-developed  CHEST/LUNG: Clear to auscultation bilaterally; No wheeze  HEART: Regular rate and rhythm  ABDOMEN: Soft, minimal tenderness LLQ, mildly distended, has erythematous macular rash on the chest and abdomen  EXTREMITIES:  No LE edema  PSYCH: calm  NEUROLOGY: alert, awake responsive  SKIN: erythematous macular rash on the chest and abdomen which pt has not even noticed until now ( not pruritic)    LABS:                        11.1   14.53 )-----------( 331      ( 30 Aug 2017 06:01 )             32.3     08-30    140  |  105  |  4<L>  ----------------------------<  144<H>  3.2<L>   |  22  |  0.59    Ca    7.6<L>      30 Aug 2017 06:01  Phos  2.2     08-30  Mg     1.7     08-30    TPro  6.1  /  Alb  2.8<L>  /  TBili  0.2  /  DBili  x   /  AST  15  /  ALT  18  /  AlkPhos  42  08-30              RADIOLOGY & ADDITIONAL TESTS:    Imaging Personally Reviewed:    Consultant(s) Notes Reviewed:      Care Discussed with Consultants/Other Providers:

## 2017-08-30 NOTE — PROGRESS NOTE ADULT - PROBLEM SELECTOR PLAN 8
Enoxaparin for DVT ppx suspect due to BPH  -will follow up with urology as outpatient per [facility] notes  -continue with flomax

## 2017-08-30 NOTE — PROGRESS NOTE ADULT - ATTENDING COMMENTS
Pt seen and examined. Agree with fellow's note. Plan discussed with patient and primary team.     Patient had chief complaint of colitis.     25 min spent with patient, >50% time in coordination/counseling for dicussion of colitis, potential need for surgery, and expediting pathology results.

## 2017-08-30 NOTE — PROGRESS NOTE ADULT - PROBLEM SELECTOR PLAN 7
suspect due to BPH  -will follow up with urology as outpatient per [facility] notes  -continue with flomax HbA1c <7, BG at goal  -continue with sliding scale and check FS daily

## 2017-08-31 DIAGNOSIS — E11.40 TYPE 2 DIABETES MELLITUS WITH DIABETIC NEUROPATHY, UNSPECIFIED: ICD-10-CM

## 2017-08-31 LAB
ALBUMIN SERPL ELPH-MCNC: 3.1 G/DL — LOW (ref 3.3–5)
ALP SERPL-CCNC: 52 U/L — SIGNIFICANT CHANGE UP (ref 40–120)
ALT FLD-CCNC: 22 U/L — SIGNIFICANT CHANGE UP (ref 4–41)
AST SERPL-CCNC: 21 U/L — SIGNIFICANT CHANGE UP (ref 4–40)
BASOPHILS # BLD AUTO: 0.02 K/UL — SIGNIFICANT CHANGE UP (ref 0–0.2)
BASOPHILS NFR BLD AUTO: 0.1 % — SIGNIFICANT CHANGE UP (ref 0–2)
BILIRUB SERPL-MCNC: 0.3 MG/DL — SIGNIFICANT CHANGE UP (ref 0.2–1.2)
BUN SERPL-MCNC: 4 MG/DL — LOW (ref 7–23)
C DIFF TOX GENS STL QL NAA+PROBE: SIGNIFICANT CHANGE UP
CALCIUM SERPL-MCNC: 8.1 MG/DL — LOW (ref 8.4–10.5)
CHLORIDE SERPL-SCNC: 94 MMOL/L — LOW (ref 98–107)
CO2 SERPL-SCNC: 19 MMOL/L — LOW (ref 22–31)
CREAT SERPL-MCNC: 0.62 MG/DL — SIGNIFICANT CHANGE UP (ref 0.5–1.3)
EOSINOPHIL # BLD AUTO: 0.05 K/UL — SIGNIFICANT CHANGE UP (ref 0–0.5)
EOSINOPHIL NFR BLD AUTO: 0.3 % — SIGNIFICANT CHANGE UP (ref 0–6)
GLUCOSE SERPL-MCNC: 235 MG/DL — HIGH (ref 70–99)
HCT VFR BLD CALC: 32.9 % — LOW (ref 39–50)
HGB BLD-MCNC: 11.4 G/DL — LOW (ref 13–17)
IMM GRANULOCYTES # BLD AUTO: 0.13 # — SIGNIFICANT CHANGE UP
IMM GRANULOCYTES NFR BLD AUTO: 0.7 % — SIGNIFICANT CHANGE UP (ref 0–1.5)
LYMPHOCYTES # BLD AUTO: 0.94 K/UL — LOW (ref 1–3.3)
LYMPHOCYTES # BLD AUTO: 4.8 % — LOW (ref 13–44)
MAGNESIUM SERPL-MCNC: 1.7 MG/DL — SIGNIFICANT CHANGE UP (ref 1.6–2.6)
MCHC RBC-ENTMCNC: 28.6 PG — SIGNIFICANT CHANGE UP (ref 27–34)
MCHC RBC-ENTMCNC: 34.7 % — SIGNIFICANT CHANGE UP (ref 32–36)
MCV RBC AUTO: 82.5 FL — SIGNIFICANT CHANGE UP (ref 80–100)
MONOCYTES # BLD AUTO: 1.38 K/UL — HIGH (ref 0–0.9)
MONOCYTES NFR BLD AUTO: 7 % — SIGNIFICANT CHANGE UP (ref 2–14)
NEUTROPHILS # BLD AUTO: 17.11 K/UL — HIGH (ref 1.8–7.4)
NEUTROPHILS NFR BLD AUTO: 87.1 % — HIGH (ref 43–77)
NRBC # FLD: 0 — SIGNIFICANT CHANGE UP
O+P SPEC CONC: SIGNIFICANT CHANGE UP
PHOSPHATE SERPL-MCNC: 1.8 MG/DL — LOW (ref 2.5–4.5)
PLATELET # BLD AUTO: 362 K/UL — SIGNIFICANT CHANGE UP (ref 150–400)
PMV BLD: 8.8 FL — SIGNIFICANT CHANGE UP (ref 7–13)
POTASSIUM SERPL-MCNC: 3.2 MMOL/L — LOW (ref 3.5–5.3)
POTASSIUM SERPL-SCNC: 3.2 MMOL/L — LOW (ref 3.5–5.3)
PROT SERPL-MCNC: 6.8 G/DL — SIGNIFICANT CHANGE UP (ref 6–8.3)
RBC # BLD: 3.99 M/UL — LOW (ref 4.2–5.8)
RBC # FLD: 14 % — SIGNIFICANT CHANGE UP (ref 10.3–14.5)
SODIUM SERPL-SCNC: 131 MMOL/L — LOW (ref 135–145)
SPECIMEN SOURCE: SIGNIFICANT CHANGE UP
TRI STN SPEC: SIGNIFICANT CHANGE UP
WBC # BLD: 19.63 K/UL — HIGH (ref 3.8–10.5)
WBC # FLD AUTO: 19.63 K/UL — HIGH (ref 3.8–10.5)

## 2017-08-31 PROCEDURE — 99233 SBSQ HOSP IP/OBS HIGH 50: CPT

## 2017-08-31 RX ORDER — INSULIN LISPRO 100/ML
VIAL (ML) SUBCUTANEOUS AT BEDTIME
Qty: 0 | Refills: 0 | Status: DISCONTINUED | OUTPATIENT
Start: 2017-08-31 | End: 2017-09-14

## 2017-08-31 RX ORDER — METRONIDAZOLE 500 MG
500 TABLET ORAL EVERY 8 HOURS
Qty: 0 | Refills: 0 | Status: DISCONTINUED | OUTPATIENT
Start: 2017-08-31 | End: 2017-09-01

## 2017-08-31 RX ORDER — INSULIN LISPRO 100/ML
VIAL (ML) SUBCUTANEOUS
Qty: 0 | Refills: 0 | Status: DISCONTINUED | OUTPATIENT
Start: 2017-08-31 | End: 2017-09-14

## 2017-08-31 RX ORDER — MORPHINE SULFATE 50 MG/1
2 CAPSULE, EXTENDED RELEASE ORAL EVERY 4 HOURS
Qty: 0 | Refills: 0 | Status: DISCONTINUED | OUTPATIENT
Start: 2017-08-31 | End: 2017-09-07

## 2017-08-31 RX ORDER — METRONIDAZOLE 500 MG
500 TABLET ORAL ONCE
Qty: 0 | Refills: 0 | Status: COMPLETED | OUTPATIENT
Start: 2017-08-31 | End: 2017-08-31

## 2017-08-31 RX ORDER — GABAPENTIN 400 MG/1
100 CAPSULE ORAL THREE TIMES A DAY
Qty: 0 | Refills: 0 | Status: DISCONTINUED | OUTPATIENT
Start: 2017-08-31 | End: 2017-09-14

## 2017-08-31 RX ORDER — CIPROFLOXACIN LACTATE 400MG/40ML
400 VIAL (ML) INTRAVENOUS EVERY 12 HOURS
Qty: 0 | Refills: 0 | Status: DISCONTINUED | OUTPATIENT
Start: 2017-08-31 | End: 2017-09-01

## 2017-08-31 RX ORDER — METRONIDAZOLE 500 MG
TABLET ORAL
Qty: 0 | Refills: 0 | Status: DISCONTINUED | OUTPATIENT
Start: 2017-08-31 | End: 2017-09-01

## 2017-08-31 RX ORDER — CIPROFLOXACIN LACTATE 400MG/40ML
VIAL (ML) INTRAVENOUS
Qty: 0 | Refills: 0 | Status: DISCONTINUED | OUTPATIENT
Start: 2017-08-31 | End: 2017-09-01

## 2017-08-31 RX ORDER — CIPROFLOXACIN LACTATE 400MG/40ML
400 VIAL (ML) INTRAVENOUS ONCE
Qty: 0 | Refills: 0 | Status: COMPLETED | OUTPATIENT
Start: 2017-08-31 | End: 2017-08-31

## 2017-08-31 RX ORDER — SODIUM,POTASSIUM PHOSPHATES 278-250MG
1 POWDER IN PACKET (EA) ORAL
Qty: 0 | Refills: 0 | Status: COMPLETED | OUTPATIENT
Start: 2017-08-31 | End: 2017-09-01

## 2017-08-31 RX ORDER — LISINOPRIL 2.5 MG/1
2.5 TABLET ORAL DAILY
Qty: 0 | Refills: 0 | Status: DISCONTINUED | OUTPATIENT
Start: 2017-08-31 | End: 2017-09-08

## 2017-08-31 RX ORDER — POTASSIUM CHLORIDE 20 MEQ
40 PACKET (EA) ORAL ONCE
Qty: 0 | Refills: 0 | Status: COMPLETED | OUTPATIENT
Start: 2017-08-31 | End: 2017-08-31

## 2017-08-31 RX ORDER — POTASSIUM PHOSPHATE, MONOBASIC POTASSIUM PHOSPHATE, DIBASIC 236; 224 MG/ML; MG/ML
15 INJECTION, SOLUTION INTRAVENOUS ONCE
Qty: 0 | Refills: 0 | Status: COMPLETED | OUTPATIENT
Start: 2017-08-31 | End: 2017-08-31

## 2017-08-31 RX ORDER — ENOXAPARIN SODIUM 100 MG/ML
40 INJECTION SUBCUTANEOUS DAILY
Qty: 0 | Refills: 0 | Status: DISCONTINUED | OUTPATIENT
Start: 2017-08-31 | End: 2017-09-14

## 2017-08-31 RX ADMIN — Medication 200 MILLIGRAM(S): at 11:57

## 2017-08-31 RX ADMIN — Medication 100 MILLIGRAM(S): at 11:57

## 2017-08-31 RX ADMIN — GABAPENTIN 100 MILLIGRAM(S): 400 CAPSULE ORAL at 21:33

## 2017-08-31 RX ADMIN — GABAPENTIN 100 MILLIGRAM(S): 400 CAPSULE ORAL at 13:52

## 2017-08-31 RX ADMIN — Medication 1 TABLET(S): at 17:00

## 2017-08-31 RX ADMIN — Medication 100 MILLIGRAM(S): at 21:32

## 2017-08-31 RX ADMIN — Medication 1: at 17:15

## 2017-08-31 RX ADMIN — POTASSIUM PHOSPHATE, MONOBASIC POTASSIUM PHOSPHATE, DIBASIC 62.5 MILLIMOLE(S): 236; 224 INJECTION, SOLUTION INTRAVENOUS at 17:15

## 2017-08-31 RX ADMIN — Medication 1 TABLET(S): at 09:15

## 2017-08-31 RX ADMIN — Medication 1 TABLET(S): at 21:33

## 2017-08-31 RX ADMIN — TAMSULOSIN HYDROCHLORIDE 0.4 MILLIGRAM(S): 0.4 CAPSULE ORAL at 21:33

## 2017-08-31 RX ADMIN — Medication 40 MILLIEQUIVALENT(S): at 11:56

## 2017-08-31 RX ADMIN — Medication 1: at 12:06

## 2017-08-31 RX ADMIN — Medication 1 TABLET(S): at 11:56

## 2017-08-31 RX ADMIN — SODIUM CHLORIDE 75 MILLILITER(S): 9 INJECTION INTRAMUSCULAR; INTRAVENOUS; SUBCUTANEOUS at 01:47

## 2017-08-31 RX ADMIN — Medication 200 MILLIGRAM(S): at 23:26

## 2017-08-31 RX ADMIN — SODIUM CHLORIDE 75 MILLILITER(S): 9 INJECTION INTRAMUSCULAR; INTRAVENOUS; SUBCUTANEOUS at 15:27

## 2017-08-31 NOTE — PROGRESS NOTE ADULT - ASSESSMENT
62yom PMHx of DM2 (on metformin) presents as a transfer from [facility] for ischemic colitis for second opinion regarding non-surgical options available.

## 2017-08-31 NOTE — PROGRESS NOTE ADULT - SUBJECTIVE AND OBJECTIVE BOX
Patient is a 62y old  Male who presents with a chief complaint of ischemic colitis (28 Aug 2017 11:59)      SUBJECTIVE / OVERNIGHT EVENTS:No overnight events, pt reported that he had abdominal pain, distension and felt flatulence, had small bm and passed flatus after which he felt somewhat better, today still with some pain but less in severity compared to last night, c/l b/l feet tingling, no other complaints.    MEDICATIONS  (STANDING):  sodium chloride 0.9%. 1000 milliLiter(s) (75 mL/Hr) IV Continuous <Continuous>  tamsulosin 0.4 milliGRAM(s) Oral at bedtime  dextrose 5%. 1000 milliLiter(s) (50 mL/Hr) IV Continuous <Continuous>  dextrose 50% Injectable 12.5 Gram(s) IV Push once  dextrose 50% Injectable 25 Gram(s) IV Push once  dextrose 50% Injectable 25 Gram(s) IV Push once  sodium biphosphate Rectal Enema 1 Enema Rectal once  potassium chloride    Tablet ER 40 milliEquivalent(s) Oral once  potassium acid phosphate/sodium acid phosphate tablet (K-PHOS No. 2) 1 Tablet(s) Oral four times a day with meals  insulin lispro (HumaLOG) corrective regimen sliding scale   SubCutaneous three times a day before meals  insulin lispro (HumaLOG) corrective regimen sliding scale   SubCutaneous at bedtime    MEDICATIONS  (PRN):  acetaminophen   Tablet 650 milliGRAM(s) Oral every 6 hours PRN For Temp greater than 38 C (100.4 F)  acetaminophen   Tablet. 650 milliGRAM(s) Oral every 6 hours PRN Mild Pain (1 - 3)  dextrose Gel 1 Dose(s) Oral once PRN Blood Glucose LESS THAN 70 milliGRAM(s)/deciliter  glucagon  Injectable 1 milliGRAM(s) IntraMuscular once PRN Glucose LESS THAN 70 milligrams/deciliter  morphine  - Injectable 2 milliGRAM(s) IV Push every 4 hours PRN Moderate Pain (4 - 6)      Vital Signs Last 24 Hrs  T(C): 36.4 (31 Aug 2017 04:56), Max: 36.8 (30 Aug 2017 14:29)  T(F): 97.6 (31 Aug 2017 04:56), Max: 98.2 (30 Aug 2017 14:29)  HR: 105 (31 Aug 2017 04:56) (100 - 105)  BP: 155/95 (31 Aug 2017 04:56) (129/85 - 155/95)  BP(mean): --  RR: 18 (31 Aug 2017 04:56) (17 - 18)  SpO2: 99% (31 Aug 2017 04:56) (99% - 100%)  CAPILLARY BLOOD GLUCOSE  206 (31 Aug 2017 08:43)  273 (30 Aug 2017 21:38)        I&O's Summary    30 Aug 2017 07:01  -  31 Aug 2017 07:00  --------------------------------------------------------  IN: 0 mL / OUT: 1060 mL / NET: -1060 mL        PHYSICAL EXAM:  GENERAL: NAD, well-developed  CHEST/LUNG: Clear to auscultation bilaterally; No wheeze  HEART: Regular rate and rhythm; No murmurs  ABDOMEN: Soft, minimal tenderness lower abdomen, non distended  EXTREMITIES:  No LE edema  PSYCH: calm  NEUROLOGY: AAOx3   SKIN: macular erythematous rash improved    LABS:                        11.4   19.63 )-----------( 362      ( 31 Aug 2017 05:20 )             32.9     08-31    131<L>  |  94<L>  |  4<L>  ----------------------------<  235<H>  3.2<L>   |  19<L>  |  0.62    Ca    8.1<L>      31 Aug 2017 05:20  Phos  1.8     08-31  Mg     1.7     08-31    TPro  6.8  /  Alb  3.1<L>  /  TBili  0.3  /  DBili  x   /  AST  21  /  ALT  22  /  AlkPhos  52  08-31              RADIOLOGY & ADDITIONAL TESTS:    Imaging Personally Reviewed:    Consultant(s) Notes Reviewed:      Care Discussed with Consultants/Other Providers: Patient is a 62y old  Male who presents with a chief complaint of ischemic colitis (28 Aug 2017 11:59)      SUBJECTIVE / OVERNIGHT EVENTS:No overnight events, pt reported that he had abdominal pain, distension and felt flatulence, had small bm and passed flatus after which he felt somewhat better last night, today still with some pain but less in severity compared to last night, c/l b/l feet tingling, no other complaints.    MEDICATIONS  (STANDING):  sodium chloride 0.9%. 1000 milliLiter(s) (75 mL/Hr) IV Continuous <Continuous>  tamsulosin 0.4 milliGRAM(s) Oral at bedtime  dextrose 5%. 1000 milliLiter(s) (50 mL/Hr) IV Continuous <Continuous>  dextrose 50% Injectable 12.5 Gram(s) IV Push once  dextrose 50% Injectable 25 Gram(s) IV Push once  dextrose 50% Injectable 25 Gram(s) IV Push once  sodium biphosphate Rectal Enema 1 Enema Rectal once  potassium chloride    Tablet ER 40 milliEquivalent(s) Oral once  potassium acid phosphate/sodium acid phosphate tablet (K-PHOS No. 2) 1 Tablet(s) Oral four times a day with meals  insulin lispro (HumaLOG) corrective regimen sliding scale   SubCutaneous three times a day before meals  insulin lispro (HumaLOG) corrective regimen sliding scale   SubCutaneous at bedtime    MEDICATIONS  (PRN):  acetaminophen   Tablet 650 milliGRAM(s) Oral every 6 hours PRN For Temp greater than 38 C (100.4 F)  acetaminophen   Tablet. 650 milliGRAM(s) Oral every 6 hours PRN Mild Pain (1 - 3)  dextrose Gel 1 Dose(s) Oral once PRN Blood Glucose LESS THAN 70 milliGRAM(s)/deciliter  glucagon  Injectable 1 milliGRAM(s) IntraMuscular once PRN Glucose LESS THAN 70 milligrams/deciliter  morphine  - Injectable 2 milliGRAM(s) IV Push every 4 hours PRN Moderate Pain (4 - 6)      Vital Signs Last 24 Hrs  T(C): 36.4 (31 Aug 2017 04:56), Max: 36.8 (30 Aug 2017 14:29)  T(F): 97.6 (31 Aug 2017 04:56), Max: 98.2 (30 Aug 2017 14:29)  HR: 105 (31 Aug 2017 04:56) (100 - 105)  BP: 155/95 (31 Aug 2017 04:56) (129/85 - 155/95)  BP(mean): --  RR: 18 (31 Aug 2017 04:56) (17 - 18)  SpO2: 99% (31 Aug 2017 04:56) (99% - 100%)  CAPILLARY BLOOD GLUCOSE  206 (31 Aug 2017 08:43)  273 (30 Aug 2017 21:38)        I&O's Summary    30 Aug 2017 07:01  -  31 Aug 2017 07:00  --------------------------------------------------------  IN: 0 mL / OUT: 1060 mL / NET: -1060 mL        PHYSICAL EXAM:  GENERAL: NAD, well-developed  CHEST/LUNG: Clear to auscultation bilaterally; No wheeze  HEART: Regular rate and rhythm; No murmurs  ABDOMEN: Soft, minimal tenderness lower abdomen, non distended  EXTREMITIES:  No LE edema  PSYCH: calm  NEUROLOGY: AAOx3   SKIN: macular erythematous rash improved    LABS:                        11.4   19.63 )-----------( 362      ( 31 Aug 2017 05:20 )             32.9     08-31    131<L>  |  94<L>  |  4<L>  ----------------------------<  235<H>  3.2<L>   |  19<L>  |  0.62    Ca    8.1<L>      31 Aug 2017 05:20  Phos  1.8     08-31  Mg     1.7     08-31    TPro  6.8  /  Alb  3.1<L>  /  TBili  0.3  /  DBili  x   /  AST  21  /  ALT  22  /  AlkPhos  52  08-31              RADIOLOGY & ADDITIONAL TESTS:    Imaging Personally Reviewed:    Consultant(s) Notes Reviewed:      Care Discussed with Consultants/Other Providers:

## 2017-09-01 LAB
ALBUMIN SERPL ELPH-MCNC: 2.7 G/DL — LOW (ref 3.3–5)
ALP SERPL-CCNC: 49 U/L — SIGNIFICANT CHANGE UP (ref 40–120)
ALT FLD-CCNC: 20 U/L — SIGNIFICANT CHANGE UP (ref 4–41)
AST SERPL-CCNC: 16 U/L — SIGNIFICANT CHANGE UP (ref 4–40)
BASOPHILS # BLD AUTO: 0.02 K/UL — SIGNIFICANT CHANGE UP (ref 0–0.2)
BASOPHILS NFR BLD AUTO: 0.2 % — SIGNIFICANT CHANGE UP (ref 0–2)
BILIRUB SERPL-MCNC: 0.3 MG/DL — SIGNIFICANT CHANGE UP (ref 0.2–1.2)
BUN SERPL-MCNC: 4 MG/DL — LOW (ref 7–23)
BUN SERPL-MCNC: 4 MG/DL — LOW (ref 7–23)
CALCIUM SERPL-MCNC: 8.2 MG/DL — LOW (ref 8.4–10.5)
CALCIUM SERPL-MCNC: 8.2 MG/DL — LOW (ref 8.4–10.5)
CHLORIDE SERPL-SCNC: 101 MMOL/L — SIGNIFICANT CHANGE UP (ref 98–107)
CHLORIDE SERPL-SCNC: 101 MMOL/L — SIGNIFICANT CHANGE UP (ref 98–107)
CO2 SERPL-SCNC: 21 MMOL/L — LOW (ref 22–31)
CO2 SERPL-SCNC: 21 MMOL/L — LOW (ref 22–31)
CREAT SERPL-MCNC: 0.6 MG/DL — SIGNIFICANT CHANGE UP (ref 0.5–1.3)
CREAT SERPL-MCNC: 0.6 MG/DL — SIGNIFICANT CHANGE UP (ref 0.5–1.3)
EOSINOPHIL # BLD AUTO: 0.17 K/UL — SIGNIFICANT CHANGE UP (ref 0–0.5)
EOSINOPHIL NFR BLD AUTO: 1.4 % — SIGNIFICANT CHANGE UP (ref 0–6)
GLUCOSE SERPL-MCNC: 151 MG/DL — HIGH (ref 70–99)
GLUCOSE SERPL-MCNC: 151 MG/DL — HIGH (ref 70–99)
HCT VFR BLD CALC: 31.2 % — LOW (ref 39–50)
HCT VFR BLD CALC: 31.2 % — LOW (ref 39–50)
HGB BLD-MCNC: 10.6 G/DL — LOW (ref 13–17)
HGB BLD-MCNC: 10.6 G/DL — LOW (ref 13–17)
IMM GRANULOCYTES # BLD AUTO: 0.1 # — SIGNIFICANT CHANGE UP
IMM GRANULOCYTES NFR BLD AUTO: 0.8 % — SIGNIFICANT CHANGE UP (ref 0–1.5)
LYMPHOCYTES # BLD AUTO: 1.15 K/UL — SIGNIFICANT CHANGE UP (ref 1–3.3)
LYMPHOCYTES # BLD AUTO: 9.5 % — LOW (ref 13–44)
M TB TUBERC IFN-G BLD QL: 0 IU/ML — SIGNIFICANT CHANGE UP
M TB TUBERC IFN-G BLD QL: 0.02 IU/ML — SIGNIFICANT CHANGE UP
M TB TUBERC IFN-G BLD QL: SIGNIFICANT CHANGE UP
MAGNESIUM SERPL-MCNC: 1.8 MG/DL — SIGNIFICANT CHANGE UP (ref 1.6–2.6)
MAGNESIUM SERPL-MCNC: 1.8 MG/DL — SIGNIFICANT CHANGE UP (ref 1.6–2.6)
MCHC RBC-ENTMCNC: 28 PG — SIGNIFICANT CHANGE UP (ref 27–34)
MCHC RBC-ENTMCNC: 28 PG — SIGNIFICANT CHANGE UP (ref 27–34)
MCHC RBC-ENTMCNC: 34 % — SIGNIFICANT CHANGE UP (ref 32–36)
MCHC RBC-ENTMCNC: 34 % — SIGNIFICANT CHANGE UP (ref 32–36)
MCV RBC AUTO: 82.3 FL — SIGNIFICANT CHANGE UP (ref 80–100)
MCV RBC AUTO: 82.3 FL — SIGNIFICANT CHANGE UP (ref 80–100)
MITOGEN IGNF BCKGRD COR BLD-ACNC: 0.14 IU/ML — SIGNIFICANT CHANGE UP
MONOCYTES # BLD AUTO: 1.02 K/UL — HIGH (ref 0–0.9)
MONOCYTES NFR BLD AUTO: 8.5 % — SIGNIFICANT CHANGE UP (ref 2–14)
NEUTROPHILS # BLD AUTO: 9.61 K/UL — HIGH (ref 1.8–7.4)
NEUTROPHILS NFR BLD AUTO: 79.6 % — HIGH (ref 43–77)
NRBC # FLD: 0 — SIGNIFICANT CHANGE UP
NRBC # FLD: 0 — SIGNIFICANT CHANGE UP
PHOSPHATE SERPL-MCNC: 3 MG/DL — SIGNIFICANT CHANGE UP (ref 2.5–4.5)
PHOSPHATE SERPL-MCNC: 3 MG/DL — SIGNIFICANT CHANGE UP (ref 2.5–4.5)
PLATELET # BLD AUTO: 389 K/UL — SIGNIFICANT CHANGE UP (ref 150–400)
PLATELET # BLD AUTO: 389 K/UL — SIGNIFICANT CHANGE UP (ref 150–400)
PMV BLD: 8.6 FL — SIGNIFICANT CHANGE UP (ref 7–13)
PMV BLD: 8.6 FL — SIGNIFICANT CHANGE UP (ref 7–13)
POTASSIUM SERPL-MCNC: 3.4 MMOL/L — LOW (ref 3.5–5.3)
POTASSIUM SERPL-MCNC: 3.4 MMOL/L — LOW (ref 3.5–5.3)
POTASSIUM SERPL-SCNC: 3.4 MMOL/L — LOW (ref 3.5–5.3)
POTASSIUM SERPL-SCNC: 3.4 MMOL/L — LOW (ref 3.5–5.3)
PROT SERPL-MCNC: 6.4 G/DL — SIGNIFICANT CHANGE UP (ref 6–8.3)
RBC # BLD: 3.79 M/UL — LOW (ref 4.2–5.8)
RBC # BLD: 3.79 M/UL — LOW (ref 4.2–5.8)
RBC # FLD: 14.2 % — SIGNIFICANT CHANGE UP (ref 10.3–14.5)
RBC # FLD: 14.2 % — SIGNIFICANT CHANGE UP (ref 10.3–14.5)
SODIUM SERPL-SCNC: 139 MMOL/L — SIGNIFICANT CHANGE UP (ref 135–145)
SODIUM SERPL-SCNC: 139 MMOL/L — SIGNIFICANT CHANGE UP (ref 135–145)
SPECIMEN SOURCE: SIGNIFICANT CHANGE UP
WBC # BLD: 12.07 K/UL — HIGH (ref 3.8–10.5)
WBC # BLD: 12.07 K/UL — HIGH (ref 3.8–10.5)
WBC # FLD AUTO: 12.07 K/UL — HIGH (ref 3.8–10.5)
WBC # FLD AUTO: 12.07 K/UL — HIGH (ref 3.8–10.5)

## 2017-09-01 PROCEDURE — 99233 SBSQ HOSP IP/OBS HIGH 50: CPT

## 2017-09-01 PROCEDURE — 99232 SBSQ HOSP IP/OBS MODERATE 35: CPT | Mod: GC

## 2017-09-01 RX ORDER — POTASSIUM CHLORIDE 20 MEQ
40 PACKET (EA) ORAL ONCE
Qty: 0 | Refills: 0 | Status: COMPLETED | OUTPATIENT
Start: 2017-09-01 | End: 2017-09-01

## 2017-09-01 RX ORDER — SIMETHICONE 80 MG/1
80 TABLET, CHEWABLE ORAL ONCE
Qty: 0 | Refills: 0 | Status: COMPLETED | OUTPATIENT
Start: 2017-09-01 | End: 2017-09-01

## 2017-09-01 RX ORDER — LANOLIN ALCOHOL/MO/W.PET/CERES
3 CREAM (GRAM) TOPICAL AT BEDTIME
Qty: 0 | Refills: 0 | Status: DISCONTINUED | OUTPATIENT
Start: 2017-09-01 | End: 2017-09-08

## 2017-09-01 RX ADMIN — SODIUM CHLORIDE 75 MILLILITER(S): 9 INJECTION INTRAMUSCULAR; INTRAVENOUS; SUBCUTANEOUS at 04:32

## 2017-09-01 RX ADMIN — TAMSULOSIN HYDROCHLORIDE 0.4 MILLIGRAM(S): 0.4 CAPSULE ORAL at 21:08

## 2017-09-01 RX ADMIN — Medication 3 MILLIGRAM(S): at 23:37

## 2017-09-01 RX ADMIN — SODIUM CHLORIDE 75 MILLILITER(S): 9 INJECTION INTRAMUSCULAR; INTRAVENOUS; SUBCUTANEOUS at 16:55

## 2017-09-01 RX ADMIN — GABAPENTIN 100 MILLIGRAM(S): 400 CAPSULE ORAL at 05:53

## 2017-09-01 RX ADMIN — Medication 1 TABLET(S): at 08:11

## 2017-09-01 RX ADMIN — GABAPENTIN 100 MILLIGRAM(S): 400 CAPSULE ORAL at 14:59

## 2017-09-01 RX ADMIN — Medication 200 MILLIGRAM(S): at 11:05

## 2017-09-01 RX ADMIN — LISINOPRIL 2.5 MILLIGRAM(S): 2.5 TABLET ORAL at 05:54

## 2017-09-01 RX ADMIN — Medication 100 MILLIGRAM(S): at 05:54

## 2017-09-01 RX ADMIN — Medication 1: at 16:56

## 2017-09-01 RX ADMIN — SIMETHICONE 80 MILLIGRAM(S): 80 TABLET, CHEWABLE ORAL at 13:23

## 2017-09-01 RX ADMIN — SIMETHICONE 80 MILLIGRAM(S): 80 TABLET, CHEWABLE ORAL at 21:08

## 2017-09-01 RX ADMIN — Medication 40 MILLIEQUIVALENT(S): at 11:05

## 2017-09-01 RX ADMIN — Medication 2: at 12:49

## 2017-09-01 RX ADMIN — GABAPENTIN 100 MILLIGRAM(S): 400 CAPSULE ORAL at 21:08

## 2017-09-01 RX ADMIN — Medication 100 MILLIGRAM(S): at 14:58

## 2017-09-01 RX ADMIN — ENOXAPARIN SODIUM 40 MILLIGRAM(S): 100 INJECTION SUBCUTANEOUS at 11:05

## 2017-09-01 NOTE — PROGRESS NOTE ADULT - SUBJECTIVE AND OBJECTIVE BOX
Chief Complaint:  Patient is a 62y old  Male who presents with a chief complaint of ischemic colitis (28 Aug 2017 11:59)      Interval Events: Patient reports improvement. He has no pain now. He did have 2 bowel movements in the last 24 hours that was soft. He is tolerating a diet, no nausea/vomiting.    Allergies:  No Known Allergies      Hospital Medications:  sodium chloride 0.9%. 1000 milliLiter(s) IV Continuous <Continuous>  acetaminophen   Tablet 650 milliGRAM(s) Oral every 6 hours PRN  acetaminophen   Tablet. 650 milliGRAM(s) Oral every 6 hours PRN  tamsulosin 0.4 milliGRAM(s) Oral at bedtime  dextrose 5%. 1000 milliLiter(s) IV Continuous <Continuous>  dextrose Gel 1 Dose(s) Oral once PRN  dextrose 50% Injectable 12.5 Gram(s) IV Push once  dextrose 50% Injectable 25 Gram(s) IV Push once  dextrose 50% Injectable 25 Gram(s) IV Push once  glucagon  Injectable 1 milliGRAM(s) IntraMuscular once PRN  sodium biphosphate Rectal Enema 1 Enema Rectal once  morphine  - Injectable 2 milliGRAM(s) IV Push every 4 hours PRN  insulin lispro (HumaLOG) corrective regimen sliding scale   SubCutaneous three times a day before meals  insulin lispro (HumaLOG) corrective regimen sliding scale   SubCutaneous at bedtime  ciprofloxacin   IVPB 400 milliGRAM(s) IV Intermittent every 12 hours  gabapentin 100 milliGRAM(s) Oral three times a day  ciprofloxacin   IVPB   IV Intermittent   metroNIDAZOLE  IVPB   IV Intermittent   metroNIDAZOLE  IVPB 500 milliGRAM(s) IV Intermittent every 8 hours  enoxaparin Injectable 40 milliGRAM(s) SubCutaneous daily  lisinopril 2.5 milliGRAM(s) Oral daily      PMHX/PSHX:  Ischemic colitis  Diabetes mellitus type 2, noninsulin dependent  No significant past surgical history      Family history:  Family history of diabetes mellitus in brother (Sibling)      ROS:     General:  No wt loss, fevers, chills, night sweats, fatigue,   Eyes:  Good vision, no reported pain  ENT:  No sore throat, pain, runny nose, dysphagia  CV:  No pain, palpitations, hypo/hypertension  Resp:  No dyspnea, cough, tachypnea, wheezing  GI:  See HPI  :  No pain, bleeding, incontinence, nocturia  Muscle:  No pain, weakness  Neuro:  No weakness, tingling, memory problems  Psych:  No fatigue, insomnia, mood problems, depression  Endocrine:  No polyuria, polydipsia, cold/heat intolerance  Heme:  No petechiae, ecchymosis, easy bruisability  Skin:  No rash, edema      PHYSICAL EXAM:   Vital Signs:  Vital Signs Last 24 Hrs  T(C): 36.8 (01 Sep 2017 05:49), Max: 36.8 (01 Sep 2017 05:49)  T(F): 98.2 (01 Sep 2017 05:49), Max: 98.2 (01 Sep 2017 05:49)  HR: 91 (01 Sep 2017 05:49) (91 - 102)  BP: 137/81 (01 Sep 2017 05:49) (114/71 - 137/81)  BP(mean): --  RR: 18 (01 Sep 2017 05:49) (16 - 18)  SpO2: 99% (01 Sep 2017 05:49) (98% - 99%)  Daily     Daily     GENERAL:  Appears stated age, well-groomed, well-nourished, no distress  HEENT:  NC/AT,  conjunctivae clear, sclera -anicteric  CHEST:  Full & symmetric excursion, no increased effort, breath sounds clear  HEART:  Regular rhythm, S1, S2, no murmur/rub/S3/S4,  no edema  ABDOMEN:  Soft, non-tender, non-distended, normoactive bowel sounds  EXTREMITIES:  no cyanosis,clubbing or edema  SKIN:  No rash/erythema/ecchymoses/petechiae/wounds/abscess/warm/dry  NEURO:  Alert, oriented,     LABS:                        10.6   12.07 )-----------( 389      ( 01 Sep 2017 05:20 )             31.2     09-01    139  |  101  |  4<L>  ----------------------------<  151<H>  3.4<L>   |  21<L>  |  0.60    Ca    8.2<L>      01 Sep 2017 05:20  Phos  3.0     09-01  Mg     1.8     09-01    TPro  6.4  /  Alb  2.7<L>  /  TBili  0.3  /  DBili  x   /  AST  16  /  ALT  20  /  AlkPhos  49  09-01    LIVER FUNCTIONS - ( 01 Sep 2017 05:20 )  Alb: 2.7 g/dL / Pro: 6.4 g/dL / ALK PHOS: 49 u/L / ALT: 20 u/L / AST: 16 u/L / GGT: x

## 2017-09-01 NOTE — PROGRESS NOTE ADULT - SUBJECTIVE AND OBJECTIVE BOX
Patient is a 62y old  Male who presents with a chief complaint of ischemic colitis (28 Aug 2017 11:59)      SUBJECTIVE / OVERNIGHT EVENTS:  Pt reports feels better overall. no diarrhea. mild intermittent abd pain.     MEDICATIONS  (STANDING):  sodium chloride 0.9%. 1000 milliLiter(s) (75 mL/Hr) IV Continuous <Continuous>  tamsulosin 0.4 milliGRAM(s) Oral at bedtime  dextrose 5%. 1000 milliLiter(s) (50 mL/Hr) IV Continuous <Continuous>  dextrose 50% Injectable 12.5 Gram(s) IV Push once  dextrose 50% Injectable 25 Gram(s) IV Push once  dextrose 50% Injectable 25 Gram(s) IV Push once  sodium biphosphate Rectal Enema 1 Enema Rectal once  insulin lispro (HumaLOG) corrective regimen sliding scale   SubCutaneous three times a day before meals  insulin lispro (HumaLOG) corrective regimen sliding scale   SubCutaneous at bedtime  ciprofloxacin   IVPB 400 milliGRAM(s) IV Intermittent every 12 hours  gabapentin 100 milliGRAM(s) Oral three times a day  ciprofloxacin   IVPB   IV Intermittent   metroNIDAZOLE  IVPB   IV Intermittent   metroNIDAZOLE  IVPB 500 milliGRAM(s) IV Intermittent every 8 hours  enoxaparin Injectable 40 milliGRAM(s) SubCutaneous daily  lisinopril 2.5 milliGRAM(s) Oral daily    MEDICATIONS  (PRN):  acetaminophen   Tablet 650 milliGRAM(s) Oral every 6 hours PRN For Temp greater than 38 C (100.4 F)  acetaminophen   Tablet. 650 milliGRAM(s) Oral every 6 hours PRN Mild Pain (1 - 3)  dextrose Gel 1 Dose(s) Oral once PRN Blood Glucose LESS THAN 70 milliGRAM(s)/deciliter  glucagon  Injectable 1 milliGRAM(s) IntraMuscular once PRN Glucose LESS THAN 70 milligrams/deciliter  morphine  - Injectable 2 milliGRAM(s) IV Push every 4 hours PRN Moderate Pain (4 - 6)      T(C): 37.6 (09-01-17 @ 14:48), Max: 37.6 (09-01-17 @ 14:48)  HR: 94 (09-01-17 @ 14:48) (91 - 102)  BP: 132/80 (09-01-17 @ 14:48) (114/71 - 137/81)  RR: 18 (09-01-17 @ 14:48) (16 - 18)  SpO2: 100% (09-01-17 @ 14:48) (98% - 100%)  CAPILLARY BLOOD GLUCOSE  164 (01 Sep 2017 16:35)  249 (01 Sep 2017 12:21)  147 (01 Sep 2017 08:50)  141 (31 Aug 2017 23:17)        I&O's Summary    31 Aug 2017 07:01  -  01 Sep 2017 07:00  --------------------------------------------------------  IN: 0 mL / OUT: 1600 mL / NET: -1600 mL        PHYSICAL EXAM:  GENERAL: NAD, well-developed  HEAD:  Atraumatic, Normocephalic  EYES: EOMI, PERRLA, conjunctiva and sclera clear  NECK: Supple, No JVD  CHEST/LUNG: Clear to auscultation bilaterally; No wheeze  HEART: s1 s2, regular rhythm and rate   ABDOMEN: Soft, Nontender, Nondistended; Bowel sounds present  EXTREMITIES:  2+ Peripheral Pulses, No clubbing, cyanosis, or edema  PSYCH: AAOx3, calm   NEUROLOGY: non-focal  SKIN: No rashes or lesions    LABS:                        10.6   12.07 )-----------( 389      ( 01 Sep 2017 05:20 )             31.2     09-01    139  |  101  |  4<L>  ----------------------------<  151<H>  3.4<L>   |  21<L>  |  0.60    Ca    8.2<L>      01 Sep 2017 05:20  Phos  3.0     09-01  Mg     1.8     09-01    TPro  6.4  /  Alb  2.7<L>  /  TBili  0.3  /  DBili  x   /  AST  16  /  ALT  20  /  AlkPhos  49  09-01              RADIOLOGY & ADDITIONAL TESTS:    Imaging Personally Reviewed:    Consultant(s) Notes Reviewed:      Care Discussed with Consultants/Other Providers:

## 2017-09-01 NOTE — PROGRESS NOTE ADULT - SUBJECTIVE AND OBJECTIVE BOX
INTERVAL HPI/OVERNIGHT EVENTS:  Patient is a 62yMale  Pt feels much better today. He reports little to no pain since yesterday. He also reports that his lower extremities are no longer bothering him.       Vital Signs Last 24 Hrs  T(C): 36.8 (01 Sep 2017 05:49), Max: 36.8 (01 Sep 2017 05:49)  T(F): 98.2 (01 Sep 2017 05:49), Max: 98.2 (01 Sep 2017 05:49)  HR: 91 (01 Sep 2017 05:49) (91 - 107)  BP: 137/81 (01 Sep 2017 05:49) (114/71 - 140/89)  BP(mean): --  RR: 18 (01 Sep 2017 05:49) (16 - 18)  SpO2: 99% (01 Sep 2017 05:49) (98% - 100%)  08-31 @ 07:01  -  09-01 @ 07:00  --------------------------------------------------------  IN: 0 mL / OUT: 1600 mL / NET: -1600 mL        PHYSICAL EXAM:  Constitutional: well developed, well nourished, NAD  Eyes: anicteric  ENMT: normal facies, symmetric  Neck: supple  Respiratory: CTA bilat  Cardiovascular: RRR  Gastrointestinal: abdomen soft, nontender, nondistended. No obvious masses. No peritonitis  Extremities: FROM, warm  Neurological: intact, non-focal  Skin: no gross lesions  Lymph Nodes: no gross adenopathy  Musculoskeletal: equal strength bilateral upper and lower extremities  Psychiatric: oriented x 3; appropriate          LABS:                        10.6   12.07 )-----------( 389      ( 01 Sep 2017 05:20 )             31.2     09-01    139  |  101  |  4<L>  ----------------------------<  151<H>  3.4<L>   |  21<L>  |  0.60    Ca    8.2<L>      01 Sep 2017 05:20  Phos  3.0     09-01  Mg     1.8     09-01    TPro  6.4  /  Alb  2.7<L>  /  TBili  0.3  /  DBili  x   /  AST  16  /  ALT  20  /  AlkPhos  49  09-01        Phosphorus Level, Serum: 3.0 mg/dL (09-01 @ 05:20)  Magnesium, Serum: 1.8 mg/dL (09-01 @ 05:20)  Phosphorus Level, Serum: 3.0 mg/dL (09-01 @ 05:20)  Magnesium, Serum: 1.8 mg/dL (09-01 @ 05:20)

## 2017-09-01 NOTE — PROGRESS NOTE ADULT - ASSESSMENT
Impression:  1. RectoSigmoid colitis - differential includes ischemic colitis, less likely IBD (Crohn's vs UC), infectious    Recommend:  -Would follow-up stool cultures and O&P final results  -Reviewed the pathology from [facility] - The biopsy done there on 8/18 shows colonic mucosa with ischemic colitis and erosion. The stains were engative for cytomegalovirus, and the findings are consistent with ischemic colitis per the report (in chart faxed over)  -Given these biopsy results, would continue the course of antibiotics as this is severe ischemic colitis  -Would have patient repeat endoscopy in a few weeks to re-evaluate the area for healing  -Colorectal surgery follow-up appreciated  -Low residual diet as tolerated

## 2017-09-01 NOTE — PROGRESS NOTE ADULT - ATTENDING COMMENTS
Pt seen and examined. Agree with fellow's note. Plan discussed with patient and primary team.     Patient had chief complaint of colitis    Data:   Cdif/stool cx/O&P negative  Flex sig path: - Fibrinopurulent material, see note.Colonic mucosa with focal erosion and granulation tissue.    Imp: Colitis, presumably ischemic.  normocytic anemia    Plan:  -check anemia panel  -appreciate surgical notes.   -pt has already had abx course for ischemic colitis.   -Would repeat QFN and consult ID if positive  -Outpatient gi f/u in 1 month of discharge [phone]  -Need to follow up on pathology for final CMV read.   -Will sign off.     25 min spent in patient care, >50% spent in care coordination/counseilng, reviewing radiology, outside hospital records, discussing management with patient.

## 2017-09-01 NOTE — PROGRESS NOTE ADULT - ASSESSMENT
62yom PMHx of DM2 (on metformin) presents as a transfer from [facility] for ischemic colitis for second opinion regarding non-surgical options available.

## 2017-09-01 NOTE — PROGRESS NOTE ADULT - ATTENDING COMMENTS
distal sigmoid colitis - ischemic vs IBD - improving    Pt improving with IV abx  No surgical intervention indicated at this point. WOuld continue to monitor and consider switching to PO abx and watch on PO for 24hrs then DC home. He can f/u in my office as an outpt.

## 2017-09-02 LAB
BACTERIA STL CULT: SIGNIFICANT CHANGE UP
BUN SERPL-MCNC: 4 MG/DL — LOW (ref 7–23)
CALCIUM SERPL-MCNC: 8.4 MG/DL — SIGNIFICANT CHANGE UP (ref 8.4–10.5)
CHLORIDE SERPL-SCNC: 100 MMOL/L — SIGNIFICANT CHANGE UP (ref 98–107)
CO2 SERPL-SCNC: 24 MMOL/L — SIGNIFICANT CHANGE UP (ref 22–31)
CREAT SERPL-MCNC: 0.6 MG/DL — SIGNIFICANT CHANGE UP (ref 0.5–1.3)
GLUCOSE SERPL-MCNC: 159 MG/DL — HIGH (ref 70–99)
HCT VFR BLD CALC: 30.9 % — LOW (ref 39–50)
HGB BLD-MCNC: 10.4 G/DL — LOW (ref 13–17)
MAGNESIUM SERPL-MCNC: 1.8 MG/DL — SIGNIFICANT CHANGE UP (ref 1.6–2.6)
MCHC RBC-ENTMCNC: 27.8 PG — SIGNIFICANT CHANGE UP (ref 27–34)
MCHC RBC-ENTMCNC: 33.7 % — SIGNIFICANT CHANGE UP (ref 32–36)
MCV RBC AUTO: 82.6 FL — SIGNIFICANT CHANGE UP (ref 80–100)
NRBC # FLD: 0 — SIGNIFICANT CHANGE UP
PHOSPHATE SERPL-MCNC: 3 MG/DL — SIGNIFICANT CHANGE UP (ref 2.5–4.5)
PLATELET # BLD AUTO: 428 K/UL — HIGH (ref 150–400)
PMV BLD: 8.9 FL — SIGNIFICANT CHANGE UP (ref 7–13)
POTASSIUM SERPL-MCNC: 3.8 MMOL/L — SIGNIFICANT CHANGE UP (ref 3.5–5.3)
POTASSIUM SERPL-SCNC: 3.8 MMOL/L — SIGNIFICANT CHANGE UP (ref 3.5–5.3)
RBC # BLD: 3.74 M/UL — LOW (ref 4.2–5.8)
RBC # FLD: 14.1 % — SIGNIFICANT CHANGE UP (ref 10.3–14.5)
SODIUM SERPL-SCNC: 136 MMOL/L — SIGNIFICANT CHANGE UP (ref 135–145)
WBC # BLD: 9.9 K/UL — SIGNIFICANT CHANGE UP (ref 3.8–10.5)
WBC # FLD AUTO: 9.9 K/UL — SIGNIFICANT CHANGE UP (ref 3.8–10.5)

## 2017-09-02 PROCEDURE — 99233 SBSQ HOSP IP/OBS HIGH 50: CPT

## 2017-09-02 RX ORDER — CIPROFLOXACIN LACTATE 400MG/40ML
500 VIAL (ML) INTRAVENOUS EVERY 12 HOURS
Qty: 0 | Refills: 0 | Status: DISCONTINUED | OUTPATIENT
Start: 2017-09-02 | End: 2017-09-04

## 2017-09-02 RX ORDER — METRONIDAZOLE 500 MG
500 TABLET ORAL EVERY 8 HOURS
Qty: 0 | Refills: 0 | Status: DISCONTINUED | OUTPATIENT
Start: 2017-09-02 | End: 2017-09-04

## 2017-09-02 RX ADMIN — SODIUM CHLORIDE 75 MILLILITER(S): 9 INJECTION INTRAMUSCULAR; INTRAVENOUS; SUBCUTANEOUS at 17:07

## 2017-09-02 RX ADMIN — SODIUM CHLORIDE 75 MILLILITER(S): 9 INJECTION INTRAMUSCULAR; INTRAVENOUS; SUBCUTANEOUS at 05:27

## 2017-09-02 RX ADMIN — GABAPENTIN 100 MILLIGRAM(S): 400 CAPSULE ORAL at 15:15

## 2017-09-02 RX ADMIN — Medication 1: at 08:44

## 2017-09-02 RX ADMIN — Medication 500 MILLIGRAM(S): at 15:15

## 2017-09-02 RX ADMIN — Medication 500 MILLIGRAM(S): at 17:07

## 2017-09-02 RX ADMIN — GABAPENTIN 100 MILLIGRAM(S): 400 CAPSULE ORAL at 22:01

## 2017-09-02 RX ADMIN — Medication 2: at 12:31

## 2017-09-02 RX ADMIN — Medication 500 MILLIGRAM(S): at 22:01

## 2017-09-02 RX ADMIN — GABAPENTIN 100 MILLIGRAM(S): 400 CAPSULE ORAL at 05:27

## 2017-09-02 RX ADMIN — Medication 3 MILLIGRAM(S): at 22:01

## 2017-09-02 RX ADMIN — ENOXAPARIN SODIUM 40 MILLIGRAM(S): 100 INJECTION SUBCUTANEOUS at 12:32

## 2017-09-02 RX ADMIN — LISINOPRIL 2.5 MILLIGRAM(S): 2.5 TABLET ORAL at 05:27

## 2017-09-02 RX ADMIN — TAMSULOSIN HYDROCHLORIDE 0.4 MILLIGRAM(S): 0.4 CAPSULE ORAL at 22:01

## 2017-09-02 NOTE — PROGRESS NOTE ADULT - ASSESSMENT
62yom PMHx of DM2 (on metformin) presents as a transfer from [facility] for ischemic colitis for second opinion regarding non-surgical options available.

## 2017-09-02 NOTE — PROGRESS NOTE ADULT - SUBJECTIVE AND OBJECTIVE BOX
Patient is a 62y old  Male who presents with a chief complaint of ischemic colitis (28 Aug 2017 11:59)      SUBJECTIVE / OVERNIGHT EVENTS:    MEDICATIONS  (STANDING):  sodium chloride 0.9%. 1000 milliLiter(s) (75 mL/Hr) IV Continuous <Continuous>  tamsulosin 0.4 milliGRAM(s) Oral at bedtime  dextrose 5%. 1000 milliLiter(s) (50 mL/Hr) IV Continuous <Continuous>  dextrose 50% Injectable 12.5 Gram(s) IV Push once  dextrose 50% Injectable 25 Gram(s) IV Push once  dextrose 50% Injectable 25 Gram(s) IV Push once  sodium biphosphate Rectal Enema 1 Enema Rectal once  insulin lispro (HumaLOG) corrective regimen sliding scale   SubCutaneous three times a day before meals  insulin lispro (HumaLOG) corrective regimen sliding scale   SubCutaneous at bedtime  gabapentin 100 milliGRAM(s) Oral three times a day  enoxaparin Injectable 40 milliGRAM(s) SubCutaneous daily  lisinopril 2.5 milliGRAM(s) Oral daily  ciprofloxacin     Tablet 500 milliGRAM(s) Oral every 12 hours  metroNIDAZOLE    Tablet 500 milliGRAM(s) Oral every 8 hours    MEDICATIONS  (PRN):  acetaminophen   Tablet 650 milliGRAM(s) Oral every 6 hours PRN For Temp greater than 38 C (100.4 F)  acetaminophen   Tablet. 650 milliGRAM(s) Oral every 6 hours PRN Mild Pain (1 - 3)  dextrose Gel 1 Dose(s) Oral once PRN Blood Glucose LESS THAN 70 milliGRAM(s)/deciliter  glucagon  Injectable 1 milliGRAM(s) IntraMuscular once PRN Glucose LESS THAN 70 milligrams/deciliter  morphine  - Injectable 2 milliGRAM(s) IV Push every 4 hours PRN Moderate Pain (4 - 6)  melatonin 3 milliGRAM(s) Oral at bedtime PRN Insomnia      Vital Signs Last 24 Hrs  T(C): 37 (02 Sep 2017 14:28), Max: 37.3 (01 Sep 2017 21:07)  T(F): 98.6 (02 Sep 2017 14:28), Max: 99.2 (01 Sep 2017 21:07)  HR: 88 (02 Sep 2017 14:28) (88 - 97)  BP: 125/77 (02 Sep 2017 14:28) (116/70 - 126/72)  BP(mean): --  RR: 17 (02 Sep 2017 14:28) (17 - 18)  SpO2: 99% (02 Sep 2017 14:28) (98% - 100%)  CAPILLARY BLOOD GLUCOSE  236 (02 Sep 2017 11:35)  166 (02 Sep 2017 08:34)  227 (01 Sep 2017 22:18)  164 (01 Sep 2017 16:35)        I&O's Summary      PHYSICAL EXAM:  GENERAL: NAD, well-developed  HEAD:  Atraumatic, Normocephalic  EYES: EOMI, PERRLA, conjunctiva and sclera clear  NECK: Supple, No JVD  CHEST/LUNG: Clear to auscultation bilaterally; No wheeze  HEART: Regular rate and rhythm; No murmurs, rubs, or gallops  ABDOMEN: Soft, Nontender, Nondistended; Bowel sounds present  EXTREMITIES:  2+ Peripheral Pulses, No clubbing, cyanosis, or edema  PSYCH: AAOx3  NEUROLOGY: non-focal  SKIN: No rashes or lesions    LABS:                        10.4   9.90  )-----------( 428      ( 02 Sep 2017 06:00 )             30.9     09-02    136  |  100  |  4<L>  ----------------------------<  159<H>  3.8   |  24  |  0.60    Ca    8.4      02 Sep 2017 06:00  Phos  3.0     09-02  Mg     1.8     09-02    TPro  6.4  /  Alb  2.7<L>  /  TBili  0.3  /  DBili  x   /  AST  16  /  ALT  20  /  AlkPhos  49  09-01              RADIOLOGY & ADDITIONAL TESTS:    Imaging Personally Reviewed:    Consultant(s) Notes Reviewed:      Care Discussed with Consultants/Other Providers: Patient is a 62y old  Male who presents with a chief complaint of ischemic colitis (28 Aug 2017 11:59)      SUBJECTIVE / OVERNIGHT EVENTS:    MEDICATIONS  (STANDING):  sodium chloride 0.9%. 1000 milliLiter(s) (75 mL/Hr) IV Continuous <Continuous>  tamsulosin 0.4 milliGRAM(s) Oral at bedtime  dextrose 5%. 1000 milliLiter(s) (50 mL/Hr) IV Continuous <Continuous>  dextrose 50% Injectable 12.5 Gram(s) IV Push once  dextrose 50% Injectable 25 Gram(s) IV Push once  dextrose 50% Injectable 25 Gram(s) IV Push once  sodium biphosphate Rectal Enema 1 Enema Rectal once  insulin lispro (HumaLOG) corrective regimen sliding scale   SubCutaneous three times a day before meals  insulin lispro (HumaLOG) corrective regimen sliding scale   SubCutaneous at bedtime  gabapentin 100 milliGRAM(s) Oral three times a day  enoxaparin Injectable 40 milliGRAM(s) SubCutaneous daily  lisinopril 2.5 milliGRAM(s) Oral daily  ciprofloxacin     Tablet 500 milliGRAM(s) Oral every 12 hours  metroNIDAZOLE    Tablet 500 milliGRAM(s) Oral every 8 hours    MEDICATIONS  (PRN):  acetaminophen   Tablet 650 milliGRAM(s) Oral every 6 hours PRN For Temp greater than 38 C (100.4 F)  acetaminophen   Tablet. 650 milliGRAM(s) Oral every 6 hours PRN Mild Pain (1 - 3)  dextrose Gel 1 Dose(s) Oral once PRN Blood Glucose LESS THAN 70 milliGRAM(s)/deciliter  glucagon  Injectable 1 milliGRAM(s) IntraMuscular once PRN Glucose LESS THAN 70 milligrams/deciliter  morphine  - Injectable 2 milliGRAM(s) IV Push every 4 hours PRN Moderate Pain (4 - 6)  melatonin 3 milliGRAM(s) Oral at bedtime PRN Insomnia      Vital Signs Last 24 Hrs  T(C): 37 (02 Sep 2017 14:28), Max: 37.3 (01 Sep 2017 21:07)  T(F): 98.6 (02 Sep 2017 14:28), Max: 99.2 (01 Sep 2017 21:07)  HR: 88 (02 Sep 2017 14:28) (88 - 97)  BP: 125/77 (02 Sep 2017 14:28) (116/70 - 126/72)  BP(mean): --  RR: 17 (02 Sep 2017 14:28) (17 - 18)  SpO2: 99% (02 Sep 2017 14:28) (98% - 100%)  CAPILLARY BLOOD GLUCOSE  236 (02 Sep 2017 11:35)  166 (02 Sep 2017 08:34)  227 (01 Sep 2017 22:18)  164 (01 Sep 2017 16:35)        I&O's Summary      PHYSICAL EXAM:  GENERAL: NAD, well-developed  HEAD:  Atraumatic, Normocephalic  EYES: EOMI, PERRLA, conjunctiva and sclera clear  NECK: Supple, No JVD  CHEST/LUNG: Clear to auscultation bilaterally;   HEART: Regular rate and rhythm; No murmurs, rubs, or gallops  ABDOMEN: Soft, Nontender, Nondistended; Bowel sounds present  EXTREMITIES:   No clubbing, cyanosis, or edema  PSYCH: AAOx3  NEUROLOGY: non-focal  SKIN: No rashes or lesions    LABS:                        10.4   9.90  )-----------( 428      ( 02 Sep 2017 06:00 )             30.9     09-02    136  |  100  |  4<L>  ----------------------------<  159<H>  3.8   |  24  |  0.60    Ca    8.4      02 Sep 2017 06:00  Phos  3.0     09-02  Mg     1.8     09-02    TPro  6.4  /  Alb  2.7<L>  /  TBili  0.3  /  DBili  x   /  AST  16  /  ALT  20  /  AlkPhos  49  09-01              RADIOLOGY & ADDITIONAL TESTS:    Imaging Personally Reviewed:    Consultant(s) Notes Reviewed:  GI, colorectal SX     Care Discussed with Consultants/Other Providers:

## 2017-09-03 LAB
BUN SERPL-MCNC: 4 MG/DL — LOW (ref 7–23)
CALCIUM SERPL-MCNC: 8.5 MG/DL — SIGNIFICANT CHANGE UP (ref 8.4–10.5)
CHLORIDE SERPL-SCNC: 98 MMOL/L — SIGNIFICANT CHANGE UP (ref 98–107)
CO2 SERPL-SCNC: 24 MMOL/L — SIGNIFICANT CHANGE UP (ref 22–31)
CREAT SERPL-MCNC: 0.56 MG/DL — SIGNIFICANT CHANGE UP (ref 0.5–1.3)
GLUCOSE SERPL-MCNC: 169 MG/DL — HIGH (ref 70–99)
HCT VFR BLD CALC: 30.9 % — LOW (ref 39–50)
HGB BLD-MCNC: 10.4 G/DL — LOW (ref 13–17)
MAGNESIUM SERPL-MCNC: 1.8 MG/DL — SIGNIFICANT CHANGE UP (ref 1.6–2.6)
MCHC RBC-ENTMCNC: 28 PG — SIGNIFICANT CHANGE UP (ref 27–34)
MCHC RBC-ENTMCNC: 33.7 % — SIGNIFICANT CHANGE UP (ref 32–36)
MCV RBC AUTO: 83.1 FL — SIGNIFICANT CHANGE UP (ref 80–100)
NRBC # FLD: 0 — SIGNIFICANT CHANGE UP
PHOSPHATE SERPL-MCNC: 3 MG/DL — SIGNIFICANT CHANGE UP (ref 2.5–4.5)
PLATELET # BLD AUTO: 449 K/UL — HIGH (ref 150–400)
PMV BLD: 8.6 FL — SIGNIFICANT CHANGE UP (ref 7–13)
POTASSIUM SERPL-MCNC: 3.7 MMOL/L — SIGNIFICANT CHANGE UP (ref 3.5–5.3)
POTASSIUM SERPL-SCNC: 3.7 MMOL/L — SIGNIFICANT CHANGE UP (ref 3.5–5.3)
RBC # BLD: 3.72 M/UL — LOW (ref 4.2–5.8)
RBC # FLD: 13.9 % — SIGNIFICANT CHANGE UP (ref 10.3–14.5)
SODIUM SERPL-SCNC: 136 MMOL/L — SIGNIFICANT CHANGE UP (ref 135–145)
WBC # BLD: 8.4 K/UL — SIGNIFICANT CHANGE UP (ref 3.8–10.5)
WBC # FLD AUTO: 8.4 K/UL — SIGNIFICANT CHANGE UP (ref 3.8–10.5)

## 2017-09-03 PROCEDURE — 99233 SBSQ HOSP IP/OBS HIGH 50: CPT

## 2017-09-03 PROCEDURE — 71010: CPT | Mod: 26

## 2017-09-03 PROCEDURE — 93010 ELECTROCARDIOGRAM REPORT: CPT

## 2017-09-03 RX ORDER — SIMETHICONE 80 MG/1
80 TABLET, CHEWABLE ORAL EVERY 6 HOURS
Qty: 0 | Refills: 0 | Status: DISCONTINUED | OUTPATIENT
Start: 2017-09-03 | End: 2017-09-08

## 2017-09-03 RX ADMIN — TAMSULOSIN HYDROCHLORIDE 0.4 MILLIGRAM(S): 0.4 CAPSULE ORAL at 23:00

## 2017-09-03 RX ADMIN — GABAPENTIN 100 MILLIGRAM(S): 400 CAPSULE ORAL at 05:40

## 2017-09-03 RX ADMIN — Medication 500 MILLIGRAM(S): at 13:00

## 2017-09-03 RX ADMIN — Medication 500 MILLIGRAM(S): at 17:16

## 2017-09-03 RX ADMIN — Medication 500 MILLIGRAM(S): at 05:40

## 2017-09-03 RX ADMIN — GABAPENTIN 100 MILLIGRAM(S): 400 CAPSULE ORAL at 22:59

## 2017-09-03 RX ADMIN — LISINOPRIL 2.5 MILLIGRAM(S): 2.5 TABLET ORAL at 05:40

## 2017-09-03 RX ADMIN — Medication 2: at 13:00

## 2017-09-03 RX ADMIN — Medication 500 MILLIGRAM(S): at 23:03

## 2017-09-03 RX ADMIN — SODIUM CHLORIDE 75 MILLILITER(S): 9 INJECTION INTRAMUSCULAR; INTRAVENOUS; SUBCUTANEOUS at 05:40

## 2017-09-03 RX ADMIN — Medication 2: at 17:16

## 2017-09-03 RX ADMIN — ENOXAPARIN SODIUM 40 MILLIGRAM(S): 100 INJECTION SUBCUTANEOUS at 13:00

## 2017-09-03 RX ADMIN — Medication 650 MILLIGRAM(S): at 17:16

## 2017-09-03 RX ADMIN — SIMETHICONE 80 MILLIGRAM(S): 80 TABLET, CHEWABLE ORAL at 23:03

## 2017-09-03 RX ADMIN — GABAPENTIN 100 MILLIGRAM(S): 400 CAPSULE ORAL at 13:00

## 2017-09-03 RX ADMIN — Medication 3 MILLIGRAM(S): at 22:59

## 2017-09-03 RX ADMIN — SIMETHICONE 80 MILLIGRAM(S): 80 TABLET, CHEWABLE ORAL at 11:15

## 2017-09-03 NOTE — CONSULT NOTE ADULT - SUBJECTIVE AND OBJECTIVE BOX
CHIEF COMPLAINT:    62yom PMHx of DM2 (on metformin) presents as a transfer from [facility] for ischemic colitis.         Pt had a colonoscopy 7/24 for evaluation for evaluation of hemorrhoids and lower abdominal/rectal discomfort. The colonoscopy ( by Gastroenterologist Dr. Wolfe)  showed sigmoid thickening and biopsies showed findings concerning for ischemic colitis. Pt was admitted to [facility] for further evaluation on 8/16. Per chart, CT A/P showed sigmoid colitis/proctitis, grossly patent mesenteric arterial vasculature. No acute mesenteric venous thrombosis seen. On 8/17: patient had a sigmoidoscopy by Dr. Leija which showed congested erythematous friable inflamed ulcerated mucosa and biopsy of that also showed ischemic colitis and erosion. 8/20 CT A/P showed worsening colitis-no fluid collection or abscess. Per chart, patient had no melena/bloody bowel movements since admission to [facility].      Pt describes his symptoms as a heavy discomfort in LLQ radiating to the lower abdominal area. He has intermittent BRBPR on diapers and toilet paper but no nausea vomiting, melena. He started having diarrhea last week while at [facility]-per notes cdiff was negative. He is having 1-2 watery brown BM a day with intermittent BRBPR. He also has been having mucous in his bowel movements. These symptoms have been going on for 3 months total.   Patient now says he still has pressure like feeling in his abdomen, no chest pain no SOB no palpitations, pt denies having heart disease in the past     HISTORY OF PRESENT ILLNESS:    PAST MEDICAL & SURGICAL HISTORY:  Ischemic colitis  Diabetes mellitus type 2, noninsulin dependent  No significant past surgical history    MEDICATIONS:  tamsulosin 0.4 milliGRAM(s) Oral at bedtime  enoxaparin Injectable 40 milliGRAM(s) SubCutaneous daily  lisinopril 2.5 milliGRAM(s) Oral daily  ciprofloxacin     Tablet 500 milliGRAM(s) Oral every 12 hours  metroNIDAZOLE    Tablet 500 milliGRAM(s) Oral every 8 hours  acetaminophen   Tablet 650 milliGRAM(s) Oral every 6 hours PRN  acetaminophen   Tablet. 650 milliGRAM(s) Oral every 6 hours PRN  morphine  - Injectable 2 milliGRAM(s) IV Push every 4 hours PRN  gabapentin 100 milliGRAM(s) Oral three times a day  melatonin 3 milliGRAM(s) Oral at bedtime PRN  sodium biphosphate Rectal Enema 1 Enema Rectal once  simethicone 80 milliGRAM(s) Chew every 6 hours PRN  dextrose Gel 1 Dose(s) Oral once PRN  dextrose 50% Injectable 12.5 Gram(s) IV Push once  dextrose 50% Injectable 25 Gram(s) IV Push once  dextrose 50% Injectable 25 Gram(s) IV Push once  glucagon  Injectable 1 milliGRAM(s) IntraMuscular once PRN  insulin lispro (HumaLOG) corrective regimen sliding scale   SubCutaneous three times a day before meals  insulin lispro (HumaLOG) corrective regimen sliding scale   SubCutaneous at bedtime    dextrose 5%. 1000 milliLiter(s) IV Continuous <Continuous>      FAMILY HISTORY:  Family history of diabetes mellitus in brother (Sibling)      SOCIAL HISTORY:    [ ] Non-smoker  [ ] Smoker  [ ] Alcohol    Allergies    No Known Allergies    Intolerance    PHYSICAL EXAM:  T(C): 37.3 (09-03-17 @ 05:38), Max: 37.3 (09-03-17 @ 05:38)  HR: 86 (09-03-17 @ 05:38) (83 - 88)  BP: 122/74 (09-03-17 @ 05:38) (114/72 - 133/60)  RR: 16 (09-03-17 @ 05:38) (16 - 17)  SpO2: 99% (09-03-17 @ 05:38) (97% - 99%)  Wt(kg): --  I&O's Summary      Appearance: Normal	  HEENT:   Normal oral mucosa, PERRL, EOMI	  Cardiovascular: Normal S1 S2, No JVD, No murmurs, No edema  Respiratory: Lungs clear to auscultation	  Gastrointestinal:  mild diffuse abdominal pain  Extremities: Normal range of motion, No clubbing, cyanosis or edema                     10.4   8.40  )-----------( 449      ( 03 Sep 2017 06:21 )             30.9     09-03    136  |  98  |  4<L>  ----------------------------<  169<H>  3.7   |  24  |  0.56    Ca    8.5      03 Sep 2017 06:21  Phos  3.0     09-03  Mg     1.8     09-03

## 2017-09-03 NOTE — CONSULT NOTE ADULT - ASSESSMENT
EKG - not in chart     a/p     1) Ischemic colitis - being treated medically, CT scan shows severe severe recto sigmoid colitis, on flagyl and cipro    2) Abdominal pain - will get 12 lead EKG, no chest pain no SOB, if no surgery here then out pt echo and stress test     3) HTN - on lisinopril     4) DM2 - on insulin

## 2017-09-03 NOTE — PROGRESS NOTE ADULT - ASSESSMENT
62yom PMHx of DM2 (on metformin) presents as a transfer from [facility] for ischemic colitis for second opinion regarding non-surgical options available.

## 2017-09-04 LAB
BUN SERPL-MCNC: 4 MG/DL — LOW (ref 7–23)
CALCIUM SERPL-MCNC: 8.8 MG/DL — SIGNIFICANT CHANGE UP (ref 8.4–10.5)
CHLORIDE SERPL-SCNC: 94 MMOL/L — LOW (ref 98–107)
CO2 SERPL-SCNC: 24 MMOL/L — SIGNIFICANT CHANGE UP (ref 22–31)
CREAT SERPL-MCNC: 0.62 MG/DL — SIGNIFICANT CHANGE UP (ref 0.5–1.3)
GLUCOSE SERPL-MCNC: 184 MG/DL — HIGH (ref 70–99)
HCT VFR BLD CALC: 34.5 % — LOW (ref 39–50)
HGB BLD-MCNC: 12 G/DL — LOW (ref 13–17)
MAGNESIUM SERPL-MCNC: 1.7 MG/DL — SIGNIFICANT CHANGE UP (ref 1.6–2.6)
MCHC RBC-ENTMCNC: 28.9 PG — SIGNIFICANT CHANGE UP (ref 27–34)
MCHC RBC-ENTMCNC: 34.8 % — SIGNIFICANT CHANGE UP (ref 32–36)
MCV RBC AUTO: 83.1 FL — SIGNIFICANT CHANGE UP (ref 80–100)
NRBC # FLD: 0 — SIGNIFICANT CHANGE UP
PHOSPHATE SERPL-MCNC: 3.1 MG/DL — SIGNIFICANT CHANGE UP (ref 2.5–4.5)
PLATELET # BLD AUTO: 532 K/UL — HIGH (ref 150–400)
PMV BLD: 8.8 FL — SIGNIFICANT CHANGE UP (ref 7–13)
POTASSIUM SERPL-MCNC: 3.8 MMOL/L — SIGNIFICANT CHANGE UP (ref 3.5–5.3)
POTASSIUM SERPL-SCNC: 3.8 MMOL/L — SIGNIFICANT CHANGE UP (ref 3.5–5.3)
RBC # BLD: 4.15 M/UL — LOW (ref 4.2–5.8)
RBC # FLD: 13.8 % — SIGNIFICANT CHANGE UP (ref 10.3–14.5)
SODIUM SERPL-SCNC: 133 MMOL/L — LOW (ref 135–145)
SPECIMEN SOURCE: SIGNIFICANT CHANGE UP
SPECIMEN SOURCE: SIGNIFICANT CHANGE UP
WBC # BLD: 16.54 K/UL — HIGH (ref 3.8–10.5)
WBC # FLD AUTO: 16.54 K/UL — HIGH (ref 3.8–10.5)

## 2017-09-04 PROCEDURE — 99233 SBSQ HOSP IP/OBS HIGH 50: CPT

## 2017-09-04 RX ORDER — METRONIDAZOLE 500 MG
500 TABLET ORAL EVERY 8 HOURS
Qty: 0 | Refills: 0 | Status: DISCONTINUED | OUTPATIENT
Start: 2017-09-04 | End: 2017-09-08

## 2017-09-04 RX ORDER — SODIUM CHLORIDE 9 MG/ML
1000 INJECTION INTRAMUSCULAR; INTRAVENOUS; SUBCUTANEOUS
Qty: 0 | Refills: 0 | Status: DISCONTINUED | OUTPATIENT
Start: 2017-09-04 | End: 2017-09-08

## 2017-09-04 RX ORDER — SALICYLIC ACID 0.5 %
1 CLEANSER (GRAM) TOPICAL
Qty: 0 | Refills: 0 | Status: DISCONTINUED | OUTPATIENT
Start: 2017-09-04 | End: 2017-09-14

## 2017-09-04 RX ORDER — CIPROFLOXACIN LACTATE 400MG/40ML
400 VIAL (ML) INTRAVENOUS EVERY 12 HOURS
Qty: 0 | Refills: 0 | Status: DISCONTINUED | OUTPATIENT
Start: 2017-09-05 | End: 2017-09-08

## 2017-09-04 RX ADMIN — Medication 500 MILLIGRAM(S): at 12:58

## 2017-09-04 RX ADMIN — Medication 3 MILLIGRAM(S): at 21:19

## 2017-09-04 RX ADMIN — SIMETHICONE 80 MILLIGRAM(S): 80 TABLET, CHEWABLE ORAL at 12:58

## 2017-09-04 RX ADMIN — Medication 500 MILLIGRAM(S): at 05:40

## 2017-09-04 RX ADMIN — GABAPENTIN 100 MILLIGRAM(S): 400 CAPSULE ORAL at 12:58

## 2017-09-04 RX ADMIN — Medication 100 MILLIGRAM(S): at 21:24

## 2017-09-04 RX ADMIN — ENOXAPARIN SODIUM 40 MILLIGRAM(S): 100 INJECTION SUBCUTANEOUS at 12:58

## 2017-09-04 RX ADMIN — LISINOPRIL 2.5 MILLIGRAM(S): 2.5 TABLET ORAL at 05:40

## 2017-09-04 RX ADMIN — Medication 500 MILLIGRAM(S): at 17:19

## 2017-09-04 RX ADMIN — TAMSULOSIN HYDROCHLORIDE 0.4 MILLIGRAM(S): 0.4 CAPSULE ORAL at 21:19

## 2017-09-04 RX ADMIN — Medication 1: at 08:55

## 2017-09-04 RX ADMIN — GABAPENTIN 100 MILLIGRAM(S): 400 CAPSULE ORAL at 21:19

## 2017-09-04 RX ADMIN — SODIUM CHLORIDE 75 MILLILITER(S): 9 INJECTION INTRAMUSCULAR; INTRAVENOUS; SUBCUTANEOUS at 16:30

## 2017-09-04 RX ADMIN — Medication 2: at 12:57

## 2017-09-04 RX ADMIN — GABAPENTIN 100 MILLIGRAM(S): 400 CAPSULE ORAL at 05:40

## 2017-09-04 RX ADMIN — Medication 1: at 17:18

## 2017-09-04 NOTE — PROGRESS NOTE ADULT - ASSESSMENT
62yom PMHx of DM2 (on metformin) presents as a transfer from [facility] for ischemic colitis for second opinion regarding non-surgical options available.

## 2017-09-04 NOTE — PROGRESS NOTE ADULT - ASSESSMENT
Impression:  Ischemic colitis not improving on conservative therapy. Can resume IV abx but ultimately must decide if conservative therapy should be abandoned for possible colectomy.   If has diarrhea r/o cdiff given recurrent fever and prolonged abx course.

## 2017-09-04 NOTE — CONSULT NOTE ADULT - SUBJECTIVE AND OBJECTIVE BOX
Patient is a 62y old  Male who presents with a chief complaint of ischemic colitis (28 Aug 2017 11:59)      HPI:  62yom PMHx of DM2 (on metformin) presents as a transfer from [facility] for ischemic colitis.         Pt had a colonoscopy 7/24 for evaluation for evaluation of hemorrhoids and lower abdominal/rectal discomfort. The colonoscopy ( by Gastroenterologist Dr. Wolfe)  showed sigmoid thickening and biopsies showed findings concerning for ischemic colitis. Pt was admitted to [facility] for further evaluation on 8/16. Per chart, CT A/P showed sigmoid colitis/proctitis, grossly patent mesenteric arterial vasculature. No acute mesenteric venous thrombosis seen. On 8/17: patient had a sigmoidoscopy by Dr. Leija which showed congested erythematous friable inflamed ulcerated mucosa and biopsy of that also showed ischemic colitis and erosion. 8/20 CT A/P showed worsening colitis-no fluid collection or abscess. Per chart, patient had no melena/bloody bowel movements since admission to [facility].      Pt describes his symptoms as a heavy discomfort in LLQ radiating to the lower abdominal area. He has intermittent BRBPR on diapers and toilet paper but no nausea vomiting, melena. He started having diarrhea last week while at [facility]-per notes cdiff was negative. He is having 1-2 watery brown BM a day with intermittent BRBPR. He also has been having mucous in his bowel movements. These symptoms have been going on for 3 months total.   Of note, CTA/P also showed left renal cyst vs mass and patient was recommended to follow up with Dr. Aguiar (urology) as outpatient. His course was complicated by urinary retention now started on flomax. (26 Aug 2017 21:19)      PAST MEDICAL & SURGICAL HISTORY:  Ischemic colitis  Diabetes mellitus type 2, noninsulin dependent  No significant past surgical history      REVIEW OF SYSTEMS    General:	Negative  Skin/Breast: Negative	  Ophthalmologic: Negative  ENMT: Negative  Respiratory and Thorax: Negative  Cardiovascular: Negative  Gastrointestinal: Negative  Genitourinary: Negative  Musculoskeletal: Negative  Neurological: Negative  Psychiatric: Negative  Hematology/Lymphatics: Negative  Endocrine:	Negative  Allergic/Immunologic:	 Negative      MEDICATIONS  (STANDING):  tamsulosin 0.4 milliGRAM(s) Oral at bedtime  dextrose 5%. 1000 milliLiter(s) (50 mL/Hr) IV Continuous <Continuous>  dextrose 50% Injectable 12.5 Gram(s) IV Push once  dextrose 50% Injectable 25 Gram(s) IV Push once  dextrose 50% Injectable 25 Gram(s) IV Push once  sodium biphosphate Rectal Enema 1 Enema Rectal once  insulin lispro (HumaLOG) corrective regimen sliding scale   SubCutaneous three times a day before meals  insulin lispro (HumaLOG) corrective regimen sliding scale   SubCutaneous at bedtime  gabapentin 100 milliGRAM(s) Oral three times a day  enoxaparin Injectable 40 milliGRAM(s) SubCutaneous daily  lisinopril 2.5 milliGRAM(s) Oral daily  ciprofloxacin     Tablet 500 milliGRAM(s) Oral every 12 hours  metroNIDAZOLE    Tablet 500 milliGRAM(s) Oral every 8 hours    MEDICATIONS  (PRN):  acetaminophen   Tablet 650 milliGRAM(s) Oral every 6 hours PRN For Temp greater than 38 C (100.4 F)  acetaminophen   Tablet. 650 milliGRAM(s) Oral every 6 hours PRN Mild Pain (1 - 3)  dextrose Gel 1 Dose(s) Oral once PRN Blood Glucose LESS THAN 70 milliGRAM(s)/deciliter  glucagon  Injectable 1 milliGRAM(s) IntraMuscular once PRN Glucose LESS THAN 70 milligrams/deciliter  morphine  - Injectable 2 milliGRAM(s) IV Push every 4 hours PRN Moderate Pain (4 - 6)  melatonin 3 milliGRAM(s) Oral at bedtime PRN Insomnia  simethicone 80 milliGRAM(s) Chew every 6 hours PRN Gas      Allergies    No Known Allergies    Intolerances        SOCIAL HISTORY:  Alcohol: Denied  Smoking: Nonsmoker  Drug Use: Denied  Marital Status:         FAMILY HISTORY:  Family history of diabetes mellitus in brother (Sibling)      Vital Signs Last 24 Hrs  T(C): 37.3 (04 Sep 2017 06:58), Max: 38.2 (03 Sep 2017 17:14)  T(F): 99.1 (04 Sep 2017 06:58), Max: 100.7 (03 Sep 2017 17:14)  HR: 92 (04 Sep 2017 05:39) (88 - 98)  BP: 124/77 (04 Sep 2017 05:39) (124/77 - 146/88)  BP(mean): --  RR: 17 (04 Sep 2017 05:39) (16 - 18)  SpO2: 98% (04 Sep 2017 05:39) (98% - 100%)    PHYSICAL EXAM:  Constitutional: well developed, well nourished, NAD  Eyes: anicteric  ENMT: normal facies, symmetric  Neck: supple  Respiratory: CTA bilat  Cardiovascular: RRR  Gastrointestinal: abdomen soft, nontender, nondistended. No obvious masses. No peritonitis  Extremities: FROM, warm  Neurological: intact, non-focal  Skin: no gross lesions  Lymph Nodes: no gross adenopathy  Musculoskeletal: equal strength bilateral upper and lower extremities  Psychiatric: oriented x 3; appropriate  Rectal:        LABS:                        10.4   8.40  )-----------( 449      ( 03 Sep 2017 06:21 )             30.9     09-03    136  |  98  |  4<L>  ----------------------------<  169<H>  3.7   |  24  |  0.56    Ca    8.5      03 Sep 2017 06:21  Phos  3.0     09-03  Mg     1.8     09-03            RADIOLOGY & ADDITIONAL STUDIES:

## 2017-09-04 NOTE — CONSULT NOTE ADULT - ATTENDING COMMENTS
62M w/ischemic vs. infectious colitis improving.  Febrile overnight  -increase IVF  -back diet to clear liquid
Pt seen and examined. Agree with fellow's note. Plan discussed with patient and primary team and wife and brother.     Patient had chief complaint of ischemic colitis"    Chart reviewed at 7am    AM labs significant for INR 1.4, normocytic anemia  Patient is hungry and wants to eat.  Mild left lower quadrant tenderness     Imp: Presumed ischemic colitis, need to r/o infectious/inflammatory etiologies.      Plan:   -Spoke to wife and brother at bedside. Spoke to Lane Ortega about films today. Severe rectosigmoid colitis. Also reviewed prior films from [facility] on disc with Lane Ortega from 8/21. Unchanged compared to today's study.   -Will need to review external records when they arrive.   - Avoid opiates if possible.   -We will try a trial of feeding and see how he tolerates it.  -Agree with d/c abx.  -Stool culture and c dif  -Will consider Flex sig  pending clinical course  -Empiric Vit K x 3 days  -Awaiting OSH medical records for review  -Appreciate colorectal surgery consult  -Antiplatelets per primary team  -Will need outpt GI f/u in 1month of discharge. [phone]     60 min spent with pt face/face. >50% time in care coordination for discussion of management of ischemic colitis
Pt with no previous abdominal complaints now with 3 weeks of altered bowel habits and abdominal pain. He denies bloody BMs. He was admitted to [facility] 2 weeks ago and dx with ischemic colitis. He was then started on IV antibiotics and worked up without any evidence of occlusive vascular disease.     Since admission to [facility] over the past 3 days he has reported improvement in his abd pain. He still ahs pain but it is less than previously.     WBC 19  CT with rectosigmoid thickening  flex sig with friable mucosa  Path: Colonic mucosa with focal erosion and granulation tissue.    AAOx3  Abd soft, ND, minimally tender LLQ and suprapubic. No peritonitis    A/P rectosig colitis likely ischemic without clear etiology for ischemia  IV abx  Pt is clinically improving so would give him a chance at conservative management with antibiotics. If he does not significantly improve over the next 48hrs would consider semi-urgent Brody's.   Will continue to follow

## 2017-09-04 NOTE — PROGRESS NOTE ADULT - SUBJECTIVE AND OBJECTIVE BOX
Patient is a 62y old  Male who presents with a chief complaint of ischemic colitis (28 Aug 2017 11:59)      SUBJECTIVE / OVERNIGHT EVENTS: patient seen and examined by bedside, c/o abdominal discomfort, also with low grade fevers       MEDICATIONS  (STANDING):  tamsulosin 0.4 milliGRAM(s) Oral at bedtime  dextrose 5%. 1000 milliLiter(s) (75 mL/Hr) IV Continuous <Continuous>  dextrose 50% Injectable 12.5 Gram(s) IV Push once  dextrose 50% Injectable 25 Gram(s) IV Push once  dextrose 50% Injectable 25 Gram(s) IV Push once  sodium biphosphate Rectal Enema 1 Enema Rectal once  insulin lispro (HumaLOG) corrective regimen sliding scale   SubCutaneous three times a day before meals  insulin lispro (HumaLOG) corrective regimen sliding scale   SubCutaneous at bedtime  gabapentin 100 milliGRAM(s) Oral three times a day  enoxaparin Injectable 40 milliGRAM(s) SubCutaneous daily  lisinopril 2.5 milliGRAM(s) Oral daily  ciprofloxacin     Tablet 500 milliGRAM(s) Oral every 12 hours  metroNIDAZOLE    Tablet 500 milliGRAM(s) Oral every 8 hours    MEDICATIONS  (PRN):  acetaminophen   Tablet 650 milliGRAM(s) Oral every 6 hours PRN For Temp greater than 38 C (100.4 F)  acetaminophen   Tablet. 650 milliGRAM(s) Oral every 6 hours PRN Mild Pain (1 - 3)  dextrose Gel 1 Dose(s) Oral once PRN Blood Glucose LESS THAN 70 milliGRAM(s)/deciliter  glucagon  Injectable 1 milliGRAM(s) IntraMuscular once PRN Glucose LESS THAN 70 milligrams/deciliter  morphine  - Injectable 2 milliGRAM(s) IV Push every 4 hours PRN Moderate Pain (4 - 6)  melatonin 3 milliGRAM(s) Oral at bedtime PRN Insomnia  simethicone 80 milliGRAM(s) Chew every 6 hours PRN Gas      Vital Signs Last 24 Hrs  T(C): 37.6 (04 Sep 2017 14:18), Max: 38.2 (03 Sep 2017 17:14)  T(F): 99.6 (04 Sep 2017 14:18), Max: 100.7 (03 Sep 2017 17:14)  HR: 94 (04 Sep 2017 14:18) (92 - 98)  BP: 125/80 (04 Sep 2017 14:18) (124/77 - 146/88)  BP(mean): --  RR: 18 (04 Sep 2017 14:18) (16 - 18)  SpO2: 97% (04 Sep 2017 14:18) (97% - 99%)  CAPILLARY BLOOD GLUCOSE  249 (04 Sep 2017 11:56)  178 (04 Sep 2017 08:33)  233 (03 Sep 2017 22:21)  214 (03 Sep 2017 16:48)        I&O's Summary      PHYSICAL EXAM:  GENERAL: NAD, well-developed  HEAD:  Atraumatic, Normocephalic  EYES: EOMI, PERRLA, conjunctiva and sclera clear  NECK: Supple, No JVD  CHEST/LUNG: Clear to auscultation bilaterally; No wheeze  HEART: Regular rate and rhythm; No murmurs, rubs, or gallops  ABDOMEN: Soft, mild diffuse tenderness , Nondistended; Bowel sounds present  EXTREMITIES:  , No clubbing, cyanosis, or edema  PSYCH: AAOx3  NEUROLOGY: non-focal  SKIN: No rashes or lesions      LABS:                        12.0   16.54 )-----------( 532      ( 04 Sep 2017 05:55 )             34.5     09-04    133<L>  |  94<L>  |  4<L>  ----------------------------<  184<H>  3.8   |  24  |  0.62    Ca    8.8      04 Sep 2017 05:54  Phos  3.1     09-04  Mg     1.7     09-04                RADIOLOGY & ADDITIONAL TESTS:    Imaging Personally Reviewed:    Consultant(s) Notes Reviewed:  colorectal sx, Cardiology     Care Discussed with Consultants/Other Providers:

## 2017-09-04 NOTE — PROGRESS NOTE ADULT - SUBJECTIVE AND OBJECTIVE BOX
INTERVAL HISTORY: pt seen in am comfortable, had small bowel movement, abd pain slightly improved, no chest pain noSOB, febrile overnight  	  MEDICATIONS:  tamsulosin 0.4 milliGRAM(s) Oral at bedtime  enoxaparin Injectable 40 milliGRAM(s) SubCutaneous daily  lisinopril 2.5 milliGRAM(s) Oral daily    ciprofloxacin     Tablet 500 milliGRAM(s) Oral every 12 hours  metroNIDAZOLE    Tablet 500 milliGRAM(s) Oral every 8 hours      acetaminophen   Tablet 650 milliGRAM(s) Oral every 6 hours PRN  acetaminophen   Tablet. 650 milliGRAM(s) Oral every 6 hours PRN  morphine  - Injectable 2 milliGRAM(s) IV Push every 4 hours PRN  gabapentin 100 milliGRAM(s) Oral three times a day  melatonin 3 milliGRAM(s) Oral at bedtime PRN    sodium biphosphate Rectal Enema 1 Enema Rectal once  simethicone 80 milliGRAM(s) Chew every 6 hours PRN    dextrose Gel 1 Dose(s) Oral once PRN  dextrose 50% Injectable 12.5 Gram(s) IV Push once  dextrose 50% Injectable 25 Gram(s) IV Push once  dextrose 50% Injectable 25 Gram(s) IV Push once  glucagon  Injectable 1 milliGRAM(s) IntraMuscular once PRN  insulin lispro (HumaLOG) corrective regimen sliding scale   SubCutaneous three times a day before meals  insulin lispro (HumaLOG) corrective regimen sliding scale   SubCutaneous at bedtime    dextrose 5%. 1000 milliLiter(s) IV Continuous <Continuous>      PHYSICAL EXAM:  T(C): 37.3 (09-04-17 @ 06:58), Max: 38.2 (09-03-17 @ 17:14)  HR: 92 (09-04-17 @ 05:39) (88 - 98)  BP: 124/77 (09-04-17 @ 05:39) (124/77 - 146/88)  RR: 17 (09-04-17 @ 05:39) (16 - 18)  SpO2: 98% (09-04-17 @ 05:39) (98% - 100%)  Wt(kg): --  I&O's Summary        Appearance: Normal	  HEENT:   Normal oral mucosa, PERRL, EOMI	  Cardiovascular: Normal S1 S2, No JVD, No murmurs, No edema  Respiratory: Lungs clear to auscultation	  Gastrointestinal:  slightly distended, mild diffuse pain  Extremities: Normal range of motion, No clubbing, cyanosis or edema                                    12.0   16.54 )-----------( 532      ( 04 Sep 2017 05:55 )             34.5     09-04    133<L>  |  94<L>  |  4<L>  ----------------------------<  184<H>  3.8   |  24  |  0.62    Ca    8.8      04 Sep 2017 05:54  Phos  3.1     09-04  Mg     1.7     09-04      proBNP:   Lipid Profile:   HgA1c:   TSH:

## 2017-09-04 NOTE — PROGRESS NOTE ADULT - SUBJECTIVE AND OBJECTIVE BOX
Patient is a 62y old  Male who presents with a chief complaint of ischemic colitis (28 Aug 2017 11:59)      SUBJECTIVE / OVERNIGHT EVENTS: Pt febrile to 100.7 overnight. Still w intermittent loose stool. Mostly w dyschezia.     MEDICATIONS  (STANDING):  tamsulosin 0.4 milliGRAM(s) Oral at bedtime  dextrose 5%. 1000 milliLiter(s) (75 mL/Hr) IV Continuous <Continuous>  dextrose 50% Injectable 12.5 Gram(s) IV Push once  dextrose 50% Injectable 25 Gram(s) IV Push once  dextrose 50% Injectable 25 Gram(s) IV Push once  sodium biphosphate Rectal Enema 1 Enema Rectal once  insulin lispro (HumaLOG) corrective regimen sliding scale   SubCutaneous three times a day before meals  insulin lispro (HumaLOG) corrective regimen sliding scale   SubCutaneous at bedtime  gabapentin 100 milliGRAM(s) Oral three times a day  enoxaparin Injectable 40 milliGRAM(s) SubCutaneous daily  lisinopril 2.5 milliGRAM(s) Oral daily  ciprofloxacin     Tablet 500 milliGRAM(s) Oral every 12 hours  metroNIDAZOLE    Tablet 500 milliGRAM(s) Oral every 8 hours  sodium chloride 0.9%. 1000 milliLiter(s) (75 mL/Hr) IV Continuous <Continuous>    MEDICATIONS  (PRN):  acetaminophen   Tablet 650 milliGRAM(s) Oral every 6 hours PRN For Temp greater than 38 C (100.4 F)  acetaminophen   Tablet. 650 milliGRAM(s) Oral every 6 hours PRN Mild Pain (1 - 3)  dextrose Gel 1 Dose(s) Oral once PRN Blood Glucose LESS THAN 70 milliGRAM(s)/deciliter  glucagon  Injectable 1 milliGRAM(s) IntraMuscular once PRN Glucose LESS THAN 70 milligrams/deciliter  morphine  - Injectable 2 milliGRAM(s) IV Push every 4 hours PRN Moderate Pain (4 - 6)  melatonin 3 milliGRAM(s) Oral at bedtime PRN Insomnia  simethicone 80 milliGRAM(s) Chew every 6 hours PRN Gas        CAPILLARY BLOOD GLUCOSE  175 (04 Sep 2017 16:37)  249 (04 Sep 2017 11:56)  178 (04 Sep 2017 08:33)  233 (03 Sep 2017 22:21)        I&O's Summary      ROS:  General:  fevers chills  Neuro: No headache  MSK: No back pain  Cardiac: No chest pain, palpitations  Pulmonary: No SOB or cough  GI: As per HPI  : No dysuria or hesitancy  Skin: No rash or pruritis      PHYSICAL EXAM:  GENERAL: NAD, well-developed  HEAD:  Atraumatic, Normocephalic  EYES: EOMI, PERRLA, conjunctiva and sclera anicteric  NECK: Supple, No JVD  CHEST/LUNG: Clear to auscultation bilaterally; No wheeze  HEART: Regular rate and rhythm; No murmurs, rubs, or gallops  ABDOMEN: Soft, mildly tender in lower abd, Nondistended; Bowel sounds present, no hepatosplenomegaly, no rebound or guarding  EXTREMITIES:  2+ Peripheral Pulses, No clubbing, cyanosis, or edema  PSYCH: AAOx3  NEUROLOGY: non-focal, no asterixis  SKIN: No rashes or lesions, no palmar erythema or Dupuytren's contracture, no gynecomastia  RECTAL: NO perirectal abscess or fistula. erythema of perianus.    LABS:                        12.0   16.54 )-----------( 532      ( 04 Sep 2017 05:55 )             34.5     09-04    133<L>  |  94<L>  |  4<L>  ----------------------------<  184<H>  3.8   |  24  |  0.62    Ca    8.8      04 Sep 2017 05:54  Phos  3.1     09-04  Mg     1.7     09-04                  RADIOLOGY & ADDITIONAL TESTS:

## 2017-09-04 NOTE — PROGRESS NOTE ADULT - ATTENDING COMMENTS
Hx/PE as above- pt. seen/examined. Wife at bedside. Continue to c/o LLQ and lower abdominal pain and is having approximately 4 soft BM's with mucus. Has been dx with a segmental colitis in the sigmoid/DC region with previous bx c/w ischemic colitis. Low grade fever and increased WBC is noted. Current sx's noted for past approximate 2 months. Pt. is comfortable, NAD/non-toxic appearing. Abd.- soft, ND with mild tenderness and fullness at LLQ (?palpable sigmoid). Labs as above.    IMP: segmental colitis- persistant pain. Prior w/u c/w ischemic colitis. Non-healing colitis- clinical picture or chronic ulcerating colitis due to non-healing ischemic colitis is noted.     REC:  -Surgery f/u regarding potential need for resection and operative therapy.  -LRD.  -Enteral nutritional supplement.  -Prealbumin.  -F/U CBC, LFT's  -Pan culture.  -Stool or C. diff.

## 2017-09-04 NOTE — PROGRESS NOTE ADULT - ASSESSMENT
EKG - not in chart     a/p     1) Ischemic colitis - being treated medically, CT scan shows severe severe recto sigmoid colitis, on flagyl and cipro, seen by colorectal surgery, recommend IV fluids and clear liquid diet for now    2) Abdominal pain - 12 lead EKG shows normal sinus rhythm no STT changes, no chest pain no SOB, if no surgery here then out pt echo and stress test     3) HTN - on lisinopril     4) DM2 - on insulin

## 2017-09-04 NOTE — PROGRESS NOTE ADULT - PROBLEM SELECTOR PLAN 2
worsened, pt aslo with low grade fever  CXR negative  will f/u blood and urine cx worsened, pt aslo with low grade fever  CXR negative  will f/u blood and urine cx   gentle IV hydration

## 2017-09-04 NOTE — CHART NOTE - NSCHARTNOTEFT_GEN_A_CORE
Called Urology cs for L renal mass as noted on CT abd/pelvis on 9/28.  As per Dr. Munoz, patient should have outpatient work up at the [facility] with Dr. Ann [phone].  Will provide patient with this information on d/c paperwork for f/u.

## 2017-09-04 NOTE — CHART NOTE - NSCHARTNOTEFT_GEN_A_CORE
Patient still with abdominal discomfort.  Diet de-escalated to clears.  WBC increased today to 16.54.  GI called to re-evaluate patient.  Will await recs.

## 2017-09-05 DIAGNOSIS — D47.3 ESSENTIAL (HEMORRHAGIC) THROMBOCYTHEMIA: ICD-10-CM

## 2017-09-05 PROBLEM — Z00.00 ENCOUNTER FOR PREVENTIVE HEALTH EXAMINATION: Status: ACTIVE | Noted: 2017-09-05

## 2017-09-05 LAB
BACTERIA UR CULT: SIGNIFICANT CHANGE UP
BUN SERPL-MCNC: 5 MG/DL — LOW (ref 7–23)
CALCIUM SERPL-MCNC: 8.2 MG/DL — LOW (ref 8.4–10.5)
CHLORIDE SERPL-SCNC: 98 MMOL/L — SIGNIFICANT CHANGE UP (ref 98–107)
CO2 SERPL-SCNC: 22 MMOL/L — SIGNIFICANT CHANGE UP (ref 22–31)
CREAT SERPL-MCNC: 0.64 MG/DL — SIGNIFICANT CHANGE UP (ref 0.5–1.3)
GLUCOSE SERPL-MCNC: 158 MG/DL — HIGH (ref 70–99)
HCT VFR BLD CALC: 32.2 % — LOW (ref 39–50)
HGB BLD-MCNC: 10.8 G/DL — LOW (ref 13–17)
MAGNESIUM SERPL-MCNC: 1.8 MG/DL — SIGNIFICANT CHANGE UP (ref 1.6–2.6)
MCHC RBC-ENTMCNC: 27.9 PG — SIGNIFICANT CHANGE UP (ref 27–34)
MCHC RBC-ENTMCNC: 33.5 % — SIGNIFICANT CHANGE UP (ref 32–36)
MCV RBC AUTO: 83.2 FL — SIGNIFICANT CHANGE UP (ref 80–100)
NRBC # FLD: 0 — SIGNIFICANT CHANGE UP
PHOSPHATE SERPL-MCNC: 3.3 MG/DL — SIGNIFICANT CHANGE UP (ref 2.5–4.5)
PLATELET # BLD AUTO: 533 K/UL — HIGH (ref 150–400)
PMV BLD: 8.7 FL — SIGNIFICANT CHANGE UP (ref 7–13)
POTASSIUM SERPL-MCNC: 3.7 MMOL/L — SIGNIFICANT CHANGE UP (ref 3.5–5.3)
POTASSIUM SERPL-SCNC: 3.7 MMOL/L — SIGNIFICANT CHANGE UP (ref 3.5–5.3)
PREALB SERPL-MCNC: 8 MG/DL — LOW (ref 20–40)
RBC # BLD: 3.87 M/UL — LOW (ref 4.2–5.8)
RBC # FLD: 14.2 % — SIGNIFICANT CHANGE UP (ref 10.3–14.5)
SODIUM SERPL-SCNC: 137 MMOL/L — SIGNIFICANT CHANGE UP (ref 135–145)
SPECIMEN SOURCE: SIGNIFICANT CHANGE UP
WBC # BLD: 12.65 K/UL — HIGH (ref 3.8–10.5)
WBC # FLD AUTO: 12.65 K/UL — HIGH (ref 3.8–10.5)

## 2017-09-05 PROCEDURE — 99232 SBSQ HOSP IP/OBS MODERATE 35: CPT

## 2017-09-05 PROCEDURE — 99232 SBSQ HOSP IP/OBS MODERATE 35: CPT | Mod: GC

## 2017-09-05 PROCEDURE — 99233 SBSQ HOSP IP/OBS HIGH 50: CPT

## 2017-09-05 RX ADMIN — GABAPENTIN 100 MILLIGRAM(S): 400 CAPSULE ORAL at 13:29

## 2017-09-05 RX ADMIN — LISINOPRIL 2.5 MILLIGRAM(S): 2.5 TABLET ORAL at 05:48

## 2017-09-05 RX ADMIN — Medication 200 MILLIGRAM(S): at 18:11

## 2017-09-05 RX ADMIN — Medication 1: at 18:11

## 2017-09-05 RX ADMIN — GABAPENTIN 100 MILLIGRAM(S): 400 CAPSULE ORAL at 05:48

## 2017-09-05 RX ADMIN — TAMSULOSIN HYDROCHLORIDE 0.4 MILLIGRAM(S): 0.4 CAPSULE ORAL at 22:59

## 2017-09-05 RX ADMIN — GABAPENTIN 100 MILLIGRAM(S): 400 CAPSULE ORAL at 22:59

## 2017-09-05 RX ADMIN — Medication 1: at 13:28

## 2017-09-05 RX ADMIN — Medication 200 MILLIGRAM(S): at 05:53

## 2017-09-05 RX ADMIN — ENOXAPARIN SODIUM 40 MILLIGRAM(S): 100 INJECTION SUBCUTANEOUS at 13:28

## 2017-09-05 RX ADMIN — Medication 100 MILLIGRAM(S): at 22:59

## 2017-09-05 RX ADMIN — Medication 100 MILLIGRAM(S): at 05:53

## 2017-09-05 RX ADMIN — Medication 2: at 08:33

## 2017-09-05 RX ADMIN — Medication 100 MILLIGRAM(S): at 13:40

## 2017-09-05 RX ADMIN — SODIUM CHLORIDE 75 MILLILITER(S): 9 INJECTION INTRAMUSCULAR; INTRAVENOUS; SUBCUTANEOUS at 05:46

## 2017-09-05 NOTE — PROGRESS NOTE ADULT - SUBJECTIVE AND OBJECTIVE BOX
B TEAM PROGRESS NOTE    Doing well. Had some abdominal pain in the AM but now asymptomatic. Passing stool (small, hard) and gas.    Vital Signs Last 24 Hrs  T(C): 36.2 (05 Sep 2017 13:26), Max: 37.6 (04 Sep 2017 20:29)  T(F): 97.1 (05 Sep 2017 13:26), Max: 99.6 (04 Sep 2017 20:29)  HR: 88 (05 Sep 2017 13:26) (88 - 95)  BP: 121/81 (05 Sep 2017 13:26) (102/60 - 121/81)  BP(mean): --  RR: 18 (05 Sep 2017 13:26) (18 - 18)  SpO2: 98% (05 Sep 2017 13:26) (95% - 98%)                        10.8   12.65 )-----------( 533      ( 05 Sep 2017 05:15 )             32.2     09-05    137  |  98  |  5<L>  ----------------------------<  158<H>  3.7   |  22  |  0.64    Ca    8.2<L>      05 Sep 2017 05:15  Phos  3.3     09-05  Mg     1.8     09-05    Gen: NAD  Abd: Soft, distended, nontender. No guarding/rebound.

## 2017-09-05 NOTE — PROGRESS NOTE ADULT - ASSESSMENT
62M w/ischemic vs. infectious colitis. Improving.  - Advance diet as tolerated  - No acute surgical intervention at this time  - Continue abx  - D/c IVF once tolerating reg diet  - D/w Dr. Boo/Haley [facility] team surgery  [id number]

## 2017-09-05 NOTE — PROGRESS NOTE ADULT - ATTENDING COMMENTS
Pt still with daily pain/pressure in lower abdomen. Diet was moved back to clears. Afeb x 24hours but had low grade temp night before last.     AAOx3  abd soft, minimally tender BLQ    A/P Ischemic colitis not improving with conservative mgmt  The patient seemed to improve but then regressed. Still having pain. I believe that he was given an adequate chance at conservative management but doesn't seem to be improving. I discussed with him doing a Brody's procedure  - no anastomosis as I would not bowel prep him in the setting of active ischemia. The R/B/A of the procedure were d/w the pt including but not limited to bleeding, infection, pain, ileus, cardiac./pulm/renal complications. He understands and we will plan for the OR in the coming days.    Nutritional supplements TID  Ostomy Rn for marking

## 2017-09-05 NOTE — PROGRESS NOTE ADULT - ATTENDING COMMENTS
Patient seen and examined, agree with the above assessment and plan by NOHEMI Mistry.  Cv stable  cont current mgmt

## 2017-09-05 NOTE — PROGRESS NOTE ADULT - SUBJECTIVE AND OBJECTIVE BOX
Patient is a 62y old  Male who presents with a chief complaint of ischemic colitis (28 Aug 2017 11:59)      SUBJECTIVE / OVERNIGHT EVENTS:  intermittent pressure sensation in the abd, tolerating clear diet     MEDICATIONS  (STANDING):  tamsulosin 0.4 milliGRAM(s) Oral at bedtime  dextrose 5%. 1000 milliLiter(s) (75 mL/Hr) IV Continuous <Continuous>  dextrose 50% Injectable 12.5 Gram(s) IV Push once  dextrose 50% Injectable 25 Gram(s) IV Push once  dextrose 50% Injectable 25 Gram(s) IV Push once  sodium biphosphate Rectal Enema 1 Enema Rectal once  insulin lispro (HumaLOG) corrective regimen sliding scale   SubCutaneous three times a day before meals  insulin lispro (HumaLOG) corrective regimen sliding scale   SubCutaneous at bedtime  gabapentin 100 milliGRAM(s) Oral three times a day  enoxaparin Injectable 40 milliGRAM(s) SubCutaneous daily  lisinopril 2.5 milliGRAM(s) Oral daily  sodium chloride 0.9%. 1000 milliLiter(s) (75 mL/Hr) IV Continuous <Continuous>  ciprofloxacin   IVPB 400 milliGRAM(s) IV Intermittent every 12 hours  metroNIDAZOLE  IVPB 500 milliGRAM(s) IV Intermittent every 8 hours    MEDICATIONS  (PRN):  acetaminophen   Tablet 650 milliGRAM(s) Oral every 6 hours PRN For Temp greater than 38 C (100.4 F)  acetaminophen   Tablet. 650 milliGRAM(s) Oral every 6 hours PRN Mild Pain (1 - 3)  dextrose Gel 1 Dose(s) Oral once PRN Blood Glucose LESS THAN 70 milliGRAM(s)/deciliter  glucagon  Injectable 1 milliGRAM(s) IntraMuscular once PRN Glucose LESS THAN 70 milligrams/deciliter  morphine  - Injectable 2 milliGRAM(s) IV Push every 4 hours PRN Moderate Pain (4 - 6)  melatonin 3 milliGRAM(s) Oral at bedtime PRN Insomnia  simethicone 80 milliGRAM(s) Chew every 6 hours PRN Gas  vitamin A &amp; D Ointment 1 Application(s) Topical every 3 hours PRN skin irritation      T(C): 36.2 (09-05-17 @ 13:26), Max: 37.6 (09-04-17 @ 20:29)  HR: 88 (09-05-17 @ 13:26) (88 - 95)  BP: 121/81 (09-05-17 @ 13:26) (102/60 - 121/81)  RR: 18 (09-05-17 @ 13:26) (18 - 18)  SpO2: 98% (09-05-17 @ 13:26) (95% - 98%)  CAPILLARY BLOOD GLUCOSE  170 (05 Sep 2017 12:07)  215 (05 Sep 2017 08:28)  154 (04 Sep 2017 21:51)  175 (04 Sep 2017 16:37)        I&O's Summary      PHYSICAL EXAM:  GENERAL: NAD, well-developed  HEAD:  Atraumatic, Normocephalic  EYES: EOMI, PERRLA, conjunctiva and sclera clear  NECK: Supple, No JVD  CHEST/LUNG: Clear to auscultation bilaterally; No wheeze  HEART: s1 s2, regular rhythm and rate   ABDOMEN: Soft, Nontender, Nondistended; Bowel sounds present  EXTREMITIES:  2+ Peripheral Pulses, No clubbing, cyanosis, or edema  PSYCH: AAOx3, calm   NEUROLOGY: non-focal  SKIN: No rashes or lesions    LABS:                        10.8   12.65 )-----------( 533      ( 05 Sep 2017 05:15 )             32.2     09-05    137  |  98  |  5<L>  ----------------------------<  158<H>  3.7   |  22  |  0.64    Ca    8.2<L>      05 Sep 2017 05:15  Phos  3.3     09-05  Mg     1.8     09-05                RADIOLOGY & ADDITIONAL TESTS:    Imaging Personally Reviewed:    Consultant(s) Notes Reviewed:      Care Discussed with Consultants/Other Providers:

## 2017-09-05 NOTE — PROGRESS NOTE ADULT - ASSESSMENT
62yom PMHx of DM2 (on metformin) presents as a transfer from [facility] for ischemic colitis for second opinion regarding non-surgical options available.

## 2017-09-05 NOTE — PROGRESS NOTE ADULT - ASSESSMENT
62yom PMHx of DM2 (on Metformin) presents as a transfer from [facility] for ischemic colitis.         1) Ischemic colitis - being treated medically, CT scan shows severe recto sigmoid colitis, on flagyl and cipro, seen by colorectal surgery, recommend IV fluids and clear liquid diet for now    2) Abdominal pain - 12 lead EKG shows normal sinus rhythm no STT changes, no chest pain no SOB, if no surgery here then out pt echo and stress test     3) HTN - on lisinopril     4) DM2 - on insulin

## 2017-09-05 NOTE — PROGRESS NOTE ADULT - SUBJECTIVE AND OBJECTIVE BOX
CARDIOLOGY FOLLOW UP - Dr. Wiley    CC no chest pain or sob       PHYSICAL EXAM:  T(C): 36.2 (09-05-17 @ 13:26), Max: 37.6 (09-04-17 @ 14:18)  HR: 88 (09-05-17 @ 13:26) (88 - 95)  BP: 121/81 (09-05-17 @ 13:26) (102/60 - 125/80)  RR: 18 (09-05-17 @ 13:26) (18 - 18)  SpO2: 98% (09-05-17 @ 13:26) (95% - 98%)  Wt(kg): --  I&O's Summary      Appearance: Normal	  Cardiovascular: Normal S1 S2,RRR, No JVD, No murmurs  Respiratory: Lungs clear to auscultation	  Gastrointestinal:  Soft, Non-tender, + BS	  Extremities: Normal range of motion, No clubbing, cyanosis or edema        MEDICATIONS  (STANDING):  tamsulosin 0.4 milliGRAM(s) Oral at bedtime  dextrose 5%. 1000 milliLiter(s) (75 mL/Hr) IV Continuous <Continuous>  dextrose 50% Injectable 12.5 Gram(s) IV Push once  dextrose 50% Injectable 25 Gram(s) IV Push once  dextrose 50% Injectable 25 Gram(s) IV Push once  sodium biphosphate Rectal Enema 1 Enema Rectal once  insulin lispro (HumaLOG) corrective regimen sliding scale   SubCutaneous three times a day before meals  insulin lispro (HumaLOG) corrective regimen sliding scale   SubCutaneous at bedtime  gabapentin 100 milliGRAM(s) Oral three times a day  enoxaparin Injectable 40 milliGRAM(s) SubCutaneous daily  lisinopril 2.5 milliGRAM(s) Oral daily  sodium chloride 0.9%. 1000 milliLiter(s) (75 mL/Hr) IV Continuous <Continuous>  ciprofloxacin   IVPB 400 milliGRAM(s) IV Intermittent every 12 hours  metroNIDAZOLE  IVPB 500 milliGRAM(s) IV Intermittent every 8 hours      OTHER: 	    LABS:	 	                                10.8   12.65 )-----------( 533      ( 05 Sep 2017 05:15 )             32.2     09-05    137  |  98  |  5<L>  ----------------------------<  158<H>  3.7   |  22  |  0.64    Ca    8.2<L>      05 Sep 2017 05:15  Phos  3.3     09-05  Mg     1.8     09-05

## 2017-09-06 LAB
BUN SERPL-MCNC: 5 MG/DL — LOW (ref 7–23)
CALCIUM SERPL-MCNC: 8.8 MG/DL — SIGNIFICANT CHANGE UP (ref 8.4–10.5)
CHLORIDE SERPL-SCNC: 96 MMOL/L — LOW (ref 98–107)
CO2 SERPL-SCNC: 20 MMOL/L — LOW (ref 22–31)
CREAT SERPL-MCNC: 0.71 MG/DL — SIGNIFICANT CHANGE UP (ref 0.5–1.3)
GLUCOSE SERPL-MCNC: 189 MG/DL — HIGH (ref 70–99)
HCT VFR BLD CALC: 32.5 % — LOW (ref 39–50)
HGB BLD-MCNC: 11.1 G/DL — LOW (ref 13–17)
LACTATE SERPL-SCNC: 1.3 MMOL/L — SIGNIFICANT CHANGE UP (ref 0.5–2)
MAGNESIUM SERPL-MCNC: 1.8 MG/DL — SIGNIFICANT CHANGE UP (ref 1.6–2.6)
MCHC RBC-ENTMCNC: 28.2 PG — SIGNIFICANT CHANGE UP (ref 27–34)
MCHC RBC-ENTMCNC: 34.2 % — SIGNIFICANT CHANGE UP (ref 32–36)
MCV RBC AUTO: 82.7 FL — SIGNIFICANT CHANGE UP (ref 80–100)
NRBC # FLD: 0 — SIGNIFICANT CHANGE UP
PHOSPHATE SERPL-MCNC: 2.8 MG/DL — SIGNIFICANT CHANGE UP (ref 2.5–4.5)
PLATELET # BLD AUTO: 563 K/UL — HIGH (ref 150–400)
PMV BLD: 8.7 FL — SIGNIFICANT CHANGE UP (ref 7–13)
POTASSIUM SERPL-MCNC: 3.7 MMOL/L — SIGNIFICANT CHANGE UP (ref 3.5–5.3)
POTASSIUM SERPL-SCNC: 3.7 MMOL/L — SIGNIFICANT CHANGE UP (ref 3.5–5.3)
RBC # BLD: 3.93 M/UL — LOW (ref 4.2–5.8)
RBC # FLD: 14 % — SIGNIFICANT CHANGE UP (ref 10.3–14.5)
SODIUM SERPL-SCNC: 134 MMOL/L — LOW (ref 135–145)
WBC # BLD: 12.8 K/UL — HIGH (ref 3.8–10.5)
WBC # FLD AUTO: 12.8 K/UL — HIGH (ref 3.8–10.5)

## 2017-09-06 PROCEDURE — 93010 ELECTROCARDIOGRAM REPORT: CPT

## 2017-09-06 PROCEDURE — 99232 SBSQ HOSP IP/OBS MODERATE 35: CPT

## 2017-09-06 RX ADMIN — GABAPENTIN 100 MILLIGRAM(S): 400 CAPSULE ORAL at 05:52

## 2017-09-06 RX ADMIN — GABAPENTIN 100 MILLIGRAM(S): 400 CAPSULE ORAL at 13:26

## 2017-09-06 RX ADMIN — Medication 3 MILLIGRAM(S): at 21:03

## 2017-09-06 RX ADMIN — TAMSULOSIN HYDROCHLORIDE 0.4 MILLIGRAM(S): 0.4 CAPSULE ORAL at 21:03

## 2017-09-06 RX ADMIN — MORPHINE SULFATE 2 MILLIGRAM(S): 50 CAPSULE, EXTENDED RELEASE ORAL at 21:15

## 2017-09-06 RX ADMIN — Medication 100 MILLIGRAM(S): at 13:26

## 2017-09-06 RX ADMIN — Medication 200 MILLIGRAM(S): at 18:06

## 2017-09-06 RX ADMIN — ENOXAPARIN SODIUM 40 MILLIGRAM(S): 100 INJECTION SUBCUTANEOUS at 13:24

## 2017-09-06 RX ADMIN — MORPHINE SULFATE 2 MILLIGRAM(S): 50 CAPSULE, EXTENDED RELEASE ORAL at 21:02

## 2017-09-06 RX ADMIN — Medication 2: at 08:55

## 2017-09-06 RX ADMIN — LISINOPRIL 2.5 MILLIGRAM(S): 2.5 TABLET ORAL at 05:52

## 2017-09-06 RX ADMIN — MORPHINE SULFATE 2 MILLIGRAM(S): 50 CAPSULE, EXTENDED RELEASE ORAL at 05:51

## 2017-09-06 RX ADMIN — Medication 1: at 13:24

## 2017-09-06 RX ADMIN — Medication 200 MILLIGRAM(S): at 05:52

## 2017-09-06 RX ADMIN — Medication 100 MILLIGRAM(S): at 22:04

## 2017-09-06 RX ADMIN — GABAPENTIN 100 MILLIGRAM(S): 400 CAPSULE ORAL at 21:03

## 2017-09-06 RX ADMIN — MORPHINE SULFATE 2 MILLIGRAM(S): 50 CAPSULE, EXTENDED RELEASE ORAL at 06:19

## 2017-09-06 RX ADMIN — Medication 100 MILLIGRAM(S): at 05:51

## 2017-09-06 NOTE — PROGRESS NOTE ADULT - SUBJECTIVE AND OBJECTIVE BOX
Patient is a 62y old  Male who presents with a chief complaint of ischemic colitis (28 Aug 2017 11:59)      SUBJECTIVE / OVERNIGHT EVENTS:  intermittent abd pain. no diarrhea    MEDICATIONS  (STANDING):  tamsulosin 0.4 milliGRAM(s) Oral at bedtime  dextrose 5%. 1000 milliLiter(s) (75 mL/Hr) IV Continuous <Continuous>  dextrose 50% Injectable 12.5 Gram(s) IV Push once  dextrose 50% Injectable 25 Gram(s) IV Push once  dextrose 50% Injectable 25 Gram(s) IV Push once  sodium biphosphate Rectal Enema 1 Enema Rectal once  insulin lispro (HumaLOG) corrective regimen sliding scale   SubCutaneous three times a day before meals  insulin lispro (HumaLOG) corrective regimen sliding scale   SubCutaneous at bedtime  gabapentin 100 milliGRAM(s) Oral three times a day  enoxaparin Injectable 40 milliGRAM(s) SubCutaneous daily  lisinopril 2.5 milliGRAM(s) Oral daily  sodium chloride 0.9%. 1000 milliLiter(s) (75 mL/Hr) IV Continuous <Continuous>  ciprofloxacin   IVPB 400 milliGRAM(s) IV Intermittent every 12 hours  metroNIDAZOLE  IVPB 500 milliGRAM(s) IV Intermittent every 8 hours    MEDICATIONS  (PRN):  acetaminophen   Tablet 650 milliGRAM(s) Oral every 6 hours PRN For Temp greater than 38 C (100.4 F)  acetaminophen   Tablet. 650 milliGRAM(s) Oral every 6 hours PRN Mild Pain (1 - 3)  dextrose Gel 1 Dose(s) Oral once PRN Blood Glucose LESS THAN 70 milliGRAM(s)/deciliter  glucagon  Injectable 1 milliGRAM(s) IntraMuscular once PRN Glucose LESS THAN 70 milligrams/deciliter  morphine  - Injectable 2 milliGRAM(s) IV Push every 4 hours PRN Moderate Pain (4 - 6)  melatonin 3 milliGRAM(s) Oral at bedtime PRN Insomnia  simethicone 80 milliGRAM(s) Chew every 6 hours PRN Gas  vitamin A &amp; D Ointment 1 Application(s) Topical every 3 hours PRN skin irritation      T(C): 37 (09-06-17 @ 14:01), Max: 37.2 (09-05-17 @ 23:57)  HR: 99 (09-06-17 @ 14:01) (91 - 99)  BP: 108/66 (09-06-17 @ 14:01) (108/66 - 128/81)  RR: 17 (09-06-17 @ 14:01) (17 - 17)  SpO2: 99% (09-06-17 @ 14:01) (98% - 99%)  CAPILLARY BLOOD GLUCOSE  200 (06 Sep 2017 11:53)  216 (06 Sep 2017 08:27)  137 (05 Sep 2017 22:35)  180 (05 Sep 2017 16:48)        I&O's Summary    05 Sep 2017 07:01  -  06 Sep 2017 07:00  --------------------------------------------------------  IN: 300 mL / OUT: 400 mL / NET: -100 mL        PHYSICAL EXAM:  GENERAL: NAD, well-developed  HEAD:  Atraumatic, Normocephalic  EYES: EOMI, PERRLA, conjunctiva and sclera clear  NECK: Supple, No JVD  CHEST/LUNG: Clear to auscultation bilaterally; No wheeze  HEART: s1 s2, regular rhythm and rate   ABDOMEN: Soft, mild TTP, Nondistended; Bowel sounds present  EXTREMITIES:  2+ Peripheral Pulses, No clubbing, cyanosis, or edema  PSYCH: AAOx3, calm   NEUROLOGY: non-focal  SKIN: No rashes or lesions    LABS:                        11.1   12.80 )-----------( 563      ( 06 Sep 2017 05:40 )             32.5     09-06    134<L>  |  96<L>  |  5<L>  ----------------------------<  189<H>  3.7   |  20<L>  |  0.71    Ca    8.8      06 Sep 2017 05:40  Phos  2.8     09-06  Mg     1.8     09-06                RADIOLOGY & ADDITIONAL TESTS:    Imaging Personally Reviewed:    Consultant(s) Notes Reviewed:      Care Discussed with Consultants/Other Providers:

## 2017-09-06 NOTE — PROGRESS NOTE ADULT - ASSESSMENT
62yom PMHx of DM2 (on metformin) presents as a transfer from [facility] for ischemic colitis for second opinion regarding non-surgical options available.

## 2017-09-06 NOTE — PROGRESS NOTE ADULT - SUBJECTIVE AND OBJECTIVE BOX
S: Patient had pain with passing stool and mucus this am.  He had relief with Morphine and passed soft stool with Pain is well controlled and patient with no present complaints of pain..    O: Vital Signs Last 24 Hrs  T(C): 36.7 (06 Sep 2017 05:45), Max: 37.2 (05 Sep 2017 23:57)  T(F): 98.1 (06 Sep 2017 05:45), Max: 99 (05 Sep 2017 23:57)  HR: 94 (06 Sep 2017 05:45) (88 - 94)  BP: 128/81 (06 Sep 2017 05:45) (121/81 - 128/81)  BP(mean): --  RR: 17 (06 Sep 2017 05:45) (17 - 18)  SpO2: 98% (06 Sep 2017 05:45) (98% - 98%)    I&O's Detail    05 Sep 2017 07:01  -  06 Sep 2017 06:50  --------------------------------------------------------  IN:    IV PiggyBack: 300 mL  Total IN: 300 mL    OUT:    Voided: 400 mL  Total OUT: 400 mL    Total NET: -100 mL             Gen: NAD  Abd: Soft, distended, nontender. No guarding/rebound.                           11.1   12.80 )-----------( 563      ( 06 Sep 2017 05:40 )             32.5   09-05    137  |  98  |  5<L>  ----------------------------<  158<H>  3.7   |  22  |  0.64    Ca    8.2<L>      05 Sep 2017 05:15  Phos  3.3     09-05  Mg     1.8     09-05

## 2017-09-06 NOTE — PROGRESS NOTE ADULT - ASSESSMENT
A/P Ischemic colitis not improving with conservative mgmt  -Nutritional supplements TID  -Ostomy Rn for marking.   -Preop optimization: full labs, type and scree, CXR, EKG (if not recent)  -Please document risk assessment  -NPO p MN on Thursday   -OR for Brody Procedure on Friday 9/7/17  -Will consent for Sx    C Alec WREN

## 2017-09-07 LAB
APPEARANCE UR: CLEAR — SIGNIFICANT CHANGE UP
APTT BLD: 28.6 SEC — SIGNIFICANT CHANGE UP (ref 27.5–37.4)
BACTERIA # UR AUTO: SIGNIFICANT CHANGE UP
BASOPHILS # BLD AUTO: 0.01 K/UL — SIGNIFICANT CHANGE UP (ref 0–0.2)
BASOPHILS NFR BLD AUTO: 0.1 % — SIGNIFICANT CHANGE UP (ref 0–2)
BILIRUB UR-MCNC: NEGATIVE — SIGNIFICANT CHANGE UP
BLD GP AB SCN SERPL QL: NEGATIVE — SIGNIFICANT CHANGE UP
BLOOD UR QL VISUAL: NEGATIVE — SIGNIFICANT CHANGE UP
BUN SERPL-MCNC: 7 MG/DL — SIGNIFICANT CHANGE UP (ref 7–23)
CALCIUM SERPL-MCNC: 8.4 MG/DL — SIGNIFICANT CHANGE UP (ref 8.4–10.5)
CHLORIDE SERPL-SCNC: 98 MMOL/L — SIGNIFICANT CHANGE UP (ref 98–107)
CO2 SERPL-SCNC: 23 MMOL/L — SIGNIFICANT CHANGE UP (ref 22–31)
COLOR SPEC: YELLOW — SIGNIFICANT CHANGE UP
CREAT SERPL-MCNC: 0.67 MG/DL — SIGNIFICANT CHANGE UP (ref 0.5–1.3)
EOSINOPHIL # BLD AUTO: 0.2 K/UL — SIGNIFICANT CHANGE UP (ref 0–0.5)
EOSINOPHIL NFR BLD AUTO: 1.7 % — SIGNIFICANT CHANGE UP (ref 0–6)
GLUCOSE SERPL-MCNC: 245 MG/DL — HIGH (ref 70–99)
GLUCOSE UR-MCNC: NEGATIVE — SIGNIFICANT CHANGE UP
HCT VFR BLD CALC: 31.2 % — LOW (ref 39–50)
HGB BLD-MCNC: 10.3 G/DL — LOW (ref 13–17)
HYALINE CASTS # UR AUTO: SIGNIFICANT CHANGE UP (ref 0–?)
IMM GRANULOCYTES # BLD AUTO: 0.11 # — SIGNIFICANT CHANGE UP
IMM GRANULOCYTES NFR BLD AUTO: 0.9 % — SIGNIFICANT CHANGE UP (ref 0–1.5)
INR BLD: 1.49 — HIGH (ref 0.88–1.17)
KETONES UR-MCNC: NEGATIVE — SIGNIFICANT CHANGE UP
LEUKOCYTE ESTERASE UR-ACNC: SIGNIFICANT CHANGE UP
LYMPHOCYTES # BLD AUTO: 1.08 K/UL — SIGNIFICANT CHANGE UP (ref 1–3.3)
LYMPHOCYTES # BLD AUTO: 9.1 % — LOW (ref 13–44)
MCHC RBC-ENTMCNC: 27.6 PG — SIGNIFICANT CHANGE UP (ref 27–34)
MCHC RBC-ENTMCNC: 33 % — SIGNIFICANT CHANGE UP (ref 32–36)
MCV RBC AUTO: 83.6 FL — SIGNIFICANT CHANGE UP (ref 80–100)
MONOCYTES # BLD AUTO: 1.17 K/UL — HIGH (ref 0–0.9)
MONOCYTES NFR BLD AUTO: 9.8 % — SIGNIFICANT CHANGE UP (ref 2–14)
MUCOUS THREADS # UR AUTO: SIGNIFICANT CHANGE UP
NEUTROPHILS # BLD AUTO: 9.31 K/UL — HIGH (ref 1.8–7.4)
NEUTROPHILS NFR BLD AUTO: 78.4 % — HIGH (ref 43–77)
NITRITE UR-MCNC: NEGATIVE — SIGNIFICANT CHANGE UP
NRBC # FLD: 0 — SIGNIFICANT CHANGE UP
PH UR: 6.5 — SIGNIFICANT CHANGE UP (ref 4.6–8)
PLATELET # BLD AUTO: 586 K/UL — HIGH (ref 150–400)
PMV BLD: 8.7 FL — SIGNIFICANT CHANGE UP (ref 7–13)
POTASSIUM SERPL-MCNC: 3.8 MMOL/L — SIGNIFICANT CHANGE UP (ref 3.5–5.3)
POTASSIUM SERPL-SCNC: 3.8 MMOL/L — SIGNIFICANT CHANGE UP (ref 3.5–5.3)
PROT UR-MCNC: NEGATIVE — SIGNIFICANT CHANGE UP
PROTHROM AB SERPL-ACNC: 16.8 SEC — HIGH (ref 9.8–13.1)
RBC # BLD: 3.73 M/UL — LOW (ref 4.2–5.8)
RBC # FLD: 14.1 % — SIGNIFICANT CHANGE UP (ref 10.3–14.5)
RBC CASTS # UR COMP ASSIST: SIGNIFICANT CHANGE UP (ref 0–?)
RH IG SCN BLD-IMP: POSITIVE — SIGNIFICANT CHANGE UP
SODIUM SERPL-SCNC: 136 MMOL/L — SIGNIFICANT CHANGE UP (ref 135–145)
SP GR SPEC: 1.01 — SIGNIFICANT CHANGE UP (ref 1–1.03)
SQUAMOUS # UR AUTO: SIGNIFICANT CHANGE UP
UROBILINOGEN FLD QL: NORMAL E.U. — SIGNIFICANT CHANGE UP (ref 0.1–0.2)
WBC # BLD: 11.88 K/UL — HIGH (ref 3.8–10.5)
WBC # FLD AUTO: 11.88 K/UL — HIGH (ref 3.8–10.5)
WBC UR QL: SIGNIFICANT CHANGE UP (ref 0–?)

## 2017-09-07 PROCEDURE — 99232 SBSQ HOSP IP/OBS MODERATE 35: CPT

## 2017-09-07 PROCEDURE — 99233 SBSQ HOSP IP/OBS HIGH 50: CPT | Mod: 57

## 2017-09-07 RX ORDER — SODIUM CHLORIDE 9 MG/ML
1000 INJECTION, SOLUTION INTRAVENOUS
Qty: 0 | Refills: 0 | Status: DISCONTINUED | OUTPATIENT
Start: 2017-09-07 | End: 2017-09-08

## 2017-09-07 RX ORDER — MORPHINE SULFATE 50 MG/1
2 CAPSULE, EXTENDED RELEASE ORAL EVERY 4 HOURS
Qty: 0 | Refills: 0 | Status: DISCONTINUED | OUTPATIENT
Start: 2017-09-07 | End: 2017-09-08

## 2017-09-07 RX ADMIN — Medication 100 MILLIGRAM(S): at 05:10

## 2017-09-07 RX ADMIN — GABAPENTIN 100 MILLIGRAM(S): 400 CAPSULE ORAL at 21:56

## 2017-09-07 RX ADMIN — Medication 200 MILLIGRAM(S): at 05:10

## 2017-09-07 RX ADMIN — Medication 3 MILLIGRAM(S): at 22:46

## 2017-09-07 RX ADMIN — Medication 200 MILLIGRAM(S): at 18:02

## 2017-09-07 RX ADMIN — Medication 100 MILLIGRAM(S): at 13:10

## 2017-09-07 RX ADMIN — MORPHINE SULFATE 2 MILLIGRAM(S): 50 CAPSULE, EXTENDED RELEASE ORAL at 11:00

## 2017-09-07 RX ADMIN — MORPHINE SULFATE 2 MILLIGRAM(S): 50 CAPSULE, EXTENDED RELEASE ORAL at 19:10

## 2017-09-07 RX ADMIN — LISINOPRIL 2.5 MILLIGRAM(S): 2.5 TABLET ORAL at 05:10

## 2017-09-07 RX ADMIN — MORPHINE SULFATE 2 MILLIGRAM(S): 50 CAPSULE, EXTENDED RELEASE ORAL at 18:53

## 2017-09-07 RX ADMIN — GABAPENTIN 100 MILLIGRAM(S): 400 CAPSULE ORAL at 05:10

## 2017-09-07 RX ADMIN — ENOXAPARIN SODIUM 40 MILLIGRAM(S): 100 INJECTION SUBCUTANEOUS at 12:24

## 2017-09-07 RX ADMIN — TAMSULOSIN HYDROCHLORIDE 0.4 MILLIGRAM(S): 0.4 CAPSULE ORAL at 21:56

## 2017-09-07 RX ADMIN — Medication 100 MILLIGRAM(S): at 21:56

## 2017-09-07 RX ADMIN — GABAPENTIN 100 MILLIGRAM(S): 400 CAPSULE ORAL at 13:11

## 2017-09-07 RX ADMIN — MORPHINE SULFATE 2 MILLIGRAM(S): 50 CAPSULE, EXTENDED RELEASE ORAL at 11:15

## 2017-09-07 RX ADMIN — Medication 2: at 12:23

## 2017-09-07 RX ADMIN — Medication 1: at 17:59

## 2017-09-07 RX ADMIN — Medication 1: at 08:39

## 2017-09-07 NOTE — ADVANCED PRACTICE NURSE CONSULT - ASSESSMENT
Abdomen assessed in lying, sitting and standing position. Stoma gideon made in LLQ, below umbilicus, impinging on the midline, avoiding creases/folds, within the rectus muscle. Explained stoma creation, and introduced pouching system to patient.  Patient able to understand use of pouching system and frequency of pouching system changes/emptying pouching system. Other questions answered about lifestyle after surgery with pouching system. Will continue to monitor patient after surgery for post-operative ostomy teaching.

## 2017-09-07 NOTE — PROGRESS NOTE ADULT - SUBJECTIVE AND OBJECTIVE BOX
Patient is a 62y old  Male who presents with a chief complaint of ischemic colitis (28 Aug 2017 11:59)      SUBJECTIVE / OVERNIGHT EVENTS:  Pt reports abd pain.     MEDICATIONS  (STANDING):  tamsulosin 0.4 milliGRAM(s) Oral at bedtime  dextrose 5%. 1000 milliLiter(s) (75 mL/Hr) IV Continuous <Continuous>  dextrose 50% Injectable 12.5 Gram(s) IV Push once  dextrose 50% Injectable 25 Gram(s) IV Push once  dextrose 50% Injectable 25 Gram(s) IV Push once  sodium biphosphate Rectal Enema 1 Enema Rectal once  insulin lispro (HumaLOG) corrective regimen sliding scale   SubCutaneous three times a day before meals  insulin lispro (HumaLOG) corrective regimen sliding scale   SubCutaneous at bedtime  gabapentin 100 milliGRAM(s) Oral three times a day  enoxaparin Injectable 40 milliGRAM(s) SubCutaneous daily  lisinopril 2.5 milliGRAM(s) Oral daily  sodium chloride 0.9%. 1000 milliLiter(s) (75 mL/Hr) IV Continuous <Continuous>  ciprofloxacin   IVPB 400 milliGRAM(s) IV Intermittent every 12 hours  metroNIDAZOLE  IVPB 500 milliGRAM(s) IV Intermittent every 8 hours  dextrose 5% + sodium chloride 0.45%. 1000 milliLiter(s) (75 mL/Hr) IV Continuous <Continuous>    MEDICATIONS  (PRN):  acetaminophen   Tablet 650 milliGRAM(s) Oral every 6 hours PRN For Temp greater than 38 C (100.4 F)  acetaminophen   Tablet. 650 milliGRAM(s) Oral every 6 hours PRN Mild Pain (1 - 3)  dextrose Gel 1 Dose(s) Oral once PRN Blood Glucose LESS THAN 70 milliGRAM(s)/deciliter  glucagon  Injectable 1 milliGRAM(s) IntraMuscular once PRN Glucose LESS THAN 70 milligrams/deciliter  melatonin 3 milliGRAM(s) Oral at bedtime PRN Insomnia  simethicone 80 milliGRAM(s) Chew every 6 hours PRN Gas  vitamin A &amp; D Ointment 1 Application(s) Topical every 3 hours PRN skin irritation  morphine  - Injectable 2 milliGRAM(s) IV Push every 4 hours PRN Moderate Pain (4 - 6)      T(C): 36.4 (17 @ 12:54), Max: 37.2 (09-06-17 @ 20:29)  HR: 94 (17 @ 12:54) (89 - 94)  BP: 107/61 (17 @ 12:54) (107/61 - 138/99)  RR: 17 (17 @ 12:54) (17 - 19)  SpO2: 94% (17 @ 12:54) (94% - 100%)  CAPILLARY BLOOD GLUCOSE  248 (07 Sep 2017 11:43)  173 (07 Sep 2017 08:28)  246 (06 Sep 2017 22:24)  150 (06 Sep 2017 16:23)        I&O's Summary      PHYSICAL EXAM:  GENERAL: NAD, well-developed  HEAD:  Atraumatic, Normocephalic  EYES: EOMI, PERRLA, conjunctiva and sclera clear  NECK: Supple, No JVD  CHEST/LUNG: Clear to auscultation bilaterally; No wheeze  HEART: s1 s2, regular rhythm and rate   ABDOMEN: Soft, mild TTP, Nondistended  EXTREMITIES:  no edema   PSYCH: AAOx3, calm   NEUROLOGY: non-focal  SKIN: No rashes or lesions    LABS:                        10.3   11.88 )-----------( 586      ( 07 Sep 2017 05:30 )             31.2     -    136  |  98  |  7   ----------------------------<  245<H>  3.8   |  23  |  0.67    Ca    8.4      07 Sep 2017 13:50  Phos  2.8     -  Mg     1.8     -06      PT/INR - ( 07 Sep 2017 05:30 )   PT: 16.8 SEC;   INR: 1.49          PTT - ( 07 Sep 2017 05:30 )  PTT:28.6 SEC      Urinalysis Basic - ( 07 Sep 2017 13:15 )    Color: YELLOW / Appearance: CLEAR / S.008 / pH: 6.5  Gluc: NEGATIVE / Ketone: NEGATIVE  / Bili: NEGATIVE / Urobili: NORMAL E.U.   Blood: NEGATIVE / Protein: NEGATIVE / Nitrite: NEGATIVE   Leuk Esterase: TRACE / RBC: 0-2 / WBC 2-5   Sq Epi: OCC / Non Sq Epi: x / Bacteria: FEW        RADIOLOGY & ADDITIONAL TESTS:    Imaging Personally Reviewed:    Consultant(s) Notes Reviewed:      Care Discussed with Consultants/Other Providers:

## 2017-09-07 NOTE — PROGRESS NOTE ADULT - ASSESSMENT
Patient is a 61 yo M with ischemic colitis, no more BRBPR, but continues to have abdominal pain.  Plan to go to the OR tomorrow at 1500 OR12 (could change)  -NPOpMN  -stat BMP and repleat electrolytes to goal K+ or 4, Mg of 2 and phos of 3  -AM BMP, CBC, Coags, T&S, UA  -consent in chart    C Alec #[id number]

## 2017-09-07 NOTE — PROGRESS NOTE ADULT - ASSESSMENT
EKG - not in chart     a/p     1) Ischemic colitis - for OR tomorrow as symptoms are not improving no chest pains no SOB, no h/o CAD, good exercise tolerance, moderate risk for surgery tomorrow    2) Abdominal pain - 12 lead EKG shows normal sinus rhythm no STT changes, no chest pain no SOB, pt is for OR tomorrow, no chest pains no SOB, no h/o CAD, good exercise tolerance, moderate risk for surgery tomorrow    3) HTN - on lisinopril     4) DM2 - on insulin

## 2017-09-07 NOTE — PROGRESS NOTE ADULT - ASSESSMENT
62yom PMHx of DM2 (on metformin) presents as a transfer from [facility] for ischemic colitis for second opinion regarding non-surgical options available.

## 2017-09-07 NOTE — ADVANCED PRACTICE NURSE CONSULT - RECOMMEDATIONS
Please place ostomy consult s/p surgery or call service line is further assistance is needed ([phone]).

## 2017-09-07 NOTE — PROGRESS NOTE ADULT - SUBJECTIVE AND OBJECTIVE BOX
GENERAL SURGERY B TEAM PROGRESS NOTE        Procedure: Brody procedure (open)  Indication: Ischemic colitis  Orders: CBC, BMP with mag and phos, PT/INR, PTT, T&S, U/A       Subjective/Overnight    CXR: clear lung fields  UA pending    Objective    Vital Signs:   T(C): 36.7 (09-07-17 @ 05:12), Max: 37.2 (09-06-17 @ 20:29)  HR: 89 (09-07-17 @ 05:12) (89 - 99)  BP: 118/67 (09-07-17 @ 05:12) (108/66 - 138/99)  RR: 18 (09-07-17 @ 05:12) (17 - 18)  SpO2: 99% (09-07-17 @ 05:12) (99% - 100%)  Wt(kg): --    I&O                        10.3   11.88 )-----------( 586      ( 07 Sep 2017 05:30 )             31.2   09-06    134<L>  |  96<L>  |  5<L>  ----------------------------<  189<H>  3.7   |  20<L>  |  0.71    Ca    8.8      06 Sep 2017 05:40  Phos  2.8     09-06  Mg     1.8     09-06          Physical Exam  General:NAD  Abdomen:  S, NT, ND

## 2017-09-07 NOTE — PROGRESS NOTE ADULT - SUBJECTIVE AND OBJECTIVE BOX
INTERVAL HISTORY: pt lying in bed comfortable, not in distress, + abd pain no cp no SOB  	  MEDICATIONS:  tamsulosin 0.4 milliGRAM(s) Oral at bedtime  enoxaparin Injectable 40 milliGRAM(s) SubCutaneous daily  lisinopril 2.5 milliGRAM(s) Oral daily    ciprofloxacin   IVPB 400 milliGRAM(s) IV Intermittent every 12 hours  metroNIDAZOLE  IVPB 500 milliGRAM(s) IV Intermittent every 8 hours      acetaminophen   Tablet 650 milliGRAM(s) Oral every 6 hours PRN  acetaminophen   Tablet. 650 milliGRAM(s) Oral every 6 hours PRN  gabapentin 100 milliGRAM(s) Oral three times a day  melatonin 3 milliGRAM(s) Oral at bedtime PRN  morphine  - Injectable 2 milliGRAM(s) IV Push every 4 hours PRN    sodium biphosphate Rectal Enema 1 Enema Rectal once  simethicone 80 milliGRAM(s) Chew every 6 hours PRN    dextrose Gel 1 Dose(s) Oral once PRN  dextrose 50% Injectable 12.5 Gram(s) IV Push once  dextrose 50% Injectable 25 Gram(s) IV Push once  dextrose 50% Injectable 25 Gram(s) IV Push once  glucagon  Injectable 1 milliGRAM(s) IntraMuscular once PRN  insulin lispro (HumaLOG) corrective regimen sliding scale   SubCutaneous three times a day before meals  insulin lispro (HumaLOG) corrective regimen sliding scale   SubCutaneous at bedtime    dextrose 5%. 1000 milliLiter(s) IV Continuous <Continuous>  sodium chloride 0.9%. 1000 milliLiter(s) IV Continuous <Continuous>  vitamin A &amp; D Ointment 1 Application(s) Topical every 3 hours PRN  dextrose 5% + sodium chloride 0.45%. 1000 milliLiter(s) IV Continuous <Continuous>      PHYSICAL EXAM:  T(C): 36.4 (09-07-17 @ 12:54), Max: 37.2 (09-06-17 @ 20:29)  HR: 94 (09-07-17 @ 12:54) (89 - 94)  BP: 107/61 (09-07-17 @ 12:54) (107/61 - 138/99)  RR: 17 (09-07-17 @ 12:54) (17 - 19)  SpO2: 94% (09-07-17 @ 12:54) (94% - 100%)  Wt(kg): --  I&O's Summary        Appearance: Normal	  HEENT:   Normal oral mucosa, PERRL, EOMI	  Cardiovascular: Normal S1 S2, No JVD, No murmurs, No edema  Respiratory: Lungs clear to auscultation	  Gastrointestinal:  MILD DIFFUSE TENDERNESS  Extremities: Normal range of motion, No clubbing, cyanosis or edema                                    10.3   11.88 )-----------( 586      ( 07 Sep 2017 05:30 )             31.2     09-07    136  |  98  |  7   ----------------------------<  245<H>  3.8   |  23  |  0.67    Ca    8.4      07 Sep 2017 13:50  Phos  2.8     09-06  Mg     1.8     09-06      proBNP:   Lipid Profile:   HgA1c:   TSH:

## 2017-09-08 ENCOUNTER — APPOINTMENT (OUTPATIENT)
Dept: COLORECTAL SURGERY | Facility: HOSPITAL | Age: 62
End: 2017-09-08

## 2017-09-08 ENCOUNTER — RESULT REVIEW (OUTPATIENT)
Age: 62
End: 2017-09-08

## 2017-09-08 LAB
ALBUMIN SERPL ELPH-MCNC: 2.7 G/DL — LOW (ref 3.3–5)
ALP SERPL-CCNC: 46 U/L — SIGNIFICANT CHANGE UP (ref 40–120)
ALT FLD-CCNC: 16 U/L — SIGNIFICANT CHANGE UP (ref 4–41)
APTT BLD: 28.3 SEC — SIGNIFICANT CHANGE UP (ref 27.5–37.4)
AST SERPL-CCNC: 16 U/L — SIGNIFICANT CHANGE UP (ref 4–40)
BASE EXCESS BLDA CALC-SCNC: -1.6 MMOL/L — SIGNIFICANT CHANGE UP
BASE EXCESS BLDA CALC-SCNC: 2 MMOL/L — SIGNIFICANT CHANGE UP
BASOPHILS # BLD AUTO: 0.02 K/UL — SIGNIFICANT CHANGE UP (ref 0–0.2)
BASOPHILS NFR BLD AUTO: 0.2 % — SIGNIFICANT CHANGE UP (ref 0–2)
BILIRUB SERPL-MCNC: 0.2 MG/DL — SIGNIFICANT CHANGE UP (ref 0.2–1.2)
BUN SERPL-MCNC: 4 MG/DL — LOW (ref 7–23)
CA-I BLDA-SCNC: 1.08 MMOL/L — LOW (ref 1.15–1.29)
CALCIUM SERPL-MCNC: 8.2 MG/DL — LOW (ref 8.4–10.5)
CHLORIDE SERPL-SCNC: 102 MMOL/L — SIGNIFICANT CHANGE UP (ref 98–107)
CO2 SERPL-SCNC: 24 MMOL/L — SIGNIFICANT CHANGE UP (ref 22–31)
CREAT SERPL-MCNC: 0.55 MG/DL — SIGNIFICANT CHANGE UP (ref 0.5–1.3)
EOSINOPHIL # BLD AUTO: 0.22 K/UL — SIGNIFICANT CHANGE UP (ref 0–0.5)
EOSINOPHIL NFR BLD AUTO: 2.3 % — SIGNIFICANT CHANGE UP (ref 0–6)
GLUCOSE BLDA-MCNC: 247 MG/DL — HIGH (ref 70–99)
GLUCOSE BLDA-MCNC: 248 MG/DL — HIGH (ref 70–99)
GLUCOSE SERPL-MCNC: 203 MG/DL — HIGH (ref 70–99)
HCO3 BLDA-SCNC: 23 MMOL/L — SIGNIFICANT CHANGE UP (ref 22–26)
HCO3 BLDA-SCNC: 26 MMOL/L — SIGNIFICANT CHANGE UP (ref 22–26)
HCT VFR BLD CALC: 32.3 % — LOW (ref 39–50)
HCT VFR BLDA CALC: 26.4 % — LOW (ref 39–51)
HCT VFR BLDA CALC: 34.9 % — LOW (ref 39–51)
HGB BLD-MCNC: 10.7 G/DL — LOW (ref 13–17)
HGB BLDA-MCNC: 11.3 G/DL — LOW (ref 13–17)
HGB BLDA-MCNC: 8.5 G/DL — LOW (ref 13–17)
IMM GRANULOCYTES # BLD AUTO: 0.09 # — SIGNIFICANT CHANGE UP
IMM GRANULOCYTES NFR BLD AUTO: 0.9 % — SIGNIFICANT CHANGE UP (ref 0–1.5)
INR BLD: 1.46 — HIGH (ref 0.88–1.17)
LYMPHOCYTES # BLD AUTO: 1.27 K/UL — SIGNIFICANT CHANGE UP (ref 1–3.3)
LYMPHOCYTES # BLD AUTO: 13 % — SIGNIFICANT CHANGE UP (ref 13–44)
MAGNESIUM SERPL-MCNC: 1.8 MG/DL — SIGNIFICANT CHANGE UP (ref 1.6–2.6)
MCHC RBC-ENTMCNC: 27.9 PG — SIGNIFICANT CHANGE UP (ref 27–34)
MCHC RBC-ENTMCNC: 33.1 % — SIGNIFICANT CHANGE UP (ref 32–36)
MCV RBC AUTO: 84.3 FL — SIGNIFICANT CHANGE UP (ref 80–100)
MONOCYTES # BLD AUTO: 0.8 K/UL — SIGNIFICANT CHANGE UP (ref 0–0.9)
MONOCYTES NFR BLD AUTO: 8.2 % — SIGNIFICANT CHANGE UP (ref 2–14)
NEUTROPHILS # BLD AUTO: 7.34 K/UL — SIGNIFICANT CHANGE UP (ref 1.8–7.4)
NEUTROPHILS NFR BLD AUTO: 75.4 % — SIGNIFICANT CHANGE UP (ref 43–77)
NRBC # FLD: 0 — SIGNIFICANT CHANGE UP
PCO2 BLDA: 39 MMHG — SIGNIFICANT CHANGE UP (ref 35–48)
PCO2 BLDA: 43 MMHG — SIGNIFICANT CHANGE UP (ref 35–48)
PH BLDA: 7.36 PH — SIGNIFICANT CHANGE UP (ref 7.35–7.45)
PH BLDA: 7.44 PH — SIGNIFICANT CHANGE UP (ref 7.35–7.45)
PHOSPHATE SERPL-MCNC: 2.6 MG/DL — SIGNIFICANT CHANGE UP (ref 2.5–4.5)
PLATELET # BLD AUTO: 589 K/UL — HIGH (ref 150–400)
PMV BLD: 8.6 FL — SIGNIFICANT CHANGE UP (ref 7–13)
PO2 BLDA: 185 MMHG — HIGH (ref 83–108)
PO2 BLDA: 215 MMHG — HIGH (ref 83–108)
POTASSIUM BLDA-SCNC: 3.1 MMOL/L — LOW (ref 3.4–4.5)
POTASSIUM BLDA-SCNC: 3.3 MMOL/L — LOW (ref 3.4–4.5)
POTASSIUM SERPL-MCNC: 3.8 MMOL/L — SIGNIFICANT CHANGE UP (ref 3.5–5.3)
POTASSIUM SERPL-SCNC: 3.8 MMOL/L — SIGNIFICANT CHANGE UP (ref 3.5–5.3)
PROT SERPL-MCNC: 6.3 G/DL — SIGNIFICANT CHANGE UP (ref 6–8.3)
PROTHROM AB SERPL-ACNC: 16.5 SEC — HIGH (ref 9.8–13.1)
RBC # BLD: 3.83 M/UL — LOW (ref 4.2–5.8)
RBC # FLD: 14 % — SIGNIFICANT CHANGE UP (ref 10.3–14.5)
SAO2 % BLDA: 99.3 % — HIGH (ref 95–99)
SAO2 % BLDA: 99.4 % — HIGH (ref 95–99)
SODIUM BLDA-SCNC: 134 MMOL/L — LOW (ref 136–146)
SODIUM BLDA-SCNC: 135 MMOL/L — LOW (ref 136–146)
SODIUM SERPL-SCNC: 138 MMOL/L — SIGNIFICANT CHANGE UP (ref 135–145)
WBC # BLD: 9.74 K/UL — SIGNIFICANT CHANGE UP (ref 3.8–10.5)
WBC # FLD AUTO: 9.74 K/UL — SIGNIFICANT CHANGE UP (ref 3.8–10.5)

## 2017-09-08 PROCEDURE — 44143 PARTIAL REMOVAL OF COLON: CPT

## 2017-09-08 PROCEDURE — 99232 SBSQ HOSP IP/OBS MODERATE 35: CPT

## 2017-09-08 PROCEDURE — 88307 TISSUE EXAM BY PATHOLOGIST: CPT | Mod: 26

## 2017-09-08 RX ORDER — KETOROLAC TROMETHAMINE 30 MG/ML
15 SYRINGE (ML) INJECTION EVERY 8 HOURS
Qty: 0 | Refills: 0 | Status: DISCONTINUED | OUTPATIENT
Start: 2017-09-09 | End: 2017-09-14

## 2017-09-08 RX ORDER — HYDROMORPHONE HYDROCHLORIDE 2 MG/ML
0.5 INJECTION INTRAMUSCULAR; INTRAVENOUS; SUBCUTANEOUS
Qty: 0 | Refills: 0 | Status: DISCONTINUED | OUTPATIENT
Start: 2017-09-08 | End: 2017-09-10

## 2017-09-08 RX ORDER — FENTANYL CITRATE 50 UG/ML
25 INJECTION INTRAVENOUS
Qty: 0 | Refills: 0 | Status: DISCONTINUED | OUTPATIENT
Start: 2017-09-08 | End: 2017-09-08

## 2017-09-08 RX ORDER — HYDROMORPHONE HYDROCHLORIDE 2 MG/ML
0.5 INJECTION INTRAMUSCULAR; INTRAVENOUS; SUBCUTANEOUS
Qty: 0 | Refills: 0 | Status: DISCONTINUED | OUTPATIENT
Start: 2017-09-08 | End: 2017-09-08

## 2017-09-08 RX ORDER — ACETAMINOPHEN 500 MG
1000 TABLET ORAL ONCE
Qty: 0 | Refills: 0 | Status: COMPLETED | OUTPATIENT
Start: 2017-09-08 | End: 2017-09-08

## 2017-09-08 RX ORDER — NALOXONE HYDROCHLORIDE 4 MG/.1ML
0.1 SPRAY NASAL
Qty: 0 | Refills: 0 | Status: DISCONTINUED | OUTPATIENT
Start: 2017-09-08 | End: 2017-09-10

## 2017-09-08 RX ORDER — FENTANYL CITRATE 50 UG/ML
50 INJECTION INTRAVENOUS
Qty: 0 | Refills: 0 | Status: DISCONTINUED | OUTPATIENT
Start: 2017-09-08 | End: 2017-09-08

## 2017-09-08 RX ORDER — POTASSIUM CHLORIDE 20 MEQ
10 PACKET (EA) ORAL
Qty: 0 | Refills: 0 | Status: COMPLETED | OUTPATIENT
Start: 2017-09-08 | End: 2017-09-08

## 2017-09-08 RX ORDER — ONDANSETRON 8 MG/1
4 TABLET, FILM COATED ORAL ONCE
Qty: 0 | Refills: 0 | Status: DISCONTINUED | OUTPATIENT
Start: 2017-09-08 | End: 2017-09-08

## 2017-09-08 RX ORDER — HYDROMORPHONE HYDROCHLORIDE 2 MG/ML
30 INJECTION INTRAMUSCULAR; INTRAVENOUS; SUBCUTANEOUS
Qty: 0 | Refills: 0 | Status: DISCONTINUED | OUTPATIENT
Start: 2017-09-08 | End: 2017-09-10

## 2017-09-08 RX ORDER — ONDANSETRON 8 MG/1
4 TABLET, FILM COATED ORAL EVERY 6 HOURS
Qty: 0 | Refills: 0 | Status: DISCONTINUED | OUTPATIENT
Start: 2017-09-08 | End: 2017-09-14

## 2017-09-08 RX ORDER — SODIUM CHLORIDE 9 MG/ML
1000 INJECTION, SOLUTION INTRAVENOUS
Qty: 0 | Refills: 0 | Status: DISCONTINUED | OUTPATIENT
Start: 2017-09-08 | End: 2017-09-11

## 2017-09-08 RX ADMIN — Medication 100 MILLIGRAM(S): at 14:23

## 2017-09-08 RX ADMIN — GABAPENTIN 100 MILLIGRAM(S): 400 CAPSULE ORAL at 23:45

## 2017-09-08 RX ADMIN — TAMSULOSIN HYDROCHLORIDE 0.4 MILLIGRAM(S): 0.4 CAPSULE ORAL at 23:45

## 2017-09-08 RX ADMIN — Medication 100 MILLIGRAM(S): at 05:02

## 2017-09-08 RX ADMIN — MORPHINE SULFATE 2 MILLIGRAM(S): 50 CAPSULE, EXTENDED RELEASE ORAL at 12:52

## 2017-09-08 RX ADMIN — MORPHINE SULFATE 2 MILLIGRAM(S): 50 CAPSULE, EXTENDED RELEASE ORAL at 13:05

## 2017-09-08 RX ADMIN — HYDROMORPHONE HYDROCHLORIDE 30 MILLILITER(S): 2 INJECTION INTRAMUSCULAR; INTRAVENOUS; SUBCUTANEOUS at 19:19

## 2017-09-08 RX ADMIN — Medication 100 MILLIEQUIVALENT(S): at 21:50

## 2017-09-08 RX ADMIN — LISINOPRIL 2.5 MILLIGRAM(S): 2.5 TABLET ORAL at 05:03

## 2017-09-08 RX ADMIN — Medication 2: at 09:12

## 2017-09-08 RX ADMIN — Medication 100 MILLIEQUIVALENT(S): at 19:59

## 2017-09-08 RX ADMIN — Medication 1: at 12:14

## 2017-09-08 RX ADMIN — Medication 200 MILLIGRAM(S): at 06:07

## 2017-09-08 RX ADMIN — SODIUM CHLORIDE 100 MILLILITER(S): 9 INJECTION, SOLUTION INTRAVENOUS at 19:24

## 2017-09-08 RX ADMIN — Medication 100 MILLIEQUIVALENT(S): at 20:54

## 2017-09-08 RX ADMIN — SODIUM CHLORIDE 75 MILLILITER(S): 9 INJECTION, SOLUTION INTRAVENOUS at 00:17

## 2017-09-08 NOTE — PROGRESS NOTE ADULT - PROBLEM SELECTOR PROBLEM 4
Hypokalemia
Hypophosphatemia
Preventative health care
Urinary retention
Urinary retention
Hypophosphatemia

## 2017-09-08 NOTE — PROGRESS NOTE ADULT - PROBLEM SELECTOR PROBLEM 5
Diabetic neuropathy
Hypophosphatemia
Diabetic neuropathy
Preventative health care
Preventative health care

## 2017-09-08 NOTE — PROGRESS NOTE ADULT - PROBLEM SELECTOR PLAN 4
replete phos
repleted k
Enoxaparin for DVT ppx
Likely due to BPH  -will follow up with urology as outpatient per [facility] notes  -continue with flomax
replete phos
resolved
suspect due to BPH  -will follow up with urology as outpatient per [facility] notes  -continue with flomax

## 2017-09-08 NOTE — PROGRESS NOTE ADULT - ASSESSMENT
62yom PMHx of DM2 (on metformin) presents as a transfer from [facility] for ischemic colitis for second opinion regarding non-surgical options available.

## 2017-09-08 NOTE — PROGRESS NOTE ADULT - PROBLEM SELECTOR PROBLEM 10
Preventative health care
Thrombocytosis

## 2017-09-08 NOTE — PROGRESS NOTE ADULT - SUBJECTIVE AND OBJECTIVE BOX
INTERVAL HISTORY: pt is lying in bed comfortable, for OR today  	  MEDICATIONS:  tamsulosin 0.4 milliGRAM(s) Oral at bedtime  enoxaparin Injectable 40 milliGRAM(s) SubCutaneous daily  lisinopril 2.5 milliGRAM(s) Oral daily    ciprofloxacin   IVPB 400 milliGRAM(s) IV Intermittent every 12 hours  metroNIDAZOLE  IVPB 500 milliGRAM(s) IV Intermittent every 8 hours      acetaminophen   Tablet 650 milliGRAM(s) Oral every 6 hours PRN  acetaminophen   Tablet. 650 milliGRAM(s) Oral every 6 hours PRN  gabapentin 100 milliGRAM(s) Oral three times a day  melatonin 3 milliGRAM(s) Oral at bedtime PRN  morphine  - Injectable 2 milliGRAM(s) IV Push every 4 hours PRN    sodium biphosphate Rectal Enema 1 Enema Rectal once  simethicone 80 milliGRAM(s) Chew every 6 hours PRN    dextrose Gel 1 Dose(s) Oral once PRN  dextrose 50% Injectable 12.5 Gram(s) IV Push once  dextrose 50% Injectable 25 Gram(s) IV Push once  dextrose 50% Injectable 25 Gram(s) IV Push once  glucagon  Injectable 1 milliGRAM(s) IntraMuscular once PRN  insulin lispro (HumaLOG) corrective regimen sliding scale   SubCutaneous three times a day before meals  insulin lispro (HumaLOG) corrective regimen sliding scale   SubCutaneous at bedtime    dextrose 5%. 1000 milliLiter(s) IV Continuous <Continuous>  sodium chloride 0.9%. 1000 milliLiter(s) IV Continuous <Continuous>  vitamin A &amp; D Ointment 1 Application(s) Topical every 3 hours PRN  dextrose 5% + sodium chloride 0.45%. 1000 milliLiter(s) IV Continuous <Continuous>      PHYSICAL EXAM:  T(C): 36.8 (09-08-17 @ 04:57), Max: 36.8 (09-08-17 @ 04:57)  HR: 93 (09-08-17 @ 04:57) (93 - 98)  BP: 113/74 (09-08-17 @ 04:57) (107/61 - 116/64)  RR: 18 (09-08-17 @ 04:57) (17 - 18)  SpO2: 98% (09-08-17 @ 04:57) (94% - 99%)  Wt(kg): --  I&O's Summary        Appearance: Normal	  HEENT:   Normal oral mucosa, PERRL, EOMI	  Cardiovascular: Normal S1 S2, No JVD, No murmurs, No edema  Respiratory: Lungs clear to auscultation	  Gastrointestinal:  mild abd pain  Extremities: Normal range of motion, No clubbing, cyanosis or edema                                    10.7   9.74  )-----------( 589      ( 08 Sep 2017 05:30 )             32.3     09-08    138  |  102  |  4<L>  ----------------------------<  203<H>  3.8   |  24  |  0.55    Ca    8.2<L>      08 Sep 2017 05:30  Phos  2.6     09-08  Mg     1.8     09-08    TPro  6.3  /  Alb  2.7<L>  /  TBili  0.2  /  DBili  x   /  AST  16  /  ALT  16  /  AlkPhos  46  09-08    proBNP:   Lipid Profile:   HgA1c:   TSH:

## 2017-09-08 NOTE — PROGRESS NOTE ADULT - PROBLEM SELECTOR PLAN 3
offered benadryl but pt declined, will monitor for now
repleted k
HbA1c <7, BG at goal  -continue with sliding scale and check FS TID and qhs
HbA1c <7, BG at goal  -continue with sliding scale and check FS daily
repleted k
resolved with supplement
suspect due to BPH  -will follow up with urology as outpatient per [facility] notes  -continue with flomax

## 2017-09-08 NOTE — PROGRESS NOTE ADULT - SUBJECTIVE AND OBJECTIVE BOX
Patient is a 62y old  Male who presents with a chief complaint of ischemic colitis (28 Aug 2017 11:59)      SUBJECTIVE / OVERNIGHT EVENTS:  Pt reports abd pain when eating. No other complaints.     MEDICATIONS  (STANDING):  tamsulosin 0.4 milliGRAM(s) Oral at bedtime  dextrose 5%. 1000 milliLiter(s) (75 mL/Hr) IV Continuous <Continuous>  dextrose 50% Injectable 12.5 Gram(s) IV Push once  dextrose 50% Injectable 25 Gram(s) IV Push once  dextrose 50% Injectable 25 Gram(s) IV Push once  sodium biphosphate Rectal Enema 1 Enema Rectal once  insulin lispro (HumaLOG) corrective regimen sliding scale   SubCutaneous three times a day before meals  insulin lispro (HumaLOG) corrective regimen sliding scale   SubCutaneous at bedtime  gabapentin 100 milliGRAM(s) Oral three times a day  enoxaparin Injectable 40 milliGRAM(s) SubCutaneous daily  lisinopril 2.5 milliGRAM(s) Oral daily  sodium chloride 0.9%. 1000 milliLiter(s) (75 mL/Hr) IV Continuous <Continuous>  ciprofloxacin   IVPB 400 milliGRAM(s) IV Intermittent every 12 hours  metroNIDAZOLE  IVPB 500 milliGRAM(s) IV Intermittent every 8 hours  dextrose 5% + sodium chloride 0.45%. 1000 milliLiter(s) (75 mL/Hr) IV Continuous <Continuous>    MEDICATIONS  (PRN):  acetaminophen   Tablet 650 milliGRAM(s) Oral every 6 hours PRN For Temp greater than 38 C (100.4 F)  acetaminophen   Tablet. 650 milliGRAM(s) Oral every 6 hours PRN Mild Pain (1 - 3)  dextrose Gel 1 Dose(s) Oral once PRN Blood Glucose LESS THAN 70 milliGRAM(s)/deciliter  glucagon  Injectable 1 milliGRAM(s) IntraMuscular once PRN Glucose LESS THAN 70 milligrams/deciliter  melatonin 3 milliGRAM(s) Oral at bedtime PRN Insomnia  simethicone 80 milliGRAM(s) Chew every 6 hours PRN Gas  vitamin A &amp; D Ointment 1 Application(s) Topical every 3 hours PRN skin irritation  morphine  - Injectable 2 milliGRAM(s) IV Push every 4 hours PRN Moderate Pain (4 - 6)      T(C): 36.8 (17 @ 04:57), Max: 36.8 (17 @ 04:57)  HR: 93 (17 @ 04:57) (93 - 98)  BP: 113/74 (17 @ 04:57) (107/61 - 116/64)  RR: 18 (17 @ 04:57) (17 - 18)  SpO2: 98% (17 @ 04:57) (94% - 99%)  CAPILLARY BLOOD GLUCOSE  162 (08 Sep 2017 11:52)  229 (08 Sep 2017 08:45)  232 (07 Sep 2017 22:14)  179 (07 Sep 2017 16:37)        I&O's Summary      PHYSICAL EXAM:  GENERAL: NAD, well-developed  HEAD:  Atraumatic, Normocephalic  EYES: EOMI, PERRLA, conjunctiva and sclera clear  NECK: Supple, No JVD  CHEST/LUNG: Clear to auscultation bilaterally; No wheeze  HEART: s1 s2, regular rhythm and rate   ABDOMEN: Soft, Nontender, Nondistended; Bowel sounds present  EXTREMITIES:  2+ Peripheral Pulses, No clubbing, cyanosis, or edema  PSYCH: AAOx3, calm   NEUROLOGY: non-focal  SKIN: No rashes or lesions    LABS:                        10.7   9.74  )-----------( 589      ( 08 Sep 2017 05:30 )             32.3     -    138  |  102  |  4<L>  ----------------------------<  203<H>  3.8   |  24  |  0.55    Ca    8.2<L>      08 Sep 2017 05:30  Phos  2.6       Mg     1.8         TPro  6.3  /  Alb  2.7<L>  /  TBili  0.2  /  DBili  x   /  AST  16  /  ALT  16  /  AlkPhos  46  -    PT/INR - ( 08 Sep 2017 05:30 )   PT: 16.5 SEC;   INR: 1.46          PTT - ( 08 Sep 2017 05:30 )  PTT:28.3 SEC      Urinalysis Basic - ( 07 Sep 2017 13:15 )    Color: YELLOW / Appearance: CLEAR / S.008 / pH: 6.5  Gluc: NEGATIVE / Ketone: NEGATIVE  / Bili: NEGATIVE / Urobili: NORMAL E.U.   Blood: NEGATIVE / Protein: NEGATIVE / Nitrite: NEGATIVE   Leuk Esterase: TRACE / RBC: 0-2 / WBC 2-5   Sq Epi: OCC / Non Sq Epi: x / Bacteria: FEW        RADIOLOGY & ADDITIONAL TESTS:    Imaging Personally Reviewed:    Consultant(s) Notes Reviewed:      Care Discussed with Consultants/Other Providers:

## 2017-09-08 NOTE — PROGRESS NOTE ADULT - ASSESSMENT
EKG - not in chart     a/p     1) Ischemic colitis - for OR today    2) Abdominal pain - 12 lead EKG shows normal sinus rhythm no STT changes, no chest pain no SOB, pt is for OR tomorrow, no chest pains no SOB, no h/o CAD, good exercise tolerance, moderate risk for surgery today    3) HTN - on lisinopril     4) DM2 - on insulin

## 2017-09-08 NOTE — PROGRESS NOTE ADULT - PROBLEM SELECTOR PROBLEM 3
Hypokalemia
Skin rash
Diabetes mellitus type 2, noninsulin dependent
Diabetes mellitus type 2, noninsulin dependent
Hypokalemia
Urinary retention

## 2017-09-08 NOTE — PROGRESS NOTE ADULT - PROBLEM SELECTOR PROBLEM 9
Urinary retention
Preventative health care

## 2017-09-08 NOTE — PROGRESS NOTE ADULT - PROBLEM SELECTOR PROBLEM 2
Leukocytosis
Leukocytosis
Diabetes mellitus type 2, noninsulin dependent
Leukocytosis

## 2017-09-08 NOTE — PROGRESS NOTE ADULT - PROBLEM SELECTOR PLAN 10
Enoxaparin for DVT ppx
likely reactive
Enoxaparin for DVT ppx

## 2017-09-08 NOTE — PROGRESS NOTE ADULT - PROBLEM SELECTOR PLAN 5
will add neurontin
Improve score 0, ambulate
Improve score 0, ambulate
c/w neurontin
replete phos

## 2017-09-08 NOTE — PROGRESS NOTE ADULT - PROBLEM SELECTOR PLAN 9
suspect due to BPH  -will follow up with urology as outpatient per [facility] notes  -continue with flomax
Enoxaparin for DVT ppx

## 2017-09-09 LAB
ALBUMIN SERPL ELPH-MCNC: 3.3 G/DL — SIGNIFICANT CHANGE UP (ref 3.3–5)
ALP SERPL-CCNC: 38 U/L — LOW (ref 40–120)
ALT FLD-CCNC: 12 U/L — SIGNIFICANT CHANGE UP (ref 4–41)
AST SERPL-CCNC: 19 U/L — SIGNIFICANT CHANGE UP (ref 4–40)
BILIRUB SERPL-MCNC: 0.4 MG/DL — SIGNIFICANT CHANGE UP (ref 0.2–1.2)
BUN SERPL-MCNC: 2 MG/DL — LOW (ref 7–23)
CALCIUM SERPL-MCNC: 8.6 MG/DL — SIGNIFICANT CHANGE UP (ref 8.4–10.5)
CHLORIDE SERPL-SCNC: 99 MMOL/L — SIGNIFICANT CHANGE UP (ref 98–107)
CO2 SERPL-SCNC: 25 MMOL/L — SIGNIFICANT CHANGE UP (ref 22–31)
CREAT SERPL-MCNC: 0.67 MG/DL — SIGNIFICANT CHANGE UP (ref 0.5–1.3)
GLUCOSE SERPL-MCNC: 195 MG/DL — HIGH (ref 70–99)
HCT VFR BLD CALC: 34.6 % — LOW (ref 39–50)
HGB BLD-MCNC: 11.7 G/DL — LOW (ref 13–17)
MCHC RBC-ENTMCNC: 29.2 PG — SIGNIFICANT CHANGE UP (ref 27–34)
MCHC RBC-ENTMCNC: 33.8 % — SIGNIFICANT CHANGE UP (ref 32–36)
MCV RBC AUTO: 86.3 FL — SIGNIFICANT CHANGE UP (ref 80–100)
NRBC # FLD: 0 — SIGNIFICANT CHANGE UP
PLATELET # BLD AUTO: 500 K/UL — HIGH (ref 150–400)
PMV BLD: 8.7 FL — SIGNIFICANT CHANGE UP (ref 7–13)
POTASSIUM SERPL-MCNC: 4.5 MMOL/L — SIGNIFICANT CHANGE UP (ref 3.5–5.3)
POTASSIUM SERPL-SCNC: 4.5 MMOL/L — SIGNIFICANT CHANGE UP (ref 3.5–5.3)
PROT SERPL-MCNC: 6.2 G/DL — SIGNIFICANT CHANGE UP (ref 6–8.3)
RBC # BLD: 4.01 M/UL — LOW (ref 4.2–5.8)
RBC # FLD: 14.2 % — SIGNIFICANT CHANGE UP (ref 10.3–14.5)
SODIUM SERPL-SCNC: 138 MMOL/L — SIGNIFICANT CHANGE UP (ref 135–145)
WBC # BLD: 9.99 K/UL — SIGNIFICANT CHANGE UP (ref 3.8–10.5)
WBC # FLD AUTO: 9.99 K/UL — SIGNIFICANT CHANGE UP (ref 3.8–10.5)

## 2017-09-09 RX ORDER — SODIUM CHLORIDE 9 MG/ML
1000 INJECTION, SOLUTION INTRAVENOUS ONCE
Qty: 0 | Refills: 0 | Status: COMPLETED | OUTPATIENT
Start: 2017-09-09 | End: 2017-09-09

## 2017-09-09 RX ADMIN — SODIUM CHLORIDE 100 MILLILITER(S): 9 INJECTION, SOLUTION INTRAVENOUS at 13:18

## 2017-09-09 RX ADMIN — ENOXAPARIN SODIUM 40 MILLIGRAM(S): 100 INJECTION SUBCUTANEOUS at 13:17

## 2017-09-09 RX ADMIN — HYDROMORPHONE HYDROCHLORIDE 30 MILLILITER(S): 2 INJECTION INTRAMUSCULAR; INTRAVENOUS; SUBCUTANEOUS at 11:31

## 2017-09-09 RX ADMIN — HYDROMORPHONE HYDROCHLORIDE 30 MILLILITER(S): 2 INJECTION INTRAMUSCULAR; INTRAVENOUS; SUBCUTANEOUS at 20:12

## 2017-09-09 RX ADMIN — TAMSULOSIN HYDROCHLORIDE 0.4 MILLIGRAM(S): 0.4 CAPSULE ORAL at 21:40

## 2017-09-09 RX ADMIN — SODIUM CHLORIDE 1000 MILLILITER(S): 9 INJECTION, SOLUTION INTRAVENOUS at 16:11

## 2017-09-09 RX ADMIN — Medication 1: at 13:18

## 2017-09-09 RX ADMIN — GABAPENTIN 100 MILLIGRAM(S): 400 CAPSULE ORAL at 06:05

## 2017-09-09 RX ADMIN — GABAPENTIN 100 MILLIGRAM(S): 400 CAPSULE ORAL at 13:17

## 2017-09-09 RX ADMIN — GABAPENTIN 100 MILLIGRAM(S): 400 CAPSULE ORAL at 21:40

## 2017-09-09 RX ADMIN — Medication: at 09:00

## 2017-09-09 RX ADMIN — Medication 1: at 17:53

## 2017-09-09 NOTE — PROGRESS NOTE ADULT - ASSESSMENT
A: Patient is a 62y old Man s/p Brody resection of colon. Rogers removed, patient voided.    P:  - Pain control  - F/u labs and imaging  - Ostomy appliance change  - Ostomy teaching  - OOB, advance activity as tolerated

## 2017-09-09 NOTE — PROGRESS NOTE ADULT - SUBJECTIVE AND OBJECTIVE BOX
S: 61yo Man seen and examined during morning rounds. No acute events overnight. Pain well controlled with current regimen. Tolerating CLD; denies N/V.     O:    Physical Exam:  Gen: Resting in bed, NAD, alert and oriented.   Resp: airway patent, respirations unlabored, no increased WOB   Abd: soft, distended, appropriately tender, gas and bowel sweat in bag, ostomy pink    LABS:                        11.7   9.99  )-----------( 500      ( 09 Sep 2017 06:20 )             34.6       09-09    138  |  99  |  2<L>  ----------------------------<  195<H>  4.5   |  25  |  0.67    Ca    8.6      09 Sep 2017 06:20  Phos  2.6     09-08  Mg     1.8     09-08    TPro  6.2  /  Alb  3.3  /  TBili  0.4  /  DBili  x   /  AST  19  /  ALT  12  /  AlkPhos  38<L>  09-09

## 2017-09-09 NOTE — PROGRESS NOTE ADULT - ATTENDING COMMENTS
Computer systems down yesterday    9/10/17  Pt feels bloated but has had air from his colostomy. Feels nauseous but no vomiting. + ambulating    AAOx3  abd softly distended, tender at incision, incision c/d/i with staples, LLQ colostomy pink and viable with small amnt of air in bag    a/p s/p Brody's for ischemic colitis  clears, await increased bowel function  OOB ambulate  pain control

## 2017-09-09 NOTE — PROGRESS NOTE ADULT - ASSESSMENT
EKG - not in chart     a/p     1) Ischemic colitis - s/p gray procedure doing well, no cp no SOB    2) HTN - lisinopril ON HOLD    3) DM2 - on insulin

## 2017-09-09 NOTE — PROGRESS NOTE ADULT - SUBJECTIVE AND OBJECTIVE BOX
INTERVAL HISTORY: pt s/o OR, gray procedure, doing well, no complications no cp no SOB  	  MEDICATIONS:  tamsulosin 0.4 milliGRAM(s) Oral at bedtime  enoxaparin Injectable 40 milliGRAM(s) SubCutaneous daily        gabapentin 100 milliGRAM(s) Oral three times a day  HYDROmorphone PCA (1 mG/mL) 30 milliLiter(s) PCA Continuous PCA Continuous  HYDROmorphone PCA (1 mG/mL) Rescue Clinician Bolus 0.5 milliGRAM(s) IV Push every 15 minutes PRN  ondansetron Injectable 4 milliGRAM(s) IV Push every 6 hours PRN      dextrose Gel 1 Dose(s) Oral once PRN  dextrose 50% Injectable 12.5 Gram(s) IV Push once  dextrose 50% Injectable 25 Gram(s) IV Push once  dextrose 50% Injectable 25 Gram(s) IV Push once  glucagon  Injectable 1 milliGRAM(s) IntraMuscular once PRN  insulin lispro (HumaLOG) corrective regimen sliding scale   SubCutaneous three times a day before meals  insulin lispro (HumaLOG) corrective regimen sliding scale   SubCutaneous at bedtime    dextrose 5%. 1000 milliLiter(s) IV Continuous <Continuous>  vitamin A &amp; D Ointment 1 Application(s) Topical every 3 hours PRN  lactated ringers. 1000 milliLiter(s) IV Continuous <Continuous>      PHYSICAL EXAM:  T(C): 37.4 (09-09-17 @ 13:27), Max: 37.4 (09-09-17 @ 13:27)  HR: 130 (09-09-17 @ 13:27) (87 - 130)  BP: 108/72 (09-09-17 @ 13:27) (104/72 - 156/74)  RR: 16 (09-09-17 @ 13:27) (12 - 20)  SpO2: 97% (09-09-17 @ 13:27) (93% - 100%)  Wt(kg): --  I&O's Summary    08 Sep 2017 07:01  -  09 Sep 2017 07:00  --------------------------------------------------------  IN: 250 mL / OUT: 175 mL / NET: 75 mL    09 Sep 2017 07:01  -  09 Sep 2017 13:43  --------------------------------------------------------  IN: 1100 mL / OUT: 2550 mL / NET: -1450 mL      Height (cm): 165.1 (09-08 @ 21:47)  Weight (kg): 63.9 (09-08 @ 21:47)  BMI (kg/m2): 23.4 (09-08 @ 21:47)  BSA (m2): 1.7 (09-08 @ 21:47)    Appearance: Normal	  HEENT:   Normal oral mucosa, PERRL, EOMI	  Cardiovascular: Normal S1 S2, No JVD, No murmurs, No edema  Respiratory: Lungs clear to auscultation	  Gastrointestinal:  s/p surgery  Extremities: Normal range of motion, No clubbing, cyanosis or edema                                    11.7   9.99  )-----------( 500      ( 09 Sep 2017 06:20 )             34.6     09-09    138  |  99  |  2<L>  ----------------------------<  195<H>  4.5   |  25  |  0.67    Ca    8.6      09 Sep 2017 06:20  Phos  2.6     09-08  Mg     1.8     09-08    TPro  6.2  /  Alb  3.3  /  TBili  0.4  /  DBili  x   /  AST  19  /  ALT  12  /  AlkPhos  38<L>  09-09    proBNP:   Lipid Profile:   HgA1c:   TSH:

## 2017-09-10 ENCOUNTER — TRANSCRIPTION ENCOUNTER (OUTPATIENT)
Age: 62
End: 2017-09-10

## 2017-09-10 DIAGNOSIS — Z43.3 ENCOUNTER FOR ATTENTION TO COLOSTOMY: ICD-10-CM

## 2017-09-10 LAB
BUN SERPL-MCNC: 4 MG/DL — LOW (ref 7–23)
CALCIUM SERPL-MCNC: 8.7 MG/DL — SIGNIFICANT CHANGE UP (ref 8.4–10.5)
CHLORIDE SERPL-SCNC: 94 MMOL/L — LOW (ref 98–107)
CO2 SERPL-SCNC: 28 MMOL/L — SIGNIFICANT CHANGE UP (ref 22–31)
CREAT SERPL-MCNC: 0.6 MG/DL — SIGNIFICANT CHANGE UP (ref 0.5–1.3)
GLUCOSE SERPL-MCNC: 173 MG/DL — HIGH (ref 70–99)
HCT VFR BLD CALC: 33.1 % — LOW (ref 39–50)
HGB BLD-MCNC: 11.3 G/DL — LOW (ref 13–17)
MAGNESIUM SERPL-MCNC: 1.8 MG/DL — SIGNIFICANT CHANGE UP (ref 1.6–2.6)
MCHC RBC-ENTMCNC: 29.4 PG — SIGNIFICANT CHANGE UP (ref 27–34)
MCHC RBC-ENTMCNC: 34.1 % — SIGNIFICANT CHANGE UP (ref 32–36)
MCV RBC AUTO: 86 FL — SIGNIFICANT CHANGE UP (ref 80–100)
NRBC # FLD: 0 — SIGNIFICANT CHANGE UP
PLATELET # BLD AUTO: 445 K/UL — HIGH (ref 150–400)
PMV BLD: 8.5 FL — SIGNIFICANT CHANGE UP (ref 7–13)
POTASSIUM SERPL-MCNC: 4 MMOL/L — SIGNIFICANT CHANGE UP (ref 3.5–5.3)
POTASSIUM SERPL-SCNC: 4 MMOL/L — SIGNIFICANT CHANGE UP (ref 3.5–5.3)
RBC # BLD: 3.85 M/UL — LOW (ref 4.2–5.8)
RBC # FLD: 14.1 % — SIGNIFICANT CHANGE UP (ref 10.3–14.5)
SODIUM SERPL-SCNC: 136 MMOL/L — SIGNIFICANT CHANGE UP (ref 135–145)
WBC # BLD: 10.14 K/UL — SIGNIFICANT CHANGE UP (ref 3.8–10.5)
WBC # FLD AUTO: 10.14 K/UL — SIGNIFICANT CHANGE UP (ref 3.8–10.5)

## 2017-09-10 PROCEDURE — 93010 ELECTROCARDIOGRAM REPORT: CPT

## 2017-09-10 RX ORDER — OXYCODONE HYDROCHLORIDE 5 MG/1
10 TABLET ORAL EVERY 6 HOURS
Qty: 0 | Refills: 0 | Status: DISCONTINUED | OUTPATIENT
Start: 2017-09-10 | End: 2017-09-14

## 2017-09-10 RX ORDER — OXYCODONE HYDROCHLORIDE 5 MG/1
5 TABLET ORAL
Qty: 0 | Refills: 0 | Status: DISCONTINUED | OUTPATIENT
Start: 2017-09-10 | End: 2017-09-14

## 2017-09-10 RX ORDER — LISINOPRIL 2.5 MG/1
2.5 TABLET ORAL DAILY
Qty: 0 | Refills: 0 | Status: DISCONTINUED | OUTPATIENT
Start: 2017-09-10 | End: 2017-09-14

## 2017-09-10 RX ORDER — ACETAMINOPHEN 500 MG
650 TABLET ORAL EVERY 6 HOURS
Qty: 0 | Refills: 0 | Status: DISCONTINUED | OUTPATIENT
Start: 2017-09-10 | End: 2017-09-14

## 2017-09-10 RX ADMIN — Medication 1: at 17:18

## 2017-09-10 RX ADMIN — ENOXAPARIN SODIUM 40 MILLIGRAM(S): 100 INJECTION SUBCUTANEOUS at 10:57

## 2017-09-10 RX ADMIN — Medication 650 MILLIGRAM(S): at 13:03

## 2017-09-10 RX ADMIN — TAMSULOSIN HYDROCHLORIDE 0.4 MILLIGRAM(S): 0.4 CAPSULE ORAL at 22:33

## 2017-09-10 RX ADMIN — GABAPENTIN 100 MILLIGRAM(S): 400 CAPSULE ORAL at 13:05

## 2017-09-10 RX ADMIN — ONDANSETRON 4 MILLIGRAM(S): 8 TABLET, FILM COATED ORAL at 22:31

## 2017-09-10 RX ADMIN — GABAPENTIN 100 MILLIGRAM(S): 400 CAPSULE ORAL at 05:17

## 2017-09-10 RX ADMIN — Medication 1: at 10:57

## 2017-09-10 RX ADMIN — Medication 650 MILLIGRAM(S): at 12:09

## 2017-09-10 RX ADMIN — Medication 650 MILLIGRAM(S): at 17:18

## 2017-09-10 RX ADMIN — GABAPENTIN 100 MILLIGRAM(S): 400 CAPSULE ORAL at 22:33

## 2017-09-10 RX ADMIN — LISINOPRIL 2.5 MILLIGRAM(S): 2.5 TABLET ORAL at 18:54

## 2017-09-10 RX ADMIN — HYDROMORPHONE HYDROCHLORIDE 30 MILLILITER(S): 2 INJECTION INTRAMUSCULAR; INTRAVENOUS; SUBCUTANEOUS at 08:15

## 2017-09-10 RX ADMIN — SODIUM CHLORIDE 100 MILLILITER(S): 9 INJECTION, SOLUTION INTRAVENOUS at 17:18

## 2017-09-10 RX ADMIN — ONDANSETRON 4 MILLIGRAM(S): 8 TABLET, FILM COATED ORAL at 12:09

## 2017-09-10 RX ADMIN — Medication: at 10:59

## 2017-09-10 RX ADMIN — Medication 650 MILLIGRAM(S): at 18:20

## 2017-09-10 NOTE — PROGRESS NOTE ADULT - SUBJECTIVE AND OBJECTIVE BOX
INTERVAL HISTORY: pt is lying in bed comfortable, not in distress  	  MEDICATIONS:  tamsulosin 0.4 milliGRAM(s) Oral at bedtime  enoxaparin Injectable 40 milliGRAM(s) SubCutaneous daily        gabapentin 100 milliGRAM(s) Oral three times a day  ondansetron Injectable 4 milliGRAM(s) IV Push every 6 hours PRN  ketorolac   Injectable 15 milliGRAM(s) IV Push every 8 hours PRN  oxyCODONE    IR 5 milliGRAM(s) Oral every 3 hours PRN  acetaminophen   Tablet. 650 milliGRAM(s) Oral every 6 hours  oxyCODONE    IR 10 milliGRAM(s) Oral every 6 hours PRN      dextrose Gel 1 Dose(s) Oral once PRN  dextrose 50% Injectable 12.5 Gram(s) IV Push once  dextrose 50% Injectable 25 Gram(s) IV Push once  dextrose 50% Injectable 25 Gram(s) IV Push once  glucagon  Injectable 1 milliGRAM(s) IntraMuscular once PRN  insulin lispro (HumaLOG) corrective regimen sliding scale   SubCutaneous three times a day before meals  insulin lispro (HumaLOG) corrective regimen sliding scale   SubCutaneous at bedtime    dextrose 5%. 1000 milliLiter(s) IV Continuous <Continuous>  vitamin A &amp; D Ointment 1 Application(s) Topical every 3 hours PRN  lactated ringers. 1000 milliLiter(s) IV Continuous <Continuous>      PHYSICAL EXAM:  T(C): 36.9 (09-10-17 @ 10:00), Max: 37.4 (09-09-17 @ 13:27)  HR: 94 (09-10-17 @ 10:00) (94 - 130)  BP: 151/92 (09-10-17 @ 10:00) (108/72 - 152/98)  RR: 17 (09-10-17 @ 10:00) (16 - 18)  SpO2: 98% (09-10-17 @ 10:00) (91% - 98%)  Wt(kg): --  I&O's Summary    09 Sep 2017 07:01  -  10 Sep 2017 07:00  --------------------------------------------------------  IN: 4300 mL / OUT: 4810 mL / NET: -510 mL          Appearance: Normal	  HEENT:   Normal oral mucosa, PERRL, EOMI	  Cardiovascular: Normal S1 S2, No JVD, No murmurs, No edema  Respiratory: Lungs clear to auscultation	  Gastrointestinal: s/p surgery  Extremities: Normal range of motion, No clubbing, cyanosis or edema                                    11.3   10.14 )-----------( 445      ( 10 Sep 2017 07:00 )             33.1     09-10    136  |  94<L>  |  4<L>  ----------------------------<  173<H>  4.0   |  28  |  0.60    Ca    8.7      10 Sep 2017 07:00  Mg     1.8     09-10    TPro  6.2  /  Alb  3.3  /  TBili  0.4  /  DBili  x   /  AST  19  /  ALT  12  /  AlkPhos  38<L>  09-09    proBNP:   Lipid Profile:   HgA1c:   TSH:

## 2017-09-10 NOTE — PROGRESS NOTE ADULT - ASSESSMENT
EKG - not in chart     a/p     1) Ischemic colitis - s/p gray procedure doing well, no cp no SOB    2) HTN - lisinopril ON HOLD, consider resuming    3) DM2 - on insulin

## 2017-09-10 NOTE — PROGRESS NOTE ADULT - SUBJECTIVE AND OBJECTIVE BOX
COLORECTAL SURGERY DAILY PROGRESS NOTE      Status post: Rectosigmoid resection and end colostomy for ischemic colitis, severe    Subjective: Pain controlled, gas within ostomy appliance. Afebrile.       Objective: No events overnight, patient has been ambulating, has ostomy gas, no nausea or vomiting.     MEDICATIONS  (STANDING):  tamsulosin 0.4 milliGRAM(s) Oral at bedtime  dextrose 5%. 1000 milliLiter(s) (75 mL/Hr) IV Continuous <Continuous>  dextrose 50% Injectable 12.5 Gram(s) IV Push once  dextrose 50% Injectable 25 Gram(s) IV Push once  dextrose 50% Injectable 25 Gram(s) IV Push once  insulin lispro (HumaLOG) corrective regimen sliding scale   SubCutaneous three times a day before meals  insulin lispro (HumaLOG) corrective regimen sliding scale   SubCutaneous at bedtime  gabapentin 100 milliGRAM(s) Oral three times a day  enoxaparin Injectable 40 milliGRAM(s) SubCutaneous daily  lactated ringers. 1000 milliLiter(s) (100 mL/Hr) IV Continuous <Continuous>  acetaminophen   Tablet. 650 milliGRAM(s) Oral every 6 hours    MEDICATIONS  (PRN):  dextrose Gel 1 Dose(s) Oral once PRN Blood Glucose LESS THAN 70 milliGRAM(s)/deciliter  glucagon  Injectable 1 milliGRAM(s) IntraMuscular once PRN Glucose LESS THAN 70 milligrams/deciliter  vitamin A &amp; D Ointment 1 Application(s) Topical every 3 hours PRN skin irritation  ondansetron Injectable 4 milliGRAM(s) IV Push every 6 hours PRN Nausea  ketorolac   Injectable 15 milliGRAM(s) IV Push every 8 hours PRN Moderate Pain (4 - 6)  oxyCODONE    IR 5 milliGRAM(s) Oral every 3 hours PRN pain  oxyCODONE    IR 10 milliGRAM(s) Oral every 6 hours PRN Severe Pain (7 - 10)      Vital Signs Last 24 Hrs  T(C): 36.9 (10 Sep 2017 10:00), Max: 36.9 (10 Sep 2017 01:59)  T(F): 98.5 (10 Sep 2017 10:00), Max: 98.5 (10 Sep 2017 05:25)  HR: 94 (10 Sep 2017 10:00) (94 - 124)  BP: 151/92 (10 Sep 2017 10:00) (139/94 - 152/98)  RR: 17 (10 Sep 2017 10:00) (16 - 18)  SpO2: 98% (10 Sep 2017 10:00) (91% - 98%)    I&O's Detail    09 Sep 2017 07:01  -  10 Sep 2017 07:00  --------------------------------------------------------  IN:    lactated ringers.: 3100 mL    Oral Fluid: 400 mL    Other: 800 mL  Total IN: 4300 mL    OUT:    Colostomy: 10 mL    Indwelling Catheter - Urethral: 650 mL    Other: 1900 mL    Voided: 2250 mL  Total OUT: 4810 mL    Total NET: -510 mL    General: WN/WD NAD  Neurology: A&Ox3, nonfocal, BAINS x 4  Respiratory: Nonlabored  CV: RRR  Abdominal: Softly distended, minimal incisional tenderness, gas within ostomy appliance.   MSK: No edema, + peripheral pulses, FROM all 4 extremity  Incisions: intact, no erythema or drainage      LABS:                        11.3   10.14 )-----------( 445      ( 10 Sep 2017 07:00 )             33.1     09-10    136  |  94<L>  |  4<L>  ----------------------------<  173<H>  4.0   |  28  |  0.60    Ca    8.7      10 Sep 2017 07:00  Mg     1.8     09-10    TPro  6.2  /  Alb  3.3  /  TBili  0.4  /  DBili  x   /  AST  19  /  ALT  12  /  AlkPhos  38<L>  09-09          RADIOLOGY & ADDITIONAL STUDIES:

## 2017-09-11 RX ORDER — DEXTROSE MONOHYDRATE, SODIUM CHLORIDE, AND POTASSIUM CHLORIDE 50; .745; 4.5 G/1000ML; G/1000ML; G/1000ML
1000 INJECTION, SOLUTION INTRAVENOUS
Qty: 0 | Refills: 0 | Status: DISCONTINUED | OUTPATIENT
Start: 2017-09-11 | End: 2017-09-11

## 2017-09-11 RX ADMIN — GABAPENTIN 100 MILLIGRAM(S): 400 CAPSULE ORAL at 22:34

## 2017-09-11 RX ADMIN — Medication 650 MILLIGRAM(S): at 05:52

## 2017-09-11 RX ADMIN — LISINOPRIL 2.5 MILLIGRAM(S): 2.5 TABLET ORAL at 05:52

## 2017-09-11 RX ADMIN — Medication 650 MILLIGRAM(S): at 00:48

## 2017-09-11 RX ADMIN — Medication 2: at 13:44

## 2017-09-11 RX ADMIN — Medication 1: at 09:48

## 2017-09-11 RX ADMIN — ONDANSETRON 4 MILLIGRAM(S): 8 TABLET, FILM COATED ORAL at 22:34

## 2017-09-11 RX ADMIN — GABAPENTIN 100 MILLIGRAM(S): 400 CAPSULE ORAL at 05:52

## 2017-09-11 RX ADMIN — DEXTROSE MONOHYDRATE, SODIUM CHLORIDE, AND POTASSIUM CHLORIDE 75 MILLILITER(S): 50; .745; 4.5 INJECTION, SOLUTION INTRAVENOUS at 09:55

## 2017-09-11 RX ADMIN — Medication 1: at 19:14

## 2017-09-11 RX ADMIN — Medication 650 MILLIGRAM(S): at 06:22

## 2017-09-11 RX ADMIN — Medication 650 MILLIGRAM(S): at 14:00

## 2017-09-11 RX ADMIN — GABAPENTIN 100 MILLIGRAM(S): 400 CAPSULE ORAL at 13:44

## 2017-09-11 RX ADMIN — Medication 650 MILLIGRAM(S): at 13:44

## 2017-09-11 RX ADMIN — Medication 650 MILLIGRAM(S): at 00:18

## 2017-09-11 RX ADMIN — ENOXAPARIN SODIUM 40 MILLIGRAM(S): 100 INJECTION SUBCUTANEOUS at 13:44

## 2017-09-11 RX ADMIN — TAMSULOSIN HYDROCHLORIDE 0.4 MILLIGRAM(S): 0.4 CAPSULE ORAL at 22:34

## 2017-09-11 NOTE — PROGRESS NOTE ADULT - ASSESSMENT
EKG - not in chart     a/p     1) Ischemic colitis - s/p gray procedure doing well, no cp no SOB    2) HTN - lisinopril 2.5mg consider increasing to 5mg daily    3) DM2 - on insulin

## 2017-09-11 NOTE — PROGRESS NOTE ADULT - PROBLEM SELECTOR PLAN 1
symptoms improving per pt, tolerating PO intake. CTA a/p did not show any occlusive disease, but confirmed severe rectosigmoid colitis. GI following, appreciate recs.  - s/p flex sig yesterday results noted, f/u path results  -pain control tylenol, IV morphine prn  - will obtain sample for C. diff, stool cultures and O&P as per GI recs, pt not yet had a bm so no stool studies done yet  colorectal sx consult per GI
symptoms improving per pt, tolerating PO intake. however with increasing leukocytosis, will discuss with GI about abx, await sx consult, CTA a/p did not show any occlusive disease, but confirmed severe rectosigmoid colitis. GI following, appreciate recs.  - s/p flex sig on 8/30 results noted,  path results reviewed will discuss with GI  -pain control tylenol, IV morphine prn  -  C. diff neg, f/u stool cultures and O&P as per GI recs,   colorectal sx consult per GI
-ARBF  -multimodal pain control  -OOB/Ambulate with assistance   -Will continue with CLD and ADAT pending GI function.
-Awaiting stool  -multimodal pain control  -OOB/Ambulate with assistance   -Will continue with CLD and ADAT pending GI function.
CTA a/p did not show any occlusive disease, but confirmed severe rectosigmoid colitis. GI following, appreciate recs.  - f/u with GI  -pain control tylenol, IV morphine prn  - advance diet as tolerated  - discuss with patient if he is willing to consider surgical options given ongoing symptoms
Patient does not want surgery and came to [facility] for a second opinion regarding non surgical options. Lactate normal. Pt not hypotensive, BUN is < 20, WBC < 15. Unclear etiology: CTA a/p did not show any occlusive disease however do not have reports (at least I couldn't find them in chart), doesn't seem to have had a CT angio.  LDH wnl.  -GI consult pending  -IVF for colonic perfusion  -pt was tolerating a clear liquid diet-will continue for now  -zosyn q8, d/w GI if needs to stay on  -monitor leukocytosis  -will eventually need a surg eval, had been declining surgery at OSH (presuming it was recommended)  -obtain OSH c-scope and CT reports, path as well   -upload CD to PACS in am  -pain control tylenol, IV morphine prn
Still symptomatic but tolerating PO intake. CTA a/p did not show any occlusive disease, but confirmed severe rectosigmoid colitis. GI following, appreciate recs.  - plan for flex sigmoidoscopy as per GI  -pain control tylenol, IV morphine prn  - will obtain sample for C. diff, stool cultures and O&P as per GI recs
symptoms improving per pt, tolerating PO intake. CTA a/p did not show any occlusive disease, but confirmed severe rectosigmoid colitis. GI following, appreciate recs.  - s/p flex sig on 8/30 results noted,  path results reviewed   -pain control tylenol, IV morphine prn  -  C. diff neg, f/u stool cultures and O&P neg   colorectal sx consulted. No sx intervention at this time.   c/w cipro / flagyl, will d/w GI regarding Abx as pt likely had cipro/flagyl for >14 days   pt with abdominal pain and fever, sx recommends to downgrade diet to clears, GI f/u requested    Pt with ischemic colitis   , no cardiac w/u as per pt and wife in the past,  - cardiology eval noted, if no plan for sx then outpt TTE and stress
symptoms improving per pt, tolerating PO intake. CTA a/p did not show any occlusive disease, but confirmed severe rectosigmoid colitis. GI following, appreciate recs.  - s/p flex sig on 8/30 results noted,  path results reviewed   -pain control tylenol, IV morphine prn  -  C. diff neg, f/u stool cultures and O&P neg   colorectal sx consulted. No sx intervention at this time. c/w cipro / flagyl
symptoms improving per pt, tolerating PO intake. CTA a/p did not show any occlusive disease, but confirmed severe rectosigmoid colitis. GI following, appreciate recs.  - s/p flex sig on 8/30 results noted,  path results reviewed   -pain control tylenol, IV morphine prn  -  C. diff neg, f/u stool cultures and O&P neg   colorectal sx consulted. No sx intervention at this time. c/w cipro / flagyl
symptoms improving per pt, tolerating PO intake. CTA a/p did not show any occlusive disease, but confirmed severe rectosigmoid colitis. GI following, appreciate recs.  - s/p flex sig on 8/30 results noted,  path results reviewed   -pain control tylenol, IV morphine prn  -  C. diff neg, f/u stool cultures and O&P neg   colorectal sx consulted. No sx intervention at this time. c/w cipro / flagyl   will advance diet as tolerated  Pt with ischemic colitis , no cardiac w/u as per pt and wife in the past,  will request cardiology eval
symptoms persists  It is concerned that conservation management is not working  c/w cipro / flagyl  Plan for OR tmr
symptoms persists  It is concerned that conservation management is not working  c/w cipro / flagyl  Plan for OR today
symptoms persists per pt, tolerating PO intake.   It is concerned that conservation management is not working  colorectal sx consulted. Possible sx intervention   c/w cipro / flagyl
symptoms persists per pt, tolerating PO intake.   It is concerned that conservation management is not working  colorectal sx consulted. Possible sx intervention this week  c/w cipro / flagyl

## 2017-09-11 NOTE — PROGRESS NOTE ADULT - SUBJECTIVE AND OBJECTIVE BOX
INTERVAL HISTORY:  	  MEDICATIONS:  tamsulosin 0.4 milliGRAM(s) Oral at bedtime  enoxaparin Injectable 40 milliGRAM(s) SubCutaneous daily  lisinopril 2.5 milliGRAM(s) Oral daily        gabapentin 100 milliGRAM(s) Oral three times a day  ondansetron Injectable 4 milliGRAM(s) IV Push every 6 hours PRN  ketorolac   Injectable 15 milliGRAM(s) IV Push every 8 hours PRN  oxyCODONE    IR 5 milliGRAM(s) Oral every 3 hours PRN  acetaminophen   Tablet. 650 milliGRAM(s) Oral every 6 hours  oxyCODONE    IR 10 milliGRAM(s) Oral every 6 hours PRN      dextrose Gel 1 Dose(s) Oral once PRN  dextrose 50% Injectable 12.5 Gram(s) IV Push once  dextrose 50% Injectable 25 Gram(s) IV Push once  dextrose 50% Injectable 25 Gram(s) IV Push once  glucagon  Injectable 1 milliGRAM(s) IntraMuscular once PRN  insulin lispro (HumaLOG) corrective regimen sliding scale   SubCutaneous three times a day before meals  insulin lispro (HumaLOG) corrective regimen sliding scale   SubCutaneous at bedtime    dextrose 5%. 1000 milliLiter(s) IV Continuous <Continuous>  vitamin A &amp; D Ointment 1 Application(s) Topical every 3 hours PRN  dextrose 5% + sodium chloride 0.45% with potassium chloride 20 mEq/L 1000 milliLiter(s) IV Continuous <Continuous>      PHYSICAL EXAM:  T(C): 36.3 (09-11-17 @ 10:07), Max: 37.3 (09-10-17 @ 22:36)  HR: 89 (09-11-17 @ 10:07) (89 - 105)  BP: 151/88 (09-11-17 @ 10:07) (131/83 - 152/90)  RR: 16 (09-11-17 @ 10:07) (16 - 18)  SpO2: 100% (09-11-17 @ 10:07) (97% - 100%)  Wt(kg): --  I&O's Summary    10 Sep 2017 07:01  -  11 Sep 2017 07:00  --------------------------------------------------------  IN: 2660 mL / OUT: 2300 mL / NET: 360 mL    11 Sep 2017 07:01  -  11 Sep 2017 14:40  --------------------------------------------------------  IN: 2100 mL / OUT: 700 mL / NET: 1400 mL          Appearance: Normal	  HEENT:   Normal oral mucosa, PERRL, EOMI	  Cardiovascular: Normal S1 S2, No JVD, No murmurs, No edema  Respiratory: Lungs clear to auscultation	  Gastrointestinal:  Soft, Non-tender, + BS	  Extremities: Normal range of motion, No clubbing, cyanosis or edema                                    11.3   10.14 )-----------( 445      ( 10 Sep 2017 07:00 )             33.1     09-10    136  |  94<L>  |  4<L>  ----------------------------<  173<H>  4.0   |  28  |  0.60    Ca    8.7      10 Sep 2017 07:00  Mg     1.8     09-10      proBNP:   Lipid Profile:   HgA1c:   TSH: INTERVAL HISTORY: pt seen in am comfortable, not in distress, no chest pains  	  MEDICATIONS:  tamsulosin 0.4 milliGRAM(s) Oral at bedtime  enoxaparin Injectable 40 milliGRAM(s) SubCutaneous daily  lisinopril 2.5 milliGRAM(s) Oral daily  gabapentin 100 milliGRAM(s) Oral three times a day  ondansetron Injectable 4 milliGRAM(s) IV Push every 6 hours PRN  ketorolac   Injectable 15 milliGRAM(s) IV Push every 8 hours PRN  oxyCODONE    IR 5 milliGRAM(s) Oral every 3 hours PRN  acetaminophen   Tablet. 650 milliGRAM(s) Oral every 6 hours  oxyCODONE    IR 10 milliGRAM(s) Oral every 6 hours PRN      dextrose Gel 1 Dose(s) Oral once PRN  dextrose 50% Injectable 12.5 Gram(s) IV Push once  dextrose 50% Injectable 25 Gram(s) IV Push once  dextrose 50% Injectable 25 Gram(s) IV Push once  glucagon  Injectable 1 milliGRAM(s) IntraMuscular once PRN  insulin lispro (HumaLOG) corrective regimen sliding scale   SubCutaneous three times a day before meals  insulin lispro (HumaLOG) corrective regimen sliding scale   SubCutaneous at bedtime    dextrose 5%. 1000 milliLiter(s) IV Continuous <Continuous>  vitamin A &amp; D Ointment 1 Application(s) Topical every 3 hours PRN  dextrose 5% + sodium chloride 0.45% with potassium chloride 20 mEq/L 1000 milliLiter(s) IV Continuous <Continuous>      PHYSICAL EXAM:  T(C): 36.3 (09-11-17 @ 10:07), Max: 37.3 (09-10-17 @ 22:36)  HR: 89 (09-11-17 @ 10:07) (89 - 105)  BP: 151/88 (09-11-17 @ 10:07) (131/83 - 152/90)  RR: 16 (09-11-17 @ 10:07) (16 - 18)  SpO2: 100% (09-11-17 @ 10:07) (97% - 100%)  Wt(kg): --  I&O's Summary    10 Sep 2017 07:01  -  11 Sep 2017 07:00  --------------------------------------------------------  IN: 2660 mL / OUT: 2300 mL / NET: 360 mL    11 Sep 2017 07:01  -  11 Sep 2017 14:40  --------------------------------------------------------  IN: 2100 mL / OUT: 700 mL / NET: 1400 mL          Appearance: Normal	  HEENT:   Normal oral mucosa, PERRL, EOMI	  Cardiovascular: Normal S1 S2, No JVD, No murmurs, No edema  Respiratory: Lungs clear to auscultation	  Gastrointestinal:  s/p ostomy  Extremities: Normal range of motion, No clubbing, cyanosis or edema                                    11.3   10.14 )-----------( 445      ( 10 Sep 2017 07:00 )             33.1     09-10    136  |  94<L>  |  4<L>  ----------------------------<  173<H>  4.0   |  28  |  0.60    Ca    8.7      10 Sep 2017 07:00  Mg     1.8     09-10      proBNP:   Lipid Profile:   HgA1c:   TSH:

## 2017-09-11 NOTE — PROVIDER CONTACT NOTE (OTHER) - NAME OF MD/NP/PA/DO NOTIFIED:
Dr Morrow
Dr Morrow
JUN CHAUDHRY MD ([id number], B team [id number])
Mila CAIN
Rosetta Zimmerman NP
SATISH WALDRON MD ([id number], B team [id number])
Ifrah Judd, NP, ADS
Shanna Arnold NP
SATISH WALDRON MD ([id number], B team [id number])

## 2017-09-11 NOTE — PROVIDER CONTACT NOTE (OTHER) - RECOMMENDATIONS
Fluid bolus
MD notified. Zofran?
Provide patient with Tylenol
Blood cultures, UA to be ordered.
MD notified and made aware.
MD notified. Zofran?
Provider will look into it and place appropriate orders.
Will give a one time dose of simethicone to see if that aids in the discomfort.

## 2017-09-11 NOTE — PROVIDER CONTACT NOTE (OTHER) - DATE AND TIME:
04-Sep-2017 05:44
30-Aug-2017 18:14
11-Sep-2017 22:28
03-Sep-2017 17:20
09-Sep-2017 10:00
09-Sep-2017 14:00
11-Sep-2017 02:25
11-Sep-2017 22:12
31-Aug-2017 08:50

## 2017-09-11 NOTE — PROVIDER CONTACT NOTE (OTHER) - ASSESSMENT
Patient nauseous, no vomiting.
  Patient asymptomatic. Patient denies SOB, chest pain, dizziness or palpitation.
Fingerstick 206
Patient complaining of a gas like pain in lower abdomen, states "it feels like it did before the procedure." Patient states he does not want any pain medication.
Patient nauseous, no vomiting.
Patient temp 100.7. Tylenol given.
Patient A +O x's 4. temperature 100.3 oral

## 2017-09-11 NOTE — PROVIDER CONTACT NOTE (OTHER) - BACKGROUND
S/P Brody's procedure
Hartmanns procedure yesterday
Mel yesterday
Patient admitted with Noninfectious Gastroenteritis
Patient admitted with noninfectious gastroenteritis
Patient admitted with noninfectious gastroenteritis, diabetic on metformin at home
S/P Brody's procedure
S/P Brody's procedure
Patient had temperature on 9/3. blood cultures were sent

## 2017-09-11 NOTE — PROGRESS NOTE ADULT - PROBLEM SELECTOR PROBLEM 1
Ischemic colitis
Ischemic colitis
Attention to colostomy
Attention to colostomy
Ischemic colitis

## 2017-09-11 NOTE — PROVIDER CONTACT NOTE (OTHER) - ACTION/TREATMENT ORDERED:
retake temperature in 45 minutes and places cold compresses around patients body.
Will continue to assess
Waiting for orders
Zofran administered. Will continue to monitor.
Blood to be drawn, urine will be sent when patient voids.
MD reassessed patient. Zofran administered. Will continue to monitor.
No action take at this moment. MD recommended to monitor. RN will continue to monitor.
Will administer simethicone
Will wait for orders and follow up with provider.

## 2017-09-11 NOTE — PROVIDER CONTACT NOTE (OTHER) - REASON
Elevated heart rate
Heart rate of 122
Patient feeling nauseous
Patient feeling nauseous.
Patient has temp
fingerstick elevated
tachycardia & temp of 99.4
Patient c/o gas discomfort
Patient has temperature of 100.3

## 2017-09-11 NOTE — PROVIDER CONTACT NOTE (OTHER) - SITUATION
Patient feeling nauseous.
Elevated heart rate
Heart rate was 122, denies chest pain or palpitations
Informed MD that patient HR was 130 & temp was 99.4. Denies chest pain palpitations or SOB.
Patient complaining of gas discomfort in abdomen
Patient feeling nauseous.
Patient had fingerstick done this morning, slightly elevated, not on sliding scale
Patient wife stated patient felt warm, temp taken.
Patient has temperature of 100.3 F. Patient cannot get Tylenol until temperature has reached 100.4 F

## 2017-09-11 NOTE — PROGRESS NOTE ADULT - SUBJECTIVE AND OBJECTIVE BOX
COLORECTAL SURGERY DAILY PROGRESS NOTE        Subjective: Pain controlled, gas within ostomy appliance. Afebrile.       Objective: No events overnight, patient has been ambulating, has ostomy gas, no nausea or vomiting    MEDICATIONS  (STANDING):  tamsulosin 0.4 milliGRAM(s) Oral at bedtime  dextrose 5%. 1000 milliLiter(s) (75 mL/Hr) IV Continuous <Continuous>  dextrose 50% Injectable 12.5 Gram(s) IV Push once  dextrose 50% Injectable 25 Gram(s) IV Push once  dextrose 50% Injectable 25 Gram(s) IV Push once  insulin lispro (HumaLOG) corrective regimen sliding scale   SubCutaneous three times a day before meals  insulin lispro (HumaLOG) corrective regimen sliding scale   SubCutaneous at bedtime  gabapentin 100 milliGRAM(s) Oral three times a day  enoxaparin Injectable 40 milliGRAM(s) SubCutaneous daily  acetaminophen   Tablet. 650 milliGRAM(s) Oral every 6 hours  lisinopril 2.5 milliGRAM(s) Oral daily  dextrose 5% + sodium chloride 0.45% with potassium chloride 20 mEq/L 1000 milliLiter(s) (75 mL/Hr) IV Continuous <Continuous>    MEDICATIONS  (PRN):  dextrose Gel 1 Dose(s) Oral once PRN Blood Glucose LESS THAN 70 milliGRAM(s)/deciliter  glucagon  Injectable 1 milliGRAM(s) IntraMuscular once PRN Glucose LESS THAN 70 milligrams/deciliter  vitamin A &amp; D Ointment 1 Application(s) Topical every 3 hours PRN skin irritation  ondansetron Injectable 4 milliGRAM(s) IV Push every 6 hours PRN Nausea  ketorolac   Injectable 15 milliGRAM(s) IV Push every 8 hours PRN Moderate Pain (4 - 6)  oxyCODONE    IR 5 milliGRAM(s) Oral every 3 hours PRN pain  oxyCODONE    IR 10 milliGRAM(s) Oral every 6 hours PRN Severe Pain (7 - 10)      Vital Signs Last 24 Hrs  T(C): 36.6 (09-11-17 @ 14:52), Max: 37.3 (09-10-17 @ 22:36)  HR: 85 (09-11-17 @ 14:52) (85 - 105)  BP: 151/90 (09-11-17 @ 14:52) (131/83 - 152/90)  RR: 17 (09-11-17 @ 14:52) (16 - 18)  SpO2: 99% (09-11-17 @ 14:52) (97% - 100%)  Wt(kg): --  09-10 @ 07:01  -  09-11 @ 07:00  --------------------------------------------------------  IN:    lactated ringers.: 2200 mL    Oral Fluid: 460 mL  Total IN: 2660 mL    OUT:    Colostomy: 225 mL    Voided: 2075 mL  Total OUT: 2300 mL    Total NET: 360 mL      09-11 @ 07:01  -  09-11 @ 15:15  --------------------------------------------------------  IN:    dextrose 5% + sodium chloride 0.45% with potassium chloride 20 mEq/L: 300 mL    lactated ringers.: 200 mL    Oral Fluid: 1600 mL  Total IN: 2100 mL    OUT:    Voided: 950 mL  Total OUT: 950 mL    Total NET: 1150 mL      General: WN/WD NAD  Neurology: A&Ox3, nonfocal, BAINS x 4  Abdominal: Softly distended, minimal incisional tenderness, gas within ostomy appliance.   MSK: No edema, + peripheral pulses, FROM all 4 extremity  Incisions: intact, no erythema or drainage    LABS:                         11.3   10.14 )-----------( 445      ( 10 Sep 2017 07:00 )             33.1     09-10    136  |  94<L>  |  4<L>  ----------------------------<  173<H>  4.0   |  28  |  0.60    Ca    8.7      10 Sep 2017 07:00  Mg     1.8     09-10                RADIOLOGY, EKG & ADDITIONAL TESTS: Reviewed.

## 2017-09-12 LAB
BUN SERPL-MCNC: 4 MG/DL — LOW (ref 7–23)
CALCIUM SERPL-MCNC: 8.8 MG/DL — SIGNIFICANT CHANGE UP (ref 8.4–10.5)
CHLORIDE SERPL-SCNC: 93 MMOL/L — LOW (ref 98–107)
CO2 SERPL-SCNC: 27 MMOL/L — SIGNIFICANT CHANGE UP (ref 22–31)
CREAT SERPL-MCNC: 0.7 MG/DL — SIGNIFICANT CHANGE UP (ref 0.5–1.3)
GLUCOSE SERPL-MCNC: 146 MG/DL — HIGH (ref 70–99)
HCT VFR BLD CALC: 33.1 % — LOW (ref 39–50)
HGB BLD-MCNC: 11.2 G/DL — LOW (ref 13–17)
MCHC RBC-ENTMCNC: 28.7 PG — SIGNIFICANT CHANGE UP (ref 27–34)
MCHC RBC-ENTMCNC: 33.8 % — SIGNIFICANT CHANGE UP (ref 32–36)
MCV RBC AUTO: 84.9 FL — SIGNIFICANT CHANGE UP (ref 80–100)
NRBC # FLD: 0 — SIGNIFICANT CHANGE UP
PLATELET # BLD AUTO: 522 K/UL — HIGH (ref 150–400)
PMV BLD: 8.6 FL — SIGNIFICANT CHANGE UP (ref 7–13)
POTASSIUM SERPL-MCNC: 4.4 MMOL/L — SIGNIFICANT CHANGE UP (ref 3.5–5.3)
POTASSIUM SERPL-SCNC: 4.4 MMOL/L — SIGNIFICANT CHANGE UP (ref 3.5–5.3)
RBC # BLD: 3.9 M/UL — LOW (ref 4.2–5.8)
RBC # FLD: 13.7 % — SIGNIFICANT CHANGE UP (ref 10.3–14.5)
SODIUM SERPL-SCNC: 135 MMOL/L — SIGNIFICANT CHANGE UP (ref 135–145)
WBC # BLD: 7.2 K/UL — SIGNIFICANT CHANGE UP (ref 3.8–10.5)
WBC # FLD AUTO: 7.2 K/UL — SIGNIFICANT CHANGE UP (ref 3.8–10.5)

## 2017-09-12 RX ORDER — SODIUM CHLORIDE 9 MG/ML
3 INJECTION INTRAMUSCULAR; INTRAVENOUS; SUBCUTANEOUS EVERY 8 HOURS
Qty: 0 | Refills: 0 | Status: DISCONTINUED | OUTPATIENT
Start: 2017-09-12 | End: 2017-09-14

## 2017-09-12 RX ADMIN — Medication 650 MILLIGRAM(S): at 12:38

## 2017-09-12 RX ADMIN — LISINOPRIL 2.5 MILLIGRAM(S): 2.5 TABLET ORAL at 05:55

## 2017-09-12 RX ADMIN — Medication: at 17:50

## 2017-09-12 RX ADMIN — Medication 650 MILLIGRAM(S): at 17:49

## 2017-09-12 RX ADMIN — TAMSULOSIN HYDROCHLORIDE 0.4 MILLIGRAM(S): 0.4 CAPSULE ORAL at 23:16

## 2017-09-12 RX ADMIN — SODIUM CHLORIDE 3 MILLILITER(S): 9 INJECTION INTRAMUSCULAR; INTRAVENOUS; SUBCUTANEOUS at 05:42

## 2017-09-12 RX ADMIN — GABAPENTIN 100 MILLIGRAM(S): 400 CAPSULE ORAL at 23:16

## 2017-09-12 RX ADMIN — GABAPENTIN 100 MILLIGRAM(S): 400 CAPSULE ORAL at 05:55

## 2017-09-12 RX ADMIN — Medication 2: at 12:41

## 2017-09-12 RX ADMIN — ENOXAPARIN SODIUM 40 MILLIGRAM(S): 100 INJECTION SUBCUTANEOUS at 12:38

## 2017-09-12 RX ADMIN — Medication 650 MILLIGRAM(S): at 13:47

## 2017-09-12 RX ADMIN — Medication 650 MILLIGRAM(S): at 18:19

## 2017-09-12 RX ADMIN — GABAPENTIN 100 MILLIGRAM(S): 400 CAPSULE ORAL at 14:47

## 2017-09-12 RX ADMIN — SODIUM CHLORIDE 3 MILLILITER(S): 9 INJECTION INTRAMUSCULAR; INTRAVENOUS; SUBCUTANEOUS at 14:48

## 2017-09-12 RX ADMIN — SODIUM CHLORIDE 3 MILLILITER(S): 9 INJECTION INTRAMUSCULAR; INTRAVENOUS; SUBCUTANEOUS at 22:16

## 2017-09-12 NOTE — PROGRESS NOTE ADULT - ASSESSMENT
EKG - Normal sinus rhythm    a/p     1) Ischemic colitis - s/p gray procedure doing well, no cp no SOB, some nausea    2) HTN - lisinopril 2.5mg     3) DM2 - on insulin

## 2017-09-12 NOTE — PHYSICAL THERAPY INITIAL EVALUATION ADULT - CRITERIA FOR SKILLED THERAPEUTIC INTERVENTIONS
Patient refused any PT intervention during hospital stay and patient is able to ambulate with supervision without AD and occasionally holding on to the side rails in the hallway , hence Restorative PT is not indicated .

## 2017-09-12 NOTE — PROGRESS NOTE ADULT - SUBJECTIVE AND OBJECTIVE BOX
INTERVAL HISTORY: pt is lying in bed comfortable, not in distress, some nausea  	  MEDICATIONS:  tamsulosin 0.4 milliGRAM(s) Oral at bedtime  enoxaparin Injectable 40 milliGRAM(s) SubCutaneous daily  lisinopril 2.5 milliGRAM(s) Oral daily        gabapentin 100 milliGRAM(s) Oral three times a day  ondansetron Injectable 4 milliGRAM(s) IV Push every 6 hours PRN  ketorolac   Injectable 15 milliGRAM(s) IV Push every 8 hours PRN  oxyCODONE    IR 5 milliGRAM(s) Oral every 3 hours PRN  acetaminophen   Tablet. 650 milliGRAM(s) Oral every 6 hours  oxyCODONE    IR 10 milliGRAM(s) Oral every 6 hours PRN      dextrose Gel 1 Dose(s) Oral once PRN  dextrose 50% Injectable 12.5 Gram(s) IV Push once  dextrose 50% Injectable 25 Gram(s) IV Push once  dextrose 50% Injectable 25 Gram(s) IV Push once  glucagon  Injectable 1 milliGRAM(s) IntraMuscular once PRN  insulin lispro (HumaLOG) corrective regimen sliding scale   SubCutaneous three times a day before meals  insulin lispro (HumaLOG) corrective regimen sliding scale   SubCutaneous at bedtime    vitamin A &amp; D Ointment 1 Application(s) Topical every 3 hours PRN      PHYSICAL EXAM:  T(C): 36.9 (09-12-17 @ 09:57), Max: 37 (09-12-17 @ 05:53)  HR: 95 (09-12-17 @ 09:57) (74 - 95)  BP: 105/69 (09-12-17 @ 09:57) (105/69 - 155/87)  RR: 18 (09-12-17 @ 09:57) (16 - 18)  SpO2: 98% (09-12-17 @ 09:57) (97% - 100%)  Wt(kg): --  I&O's Summary    11 Sep 2017 07:01  -  12 Sep 2017 07:00  --------------------------------------------------------  IN: 2565 mL / OUT: 3125 mL / NET: -560 mL    12 Sep 2017 07:01  -  12 Sep 2017 11:41  --------------------------------------------------------  IN: 0 mL / OUT: 450 mL / NET: -450 mL          Appearance: Normal	  HEENT:   Normal oral mucosa, PERRL, EOMI	  Cardiovascular: Normal S1 S2, No JVD, No murmurs, No edema  Respiratory: Lungs clear to auscultation	  Gastrointestinal:  s/p colostomy  Extremities: Normal range of motion, No clubbing, cyanosis or edema                                    11.2   7.20  )-----------( 522      ( 12 Sep 2017 06:00 )             33.1     09-12    135  |  93<L>  |  4<L>  ----------------------------<  146<H>  4.4   |  27  |  0.70    Ca    8.8      12 Sep 2017 06:00      proBNP:   Lipid Profile:   HgA1c:   TSH:

## 2017-09-13 RX ORDER — ZOLPIDEM TARTRATE 10 MG/1
5 TABLET ORAL AT BEDTIME
Qty: 0 | Refills: 0 | Status: DISCONTINUED | OUTPATIENT
Start: 2017-09-13 | End: 2017-09-13

## 2017-09-13 RX ORDER — OXYCODONE HYDROCHLORIDE 5 MG/1
1 TABLET ORAL
Qty: 16 | Refills: 0 | OUTPATIENT
Start: 2017-09-13

## 2017-09-13 RX ORDER — GABAPENTIN 400 MG/1
1 CAPSULE ORAL
Qty: 270 | Refills: 0 | OUTPATIENT
Start: 2017-09-13 | End: 2017-12-12

## 2017-09-13 RX ORDER — TAMSULOSIN HYDROCHLORIDE 0.4 MG/1
1 CAPSULE ORAL
Qty: 30 | Refills: 0 | OUTPATIENT
Start: 2017-09-13 | End: 2017-10-13

## 2017-09-13 RX ORDER — LISINOPRIL 2.5 MG/1
1 TABLET ORAL
Qty: 30 | Refills: 0 | OUTPATIENT
Start: 2017-09-13 | End: 2017-10-13

## 2017-09-13 RX ORDER — ACETAMINOPHEN 500 MG
2 TABLET ORAL
Qty: 0 | Refills: 0 | COMMUNITY
Start: 2017-09-13

## 2017-09-13 RX ADMIN — Medication 650 MILLIGRAM(S): at 06:21

## 2017-09-13 RX ADMIN — Medication 650 MILLIGRAM(S): at 00:13

## 2017-09-13 RX ADMIN — TAMSULOSIN HYDROCHLORIDE 0.4 MILLIGRAM(S): 0.4 CAPSULE ORAL at 23:04

## 2017-09-13 RX ADMIN — GABAPENTIN 100 MILLIGRAM(S): 400 CAPSULE ORAL at 05:51

## 2017-09-13 RX ADMIN — SODIUM CHLORIDE 3 MILLILITER(S): 9 INJECTION INTRAMUSCULAR; INTRAVENOUS; SUBCUTANEOUS at 23:04

## 2017-09-13 RX ADMIN — Medication 650 MILLIGRAM(S): at 05:51

## 2017-09-13 RX ADMIN — GABAPENTIN 100 MILLIGRAM(S): 400 CAPSULE ORAL at 15:10

## 2017-09-13 RX ADMIN — Medication 2: at 12:27

## 2017-09-13 RX ADMIN — ENOXAPARIN SODIUM 40 MILLIGRAM(S): 100 INJECTION SUBCUTANEOUS at 12:28

## 2017-09-13 RX ADMIN — Medication 650 MILLIGRAM(S): at 00:43

## 2017-09-13 RX ADMIN — SODIUM CHLORIDE 3 MILLILITER(S): 9 INJECTION INTRAMUSCULAR; INTRAVENOUS; SUBCUTANEOUS at 14:05

## 2017-09-13 RX ADMIN — LISINOPRIL 2.5 MILLIGRAM(S): 2.5 TABLET ORAL at 05:51

## 2017-09-13 RX ADMIN — Medication 1: at 17:44

## 2017-09-13 RX ADMIN — SODIUM CHLORIDE 3 MILLILITER(S): 9 INJECTION INTRAMUSCULAR; INTRAVENOUS; SUBCUTANEOUS at 05:50

## 2017-09-13 RX ADMIN — GABAPENTIN 100 MILLIGRAM(S): 400 CAPSULE ORAL at 23:05

## 2017-09-13 NOTE — PROGRESS NOTE ADULT - ASSESSMENT
EKG - Normal sinus rhythm    a/p     1) Ischemic colitis - s/p gray procedure doing well, no cp no SOB, no nausea    2) HTN - lisinopril 2.5mg     3) DM2 - on insulin

## 2017-09-13 NOTE — PROGRESS NOTE ADULT - SUBJECTIVE AND OBJECTIVE BOX
INTERVAL HISTORY: pt is lying in bed comfortable, not in distress, no cp, no sob, for d/c today  	  MEDICATIONS:  tamsulosin 0.4 milliGRAM(s) Oral at bedtime  enoxaparin Injectable 40 milliGRAM(s) SubCutaneous daily  lisinopril 2.5 milliGRAM(s) Oral daily        gabapentin 100 milliGRAM(s) Oral three times a day  ondansetron Injectable 4 milliGRAM(s) IV Push every 6 hours PRN  ketorolac   Injectable 15 milliGRAM(s) IV Push every 8 hours PRN  oxyCODONE    IR 5 milliGRAM(s) Oral every 3 hours PRN  acetaminophen   Tablet. 650 milliGRAM(s) Oral every 6 hours  oxyCODONE    IR 10 milliGRAM(s) Oral every 6 hours PRN      dextrose Gel 1 Dose(s) Oral once PRN  dextrose 50% Injectable 12.5 Gram(s) IV Push once  dextrose 50% Injectable 25 Gram(s) IV Push once  dextrose 50% Injectable 25 Gram(s) IV Push once  glucagon  Injectable 1 milliGRAM(s) IntraMuscular once PRN  insulin lispro (HumaLOG) corrective regimen sliding scale   SubCutaneous three times a day before meals  insulin lispro (HumaLOG) corrective regimen sliding scale   SubCutaneous at bedtime    vitamin A &amp; D Ointment 1 Application(s) Topical every 3 hours PRN      PHYSICAL EXAM:  T(C): 36.6 (09-13-17 @ 10:40), Max: 37.2 (09-12-17 @ 18:00)  HR: 79 (09-13-17 @ 10:40) (72 - 90)  BP: 119/76 (09-13-17 @ 10:40) (109/72 - 135/76)  RR: 18 (09-13-17 @ 10:40) (18 - 19)  SpO2: 100% (09-13-17 @ 10:40) (100% - 100%)  Wt(kg): --  I&O's Summary    12 Sep 2017 07:01  -  13 Sep 2017 07:00  --------------------------------------------------------  IN: 640 mL / OUT: 2500 mL / NET: -1280 mL    13 Sep 2017 07:01  -  13 Sep 2017 12:55  --------------------------------------------------------  IN: 240 mL / OUT: 800 mL / NET: -560 mL          Appearance: Normal	  HEENT:   Normal oral mucosa, PERRL, EOMI	  Cardiovascular: Normal S1 S2, No JVD, No murmurs, No edema  Respiratory: Lungs clear to auscultation	  Gastrointestinal:  s/p colostomy  Extremities: Normal range of motion, No clubbing, cyanosis or edema                                    11.2   7.20  )-----------( 522      ( 12 Sep 2017 06:00 )             33.1     09-12    135  |  93<L>  |  4<L>  ----------------------------<  146<H>  4.4   |  27  |  0.70    Ca    8.8      12 Sep 2017 06:00      proBNP:   Lipid Profile:   HgA1c:   TSH:

## 2017-09-13 NOTE — PROGRESS NOTE ADULT - SUBJECTIVE AND OBJECTIVE BOX
Feeling better. tolerating diet.  Improving pain and diarrhea    swollen scrotom  abd soft inc intact stoma functioning              Objective:    MEDICATIONS  (STANDING):  tamsulosin 0.4 milliGRAM(s) Oral at bedtime  dextrose 50% Injectable 12.5 Gram(s) IV Push once  dextrose 50% Injectable 25 Gram(s) IV Push once  dextrose 50% Injectable 25 Gram(s) IV Push once  insulin lispro (HumaLOG) corrective regimen sliding scale   SubCutaneous three times a day before meals  insulin lispro (HumaLOG) corrective regimen sliding scale   SubCutaneous at bedtime  gabapentin 100 milliGRAM(s) Oral three times a day  enoxaparin Injectable 40 milliGRAM(s) SubCutaneous daily  acetaminophen   Tablet. 650 milliGRAM(s) Oral every 6 hours  lisinopril 2.5 milliGRAM(s) Oral daily  sodium chloride 0.9% lock flush 3 milliLiter(s) IV Push every 8 hours    MEDICATIONS  (PRN):  dextrose Gel 1 Dose(s) Oral once PRN Blood Glucose LESS THAN 70 milliGRAM(s)/deciliter  glucagon  Injectable 1 milliGRAM(s) IntraMuscular once PRN Glucose LESS THAN 70 milligrams/deciliter  vitamin A &amp; D Ointment 1 Application(s) Topical every 3 hours PRN skin irritation  ondansetron Injectable 4 milliGRAM(s) IV Push every 6 hours PRN Nausea  ketorolac   Injectable 15 milliGRAM(s) IV Push every 8 hours PRN Moderate Pain (4 - 6)  oxyCODONE    IR 5 milliGRAM(s) Oral every 3 hours PRN pain  oxyCODONE    IR 10 milliGRAM(s) Oral every 6 hours PRN Severe Pain (7 - 10)      Vital Signs Last 24 Hrs  T(C): 37.1 (13 Sep 2017 14:20), Max: 37.1 (13 Sep 2017 14:20)  T(F): 98.8 (13 Sep 2017 14:20), Max: 98.8 (13 Sep 2017 14:20)  HR: 80 (13 Sep 2017 14:20) (72 - 81)  BP: 128/76 (13 Sep 2017 14:20) (109/72 - 128/76)  BP(mean): --  RR: 16 (13 Sep 2017 14:20) (16 - 19)  SpO2: 98% (13 Sep 2017 14:20) (98% - 100%)    I&O's Detail    12 Sep 2017 07:01  -  13 Sep 2017 07:00  --------------------------------------------------------  IN:    Oral Fluid: 640 mL  Total IN: 640 mL    OUT:    Colostomy: 500 mL    Voided: 2000 mL  Total OUT: 2500 mL    Total NET: -1860 mL      13 Sep 2017 07:01  -  13 Sep 2017 18:33  --------------------------------------------------------  IN:    Oral Fluid: 240 mL  Total IN: 240 mL    OUT:    Colostomy: 50 mL    Voided: 1000 mL  Total OUT: 1050 mL    Total NET: -810 mL          Daily     Daily     LABS:                        11.2   7.20  )-----------( 522      ( 12 Sep 2017 06:00 )             33.1     09-12    135  |  93<L>  |  4<L>  ----------------------------<  146<H>  4.4   |  27  |  0.70    Ca    8.8      12 Sep 2017 06:00            RADIOLOGY & ADDITIONAL STUDIES:

## 2017-09-14 VITALS
SYSTOLIC BLOOD PRESSURE: 128 MMHG | HEART RATE: 90 BPM | DIASTOLIC BLOOD PRESSURE: 80 MMHG | RESPIRATION RATE: 90 BRPM | TEMPERATURE: 98 F

## 2017-09-14 RX ORDER — LANOLIN ALCOHOL/MO/W.PET/CERES
3 CREAM (GRAM) TOPICAL ONCE
Qty: 0 | Refills: 0 | Status: COMPLETED | OUTPATIENT
Start: 2017-09-14 | End: 2017-09-14

## 2017-09-14 RX ADMIN — Medication 3 MILLIGRAM(S): at 00:18

## 2017-09-14 RX ADMIN — ENOXAPARIN SODIUM 40 MILLIGRAM(S): 100 INJECTION SUBCUTANEOUS at 11:18

## 2017-09-14 RX ADMIN — Medication 650 MILLIGRAM(S): at 00:47

## 2017-09-14 RX ADMIN — SODIUM CHLORIDE 3 MILLILITER(S): 9 INJECTION INTRAMUSCULAR; INTRAVENOUS; SUBCUTANEOUS at 05:51

## 2017-09-14 RX ADMIN — Medication 650 MILLIGRAM(S): at 05:53

## 2017-09-14 RX ADMIN — LISINOPRIL 2.5 MILLIGRAM(S): 2.5 TABLET ORAL at 05:53

## 2017-09-14 RX ADMIN — Medication 1: at 09:14

## 2017-09-14 RX ADMIN — Medication 650 MILLIGRAM(S): at 00:17

## 2017-09-14 RX ADMIN — GABAPENTIN 100 MILLIGRAM(S): 400 CAPSULE ORAL at 05:53

## 2017-09-14 NOTE — PROGRESS NOTE ADULT - SUBJECTIVE AND OBJECTIVE BOX
INTERVAL HISTORY: pt is lying in bed comfortable, seen in am no cp no SOB  	  MEDICATIONS:  tamsulosin 0.4 milliGRAM(s) Oral at bedtime  enoxaparin Injectable 40 milliGRAM(s) SubCutaneous daily  lisinopril 2.5 milliGRAM(s) Oral daily  gabapentin 100 milliGRAM(s) Oral three times a day  ondansetron Injectable 4 milliGRAM(s) IV Push every 6 hours PRN  ketorolac   Injectable 15 milliGRAM(s) IV Push every 8 hours PRN  oxyCODONE    IR 5 milliGRAM(s) Oral every 3 hours PRN  acetaminophen   Tablet. 650 milliGRAM(s) Oral every 6 hours  oxyCODONE    IR 10 milliGRAM(s) Oral every 6 hours PRN  dextrose Gel 1 Dose(s) Oral once PRN  dextrose 50% Injectable 12.5 Gram(s) IV Push once  dextrose 50% Injectable 25 Gram(s) IV Push once  dextrose 50% Injectable 25 Gram(s) IV Push once  glucagon  Injectable 1 milliGRAM(s) IntraMuscular once PRN  insulin lispro (HumaLOG) corrective regimen sliding scale   SubCutaneous three times a day before meals  insulin lispro (HumaLOG) corrective regimen sliding scale   SubCutaneous at bedtime    vitamin A &amp; D Ointment 1 Application(s) Topical every 3 hours PRN      PHYSICAL EXAM:  T(C): 36.8 (09-14-17 @ 10:55), Max: 37.1 (09-13-17 @ 14:20)  HR: 90 (09-14-17 @ 10:55) (77 - 90)  BP: 128/80 (09-14-17 @ 10:55) (113/70 - 136/84)  RR: 90 (09-14-17 @ 10:55) (16 - 90)  SpO2: 99% (09-14-17 @ 05:47) (98% - 99%)  Wt(kg): --  I&O's Summary    13 Sep 2017 07:01  -  14 Sep 2017 07:00  --------------------------------------------------------  IN: 240 mL / OUT: 2200 mL / NET: -1960 mL          Appearance: Normal	  HEENT:   Normal oral mucosa, PERRL, EOMI	  Cardiovascular: Normal S1 S2, No JVD, No murmurs, No edema  Respiratory: Lungs clear to auscultation	  Gastrointestinal:  s/p colostomy  Extremities: Normal range of motion, No clubbing, cyanosis or edema                      proBNP:   Lipid Profile:   HgA1c:   TSH:

## 2017-09-14 NOTE — PROGRESS NOTE ADULT - PROVIDER SPECIALTY LIST ADULT
Cardiology
Colorectal Surgery
Gastroenterology
Hospitalist
Internal Medicine
Surgery
Gastroenterology
Colorectal Surgery
Cardiology
Cardiology

## 2017-09-16 LAB
BACTERIA BLD CULT: SIGNIFICANT CHANGE UP
BACTERIA BLD CULT: SIGNIFICANT CHANGE UP

## 2017-09-27 ENCOUNTER — APPOINTMENT (OUTPATIENT)
Dept: COLORECTAL SURGERY | Facility: CLINIC | Age: 62
End: 2017-09-27
Payer: MEDICAID

## 2017-09-27 ENCOUNTER — APPOINTMENT (OUTPATIENT)
Dept: COLORECTAL SURGERY | Facility: CLINIC | Age: 62
End: 2017-09-27

## 2017-09-27 VITALS
RESPIRATION RATE: 14 BRPM | OXYGEN SATURATION: 100 % | DIASTOLIC BLOOD PRESSURE: 80 MMHG | TEMPERATURE: 98.2 F | SYSTOLIC BLOOD PRESSURE: 136 MMHG | HEART RATE: 72 BPM

## 2017-09-27 VITALS — WEIGHT: 125 LBS | BODY MASS INDEX: 20.83 KG/M2 | HEIGHT: 65 IN

## 2017-09-27 DIAGNOSIS — Z78.9 OTHER SPECIFIED HEALTH STATUS: ICD-10-CM

## 2017-09-27 PROCEDURE — 99024 POSTOP FOLLOW-UP VISIT: CPT

## 2017-10-04 ENCOUNTER — APPOINTMENT (OUTPATIENT)
Dept: UROLOGY | Facility: CLINIC | Age: 62
End: 2017-10-04
Payer: MEDICAID

## 2017-10-04 VITALS
RESPIRATION RATE: 14 BRPM | HEART RATE: 65 BPM | BODY MASS INDEX: 21.53 KG/M2 | WEIGHT: 134 LBS | DIASTOLIC BLOOD PRESSURE: 70 MMHG | HEIGHT: 66 IN | TEMPERATURE: 98.2 F | SYSTOLIC BLOOD PRESSURE: 110 MMHG

## 2017-10-04 DIAGNOSIS — Z86.79 PERSONAL HISTORY OF OTHER DISEASES OF THE CIRCULATORY SYSTEM: ICD-10-CM

## 2017-10-04 DIAGNOSIS — N40.0 BENIGN PROSTATIC HYPERPLASIA WITHOUT LOWER URINARY TRACT SYMPMS: ICD-10-CM

## 2017-10-04 DIAGNOSIS — N40.1 BENIGN PROSTATIC HYPERPLASIA WITH LOWER URINARY TRACT SYMPMS: ICD-10-CM

## 2017-10-04 DIAGNOSIS — N13.8 BENIGN PROSTATIC HYPERPLASIA WITH LOWER URINARY TRACT SYMPMS: ICD-10-CM

## 2017-10-04 DIAGNOSIS — F15.90 OTHER STIMULANT USE, UNSPECIFIED, UNCOMPLICATED: ICD-10-CM

## 2017-10-04 DIAGNOSIS — Z86.69 PERSONAL HISTORY OF OTHER DISEASES OF THE NERVOUS SYSTEM AND SENSE ORGANS: ICD-10-CM

## 2017-10-04 DIAGNOSIS — N28.9 DISORDER OF KIDNEY AND URETER, UNSPECIFIED: ICD-10-CM

## 2017-10-04 DIAGNOSIS — Z86.39 PERSONAL HISTORY OF OTHER ENDOCRINE, NUTRITIONAL AND METABOLIC DISEASE: ICD-10-CM

## 2017-10-04 DIAGNOSIS — N20.0 CALCULUS OF KIDNEY: ICD-10-CM

## 2017-10-04 PROCEDURE — 99204 OFFICE O/P NEW MOD 45 MIN: CPT

## 2017-10-04 PROCEDURE — 51798 US URINE CAPACITY MEASURE: CPT

## 2017-10-27 ENCOUNTER — APPOINTMENT (OUTPATIENT)
Dept: COLORECTAL SURGERY | Facility: CLINIC | Age: 62
End: 2017-10-27
Payer: MEDICAID

## 2017-10-27 PROCEDURE — 99024 POSTOP FOLLOW-UP VISIT: CPT

## 2017-11-01 ENCOUNTER — APPOINTMENT (OUTPATIENT)
Dept: COLORECTAL SURGERY | Facility: CLINIC | Age: 62
End: 2017-11-01
Payer: MEDICAID

## 2017-11-01 PROCEDURE — 45330 DIAGNOSTIC SIGMOIDOSCOPY: CPT | Mod: 58

## 2018-01-26 ENCOUNTER — OUTPATIENT (OUTPATIENT)
Dept: OUTPATIENT SERVICES | Facility: HOSPITAL | Age: 63
LOS: 1 days | End: 2018-01-26
Payer: MEDICAID

## 2018-01-26 VITALS
WEIGHT: 152.56 LBS | TEMPERATURE: 98 F | HEIGHT: 60 IN | SYSTOLIC BLOOD PRESSURE: 129 MMHG | RESPIRATION RATE: 18 BRPM | DIASTOLIC BLOOD PRESSURE: 86 MMHG | HEART RATE: 78 BPM | OXYGEN SATURATION: 98 %

## 2018-01-26 DIAGNOSIS — Z01.818 ENCOUNTER FOR OTHER PREPROCEDURAL EXAMINATION: ICD-10-CM

## 2018-01-26 DIAGNOSIS — E11.9 TYPE 2 DIABETES MELLITUS WITHOUT COMPLICATIONS: ICD-10-CM

## 2018-01-26 DIAGNOSIS — K55.1 CHRONIC VASCULAR DISORDERS OF INTESTINE: ICD-10-CM

## 2018-01-26 DIAGNOSIS — K55.9 VASCULAR DISORDER OF INTESTINE, UNSPECIFIED: ICD-10-CM

## 2018-01-26 LAB
BLD GP AB SCN SERPL QL: NEGATIVE — SIGNIFICANT CHANGE UP
HCT VFR BLD CALC: 40.8 % — SIGNIFICANT CHANGE UP (ref 39–50)
HGB BLD-MCNC: 13.4 G/DL — SIGNIFICANT CHANGE UP (ref 13–17)
MCHC RBC-ENTMCNC: 28.7 PG — SIGNIFICANT CHANGE UP (ref 27–34)
MCHC RBC-ENTMCNC: 32.8 GM/DL — SIGNIFICANT CHANGE UP (ref 32–36)
MCV RBC AUTO: 87.4 FL — SIGNIFICANT CHANGE UP (ref 80–100)
PLATELET # BLD AUTO: 254 K/UL — SIGNIFICANT CHANGE UP (ref 150–400)
RBC # BLD: 4.67 M/UL — SIGNIFICANT CHANGE UP (ref 4.2–5.8)
RBC # FLD: 13.5 % — SIGNIFICANT CHANGE UP (ref 10.3–14.5)
RH IG SCN BLD-IMP: POSITIVE — SIGNIFICANT CHANGE UP
WBC # BLD: 5.36 K/UL — SIGNIFICANT CHANGE UP (ref 3.8–10.5)
WBC # FLD AUTO: 5.36 K/UL — SIGNIFICANT CHANGE UP (ref 3.8–10.5)

## 2018-01-26 PROCEDURE — 86850 RBC ANTIBODY SCREEN: CPT

## 2018-01-26 PROCEDURE — 83036 HEMOGLOBIN GLYCOSYLATED A1C: CPT

## 2018-01-26 PROCEDURE — G0463: CPT

## 2018-01-26 PROCEDURE — 86900 BLOOD TYPING SEROLOGIC ABO: CPT

## 2018-01-26 PROCEDURE — 85027 COMPLETE CBC AUTOMATED: CPT

## 2018-01-26 PROCEDURE — 80048 BASIC METABOLIC PNL TOTAL CA: CPT

## 2018-01-26 PROCEDURE — 86901 BLOOD TYPING SEROLOGIC RH(D): CPT

## 2018-01-26 RX ORDER — CEFOTETAN DISODIUM 1 G
2 VIAL (EA) INJECTION ONCE
Qty: 0 | Refills: 0 | Status: DISCONTINUED | OUTPATIENT
Start: 2018-02-01 | End: 2018-02-03

## 2018-01-26 RX ORDER — ASPIRIN/CALCIUM CARB/MAGNESIUM 324 MG
1 TABLET ORAL
Qty: 0 | Refills: 0 | COMMUNITY

## 2018-01-26 RX ORDER — METFORMIN HYDROCHLORIDE 850 MG/1
1 TABLET ORAL
Qty: 0 | Refills: 0 | COMMUNITY

## 2018-01-26 NOTE — H&P PST ADULT - HISTORY OF PRESENT ILLNESS
62 yr old male presents to PST for Robotic Colostomy Reversal on 2/1/18. PMH: BPH, HTN (now off meds), Kidney stones (S/P litho x2), DM2 (oral medication), and Colitis (S/P bowel resection w/ colostomy 9/8/17).  Pt denies chest pain, denies SOB, will see Cardiologist in AM tomorrow for clearance. Pt feels well today.

## 2018-01-26 NOTE — H&P PST ADULT - BP NONINVASIVE DIASTOLIC (MM HG)
Pt asleep breathing even and unlabored nos igns of any distress noted. Sitter in direct obs of pt. Will continue to   monitor.   86

## 2018-01-27 LAB
ANION GAP SERPL CALC-SCNC: 15 MMOL/L — SIGNIFICANT CHANGE UP (ref 5–17)
BUN SERPL-MCNC: 18 MG/DL — SIGNIFICANT CHANGE UP (ref 7–23)
CALCIUM SERPL-MCNC: 9.6 MG/DL — SIGNIFICANT CHANGE UP (ref 8.4–10.5)
CHLORIDE SERPL-SCNC: 99 MMOL/L — SIGNIFICANT CHANGE UP (ref 96–108)
CO2 SERPL-SCNC: 25 MMOL/L — SIGNIFICANT CHANGE UP (ref 22–31)
CREAT SERPL-MCNC: 0.59 MG/DL — SIGNIFICANT CHANGE UP (ref 0.5–1.3)
GLUCOSE SERPL-MCNC: 113 MG/DL — HIGH (ref 70–99)
HBA1C BLD-MCNC: 6.2 % — HIGH (ref 4–5.6)
POTASSIUM SERPL-MCNC: 3.9 MMOL/L — SIGNIFICANT CHANGE UP (ref 3.5–5.3)
POTASSIUM SERPL-SCNC: 3.9 MMOL/L — SIGNIFICANT CHANGE UP (ref 3.5–5.3)
SODIUM SERPL-SCNC: 139 MMOL/L — SIGNIFICANT CHANGE UP (ref 135–145)

## 2018-02-01 ENCOUNTER — APPOINTMENT (OUTPATIENT)
Dept: COLORECTAL SURGERY | Facility: HOSPITAL | Age: 63
End: 2018-02-01
Payer: MEDICAID

## 2018-02-01 ENCOUNTER — OUTPATIENT (OUTPATIENT)
Dept: OUTPATIENT SERVICES | Facility: HOSPITAL | Age: 63
LOS: 1 days | End: 2018-02-01
Payer: MEDICAID

## 2018-02-01 ENCOUNTER — RESULT REVIEW (OUTPATIENT)
Age: 63
End: 2018-02-01

## 2018-02-01 ENCOUNTER — INPATIENT (INPATIENT)
Facility: HOSPITAL | Age: 63
LOS: 2 days | Discharge: HOME | DRG: 346 | End: 2018-02-04
Attending: SURGERY | Admitting: SURGERY
Payer: MEDICAID

## 2018-02-01 VITALS
DIASTOLIC BLOOD PRESSURE: 74 MMHG | HEIGHT: 60 IN | HEART RATE: 96 BPM | RESPIRATION RATE: 20 BRPM | TEMPERATURE: 98 F | WEIGHT: 152.56 LBS | OXYGEN SATURATION: 98 % | SYSTOLIC BLOOD PRESSURE: 109 MMHG

## 2018-02-01 DIAGNOSIS — K55.1 CHRONIC VASCULAR DISORDERS OF INTESTINE: ICD-10-CM

## 2018-02-01 LAB
GLUCOSE BLDC GLUCOMTR-MCNC: 182 MG/DL — HIGH (ref 70–99)
GLUCOSE BLDC GLUCOMTR-MCNC: 95 MG/DL — SIGNIFICANT CHANGE UP (ref 70–99)

## 2018-02-01 PROCEDURE — G9001: CPT

## 2018-02-01 PROCEDURE — 88305 TISSUE EXAM BY PATHOLOGIST: CPT | Mod: 26

## 2018-02-01 PROCEDURE — 45330 DIAGNOSTIC SIGMOIDOSCOPY: CPT

## 2018-02-01 PROCEDURE — 44227 LAP CLOSE ENTEROSTOMY: CPT | Mod: 80

## 2018-02-01 PROCEDURE — 88304 TISSUE EXAM BY PATHOLOGIST: CPT | Mod: 26

## 2018-02-01 PROCEDURE — 44227 LAP CLOSE ENTEROSTOMY: CPT

## 2018-02-01 RX ORDER — SODIUM CHLORIDE 9 MG/ML
1000 INJECTION, SOLUTION INTRAVENOUS
Qty: 0 | Refills: 0 | Status: DISCONTINUED | OUTPATIENT
Start: 2018-02-01 | End: 2018-02-02

## 2018-02-01 RX ORDER — ACETAMINOPHEN 500 MG
1000 TABLET ORAL ONCE
Qty: 0 | Refills: 0 | Status: DISCONTINUED | OUTPATIENT
Start: 2018-02-01 | End: 2018-02-03

## 2018-02-01 RX ORDER — ACETAMINOPHEN 500 MG
1000 TABLET ORAL ONCE
Qty: 0 | Refills: 0 | Status: COMPLETED | OUTPATIENT
Start: 2018-02-02 | End: 2018-02-02

## 2018-02-01 RX ORDER — MORPHINE SULFATE 50 MG/1
2 CAPSULE, EXTENDED RELEASE ORAL EVERY 4 HOURS
Qty: 0 | Refills: 0 | Status: DISCONTINUED | OUTPATIENT
Start: 2018-02-01 | End: 2018-02-03

## 2018-02-01 RX ORDER — LIDOCAINE HCL 20 MG/ML
0.2 VIAL (ML) INJECTION ONCE
Qty: 0 | Refills: 0 | Status: DISCONTINUED | OUTPATIENT
Start: 2018-02-01 | End: 2018-02-01

## 2018-02-01 RX ORDER — HYDROMORPHONE HYDROCHLORIDE 2 MG/ML
0.5 INJECTION INTRAMUSCULAR; INTRAVENOUS; SUBCUTANEOUS
Qty: 0 | Refills: 0 | Status: DISCONTINUED | OUTPATIENT
Start: 2018-02-01 | End: 2018-02-01

## 2018-02-01 RX ORDER — METFORMIN HYDROCHLORIDE 850 MG/1
1 TABLET ORAL
Qty: 0 | Refills: 0 | COMMUNITY

## 2018-02-01 RX ORDER — INFLUENZA VIRUS VACCINE 15; 15; 15; 15 UG/.5ML; UG/.5ML; UG/.5ML; UG/.5ML
0.5 SUSPENSION INTRAMUSCULAR ONCE
Qty: 0 | Refills: 0 | Status: DISCONTINUED | OUTPATIENT
Start: 2018-02-01 | End: 2018-02-04

## 2018-02-01 RX ORDER — ONDANSETRON 8 MG/1
4 TABLET, FILM COATED ORAL EVERY 8 HOURS
Qty: 0 | Refills: 0 | Status: DISCONTINUED | OUTPATIENT
Start: 2018-02-01 | End: 2018-02-01

## 2018-02-01 RX ORDER — SODIUM CHLORIDE 9 MG/ML
3 INJECTION INTRAMUSCULAR; INTRAVENOUS; SUBCUTANEOUS EVERY 8 HOURS
Qty: 0 | Refills: 0 | Status: DISCONTINUED | OUTPATIENT
Start: 2018-02-01 | End: 2018-02-01

## 2018-02-01 RX ADMIN — HYDROMORPHONE HYDROCHLORIDE 0.5 MILLIGRAM(S): 2 INJECTION INTRAMUSCULAR; INTRAVENOUS; SUBCUTANEOUS at 20:15

## 2018-02-01 RX ADMIN — HYDROMORPHONE HYDROCHLORIDE 0.5 MILLIGRAM(S): 2 INJECTION INTRAMUSCULAR; INTRAVENOUS; SUBCUTANEOUS at 20:50

## 2018-02-01 RX ADMIN — HYDROMORPHONE HYDROCHLORIDE 0.5 MILLIGRAM(S): 2 INJECTION INTRAMUSCULAR; INTRAVENOUS; SUBCUTANEOUS at 20:00

## 2018-02-01 RX ADMIN — HYDROMORPHONE HYDROCHLORIDE 0.5 MILLIGRAM(S): 2 INJECTION INTRAMUSCULAR; INTRAVENOUS; SUBCUTANEOUS at 21:05

## 2018-02-01 NOTE — PROGRESS NOTE ADULT - SUBJECTIVE AND OBJECTIVE BOX
[facility] GENERAL SURGERY POST-OP NOTE    SUBJECTIVE: Pt seen and evaluated at bedside. Resting comfortably in bed. Pain controlled. Denies nausea/vomiting, CP, palpitations, SOB, lightheaded, dizziness. Voiding.       Objective:  Gen: NAD, AAOx3  Pulm: b/l chest rise. No work on breathing  Card: RRR  Abd: soft, appropriately tender, ND. port site dressings x5 c/d/i, midline umbilical dressing c/d/i    Vital Signs Last 24 Hrs  T(C): 34.7 (01 Feb 2018 22:50), Max: 36.9 (01 Feb 2018 21:51)  T(F): 94.5 (01 Feb 2018 22:50), Max: 98.4 (01 Feb 2018 21:51)  HR: 96 (01 Feb 2018 22:50) (81 - 96)  BP: 114/82 (01 Feb 2018 22:50) (102/55 - 158/74)  BP(mean): 74 (01 Feb 2018 21:00) (73 - 106)  RR: 18 (01 Feb 2018 22:50) (14 - 20)  SpO2: 94% (01 Feb 2018 22:50) (94% - 100%)  I&O's Summary    01 Feb 2018 07:01  -  01 Feb 2018 23:33  --------------------------------------------------------  IN: 0 mL / OUT: 200 mL / NET: -200 mL      I&O's Detail    01 Feb 2018 07:01  -  01 Feb 2018 23:33  --------------------------------------------------------  IN:  Total IN: 0 mL    OUT:    Indwelling Catheter - Urethral: 200 mL  Total OUT: 200 mL    Total NET: -200 mL          MEDICATIONS  (STANDING):  acetaminophen  IVPB. 1000 milliGRAM(s) IV Intermittent once  cefoTEtan  IVPB 2 Gram(s) IV Intermittent once  influenza   Vaccine 0.5 milliLiter(s) IntraMuscular once  lactated ringers. 1000 milliLiter(s) (70 mL/Hr) IV Continuous <Continuous>    MEDICATIONS  (PRN):  morphine  - Injectable 2 milliGRAM(s) IV Push every 4 hours PRN Severe Pain (7 - 10)      LABS:                RADIOLOGY & ADDITIONAL STUDIES:      A/P: 62y Male s/p lap robotic colostomy reversal on 2/1. Pt hemodynamically stable with adequate pain control.      - Pain control  - Strict I/O's  - F/U GI fxn  - OOB  - DVT ppx  - f/u AM labs  - continue armas, f/u UOP

## 2018-02-02 ENCOUNTER — TRANSCRIPTION ENCOUNTER (OUTPATIENT)
Age: 63
End: 2018-02-02

## 2018-02-02 LAB
ANION GAP SERPL CALC-SCNC: 13 MMOL/L — SIGNIFICANT CHANGE UP (ref 5–17)
BUN SERPL-MCNC: 12 MG/DL — SIGNIFICANT CHANGE UP (ref 7–23)
CALCIUM SERPL-MCNC: 8.3 MG/DL — LOW (ref 8.4–10.5)
CHLORIDE SERPL-SCNC: 100 MMOL/L — SIGNIFICANT CHANGE UP (ref 96–108)
CO2 SERPL-SCNC: 24 MMOL/L — SIGNIFICANT CHANGE UP (ref 22–31)
CREAT SERPL-MCNC: 0.85 MG/DL — SIGNIFICANT CHANGE UP (ref 0.5–1.3)
GLUCOSE BLDC GLUCOMTR-MCNC: 125 MG/DL — HIGH (ref 70–99)
GLUCOSE BLDC GLUCOMTR-MCNC: 130 MG/DL — HIGH (ref 70–99)
GLUCOSE BLDC GLUCOMTR-MCNC: 151 MG/DL — HIGH (ref 70–99)
GLUCOSE SERPL-MCNC: 129 MG/DL — HIGH (ref 70–99)
HCT VFR BLD CALC: 39.5 % — SIGNIFICANT CHANGE UP (ref 39–50)
HGB BLD-MCNC: 13 G/DL — SIGNIFICANT CHANGE UP (ref 13–17)
MAGNESIUM SERPL-MCNC: 2.3 MG/DL — SIGNIFICANT CHANGE UP (ref 1.6–2.6)
MCHC RBC-ENTMCNC: 28.4 PG — SIGNIFICANT CHANGE UP (ref 27–34)
MCHC RBC-ENTMCNC: 32.9 GM/DL — SIGNIFICANT CHANGE UP (ref 32–36)
MCV RBC AUTO: 86.4 FL — SIGNIFICANT CHANGE UP (ref 80–100)
PHOSPHATE SERPL-MCNC: 3.7 MG/DL — SIGNIFICANT CHANGE UP (ref 2.5–4.5)
PLATELET # BLD AUTO: 225 K/UL — SIGNIFICANT CHANGE UP (ref 150–400)
POTASSIUM SERPL-MCNC: 4.7 MMOL/L — SIGNIFICANT CHANGE UP (ref 3.5–5.3)
POTASSIUM SERPL-SCNC: 4.7 MMOL/L — SIGNIFICANT CHANGE UP (ref 3.5–5.3)
RBC # BLD: 4.57 M/UL — SIGNIFICANT CHANGE UP (ref 4.2–5.8)
RBC # FLD: 13.5 % — SIGNIFICANT CHANGE UP (ref 10.3–14.5)
SODIUM SERPL-SCNC: 137 MMOL/L — SIGNIFICANT CHANGE UP (ref 135–145)
WBC # BLD: 7.11 K/UL — SIGNIFICANT CHANGE UP (ref 3.8–10.5)
WBC # FLD AUTO: 7.11 K/UL — SIGNIFICANT CHANGE UP (ref 3.8–10.5)

## 2018-02-02 RX ORDER — DEXTROSE MONOHYDRATE, SODIUM CHLORIDE, AND POTASSIUM CHLORIDE 50; .745; 4.5 G/1000ML; G/1000ML; G/1000ML
1000 INJECTION, SOLUTION INTRAVENOUS
Qty: 0 | Refills: 0 | Status: DISCONTINUED | OUTPATIENT
Start: 2018-02-02 | End: 2018-02-03

## 2018-02-02 RX ORDER — GLUCAGON INJECTION, SOLUTION 0.5 MG/.1ML
1 INJECTION, SOLUTION SUBCUTANEOUS ONCE
Qty: 0 | Refills: 0 | Status: DISCONTINUED | OUTPATIENT
Start: 2018-02-02 | End: 2018-02-04

## 2018-02-02 RX ORDER — KETOROLAC TROMETHAMINE 30 MG/ML
15 SYRINGE (ML) INJECTION EVERY 6 HOURS
Qty: 0 | Refills: 0 | Status: DISCONTINUED | OUTPATIENT
Start: 2018-02-02 | End: 2018-02-04

## 2018-02-02 RX ORDER — DEXTROSE 50 % IN WATER 50 %
1 SYRINGE (ML) INTRAVENOUS ONCE
Qty: 0 | Refills: 0 | Status: DISCONTINUED | OUTPATIENT
Start: 2018-02-02 | End: 2018-02-04

## 2018-02-02 RX ORDER — SODIUM CHLORIDE 9 MG/ML
1000 INJECTION, SOLUTION INTRAVENOUS
Qty: 0 | Refills: 0 | Status: DISCONTINUED | OUTPATIENT
Start: 2018-02-02 | End: 2018-02-04

## 2018-02-02 RX ORDER — INSULIN LISPRO 100/ML
VIAL (ML) SUBCUTANEOUS
Qty: 0 | Refills: 0 | Status: DISCONTINUED | OUTPATIENT
Start: 2018-02-02 | End: 2018-02-04

## 2018-02-02 RX ORDER — INSULIN LISPRO 100/ML
VIAL (ML) SUBCUTANEOUS EVERY 6 HOURS
Qty: 0 | Refills: 0 | Status: DISCONTINUED | OUTPATIENT
Start: 2018-02-02 | End: 2018-02-02

## 2018-02-02 RX ORDER — DEXTROSE 50 % IN WATER 50 %
25 SYRINGE (ML) INTRAVENOUS ONCE
Qty: 0 | Refills: 0 | Status: DISCONTINUED | OUTPATIENT
Start: 2018-02-02 | End: 2018-02-04

## 2018-02-02 RX ORDER — INSULIN LISPRO 100/ML
VIAL (ML) SUBCUTANEOUS AT BEDTIME
Qty: 0 | Refills: 0 | Status: DISCONTINUED | OUTPATIENT
Start: 2018-02-02 | End: 2018-02-04

## 2018-02-02 RX ORDER — HEPARIN SODIUM 5000 [USP'U]/ML
5000 INJECTION INTRAVENOUS; SUBCUTANEOUS EVERY 8 HOURS
Qty: 0 | Refills: 0 | Status: DISCONTINUED | OUTPATIENT
Start: 2018-02-02 | End: 2018-02-04

## 2018-02-02 RX ORDER — DEXTROSE 50 % IN WATER 50 %
12.5 SYRINGE (ML) INTRAVENOUS ONCE
Qty: 0 | Refills: 0 | Status: DISCONTINUED | OUTPATIENT
Start: 2018-02-02 | End: 2018-02-04

## 2018-02-02 RX ADMIN — Medication 400 MILLIGRAM(S): at 07:51

## 2018-02-02 RX ADMIN — Medication 15 MILLIGRAM(S): at 23:14

## 2018-02-02 RX ADMIN — Medication 15 MILLIGRAM(S): at 17:31

## 2018-02-02 RX ADMIN — Medication 1000 MILLIGRAM(S): at 08:06

## 2018-02-02 RX ADMIN — Medication 1000 MILLIGRAM(S): at 04:02

## 2018-02-02 RX ADMIN — HEPARIN SODIUM 5000 UNIT(S): 5000 INJECTION INTRAVENOUS; SUBCUTANEOUS at 21:39

## 2018-02-02 RX ADMIN — Medication 15 MILLIGRAM(S): at 23:40

## 2018-02-02 RX ADMIN — Medication 400 MILLIGRAM(S): at 13:50

## 2018-02-02 RX ADMIN — Medication 1: at 13:45

## 2018-02-02 RX ADMIN — Medication 1000 MILLIGRAM(S): at 14:05

## 2018-02-02 RX ADMIN — HEPARIN SODIUM 5000 UNIT(S): 5000 INJECTION INTRAVENOUS; SUBCUTANEOUS at 13:46

## 2018-02-02 RX ADMIN — Medication 400 MILLIGRAM(S): at 04:02

## 2018-02-02 RX ADMIN — Medication 15 MILLIGRAM(S): at 11:14

## 2018-02-02 RX ADMIN — Medication 15 MILLIGRAM(S): at 17:16

## 2018-02-02 RX ADMIN — DEXTROSE MONOHYDRATE, SODIUM CHLORIDE, AND POTASSIUM CHLORIDE 75 MILLILITER(S): 50; .745; 4.5 INJECTION, SOLUTION INTRAVENOUS at 11:14

## 2018-02-02 RX ADMIN — Medication 15 MILLIGRAM(S): at 11:29

## 2018-02-02 NOTE — ANESTHESIA FOLLOW-UP NOTE - NSCONDITION_GEN_ALL_CORE
HPI:    Patient ID: Evan Newman is a 43year old female.     HPI  Patient Active Problem List:        Depression     Generalized anxiety disorder     Contusion of left front wall of thorax     Patient is here for follow-up of anxiety and left rib pa pulsation. MS: Minimal tenderness if any on palpation of the left lateral chest wall no rib deformity no bruising noted  PSYCH: Mood and affect are normal.  Judgement and insight are intact. No suicidal thoughts.             ASSESSMENT/PLAN:   Contusion o Stable

## 2018-02-02 NOTE — PROGRESS NOTE ADULT - ASSESSMENT
A/P: 62y Male s/p lap robotic colostomy reversal POD1     - Pain control  - Strict I/O's  - F/U GI fxn  - OOB  - DVT ppx  - f/u AM labs  - continue armas, f/u UOP A/P: 62y Male s/p lap robotic colostomy reversal POD1  - diet clears  - Pain control - toradol AM, OFIRMEV  - Strict I/O's  - F/U GI fxn  - OOB  - DVT ppx  - f/u AM labs  - continue armas, f/u UOP

## 2018-02-02 NOTE — PROGRESS NOTE ADULT - SUBJECTIVE AND OBJECTIVE BOX
Red Team Surgery Progress Note     Subjective/24hour Events: No acute events overnight. Resting comfortably. Pain controlled. Denies nausea/vomiting, CP, palpitations, SOB, lightheaded, dizziness.    Vital Signs:  Vital Signs Last 24 Hrs  T(C): 36.8 (02 Feb 2018 00:50), Max: 36.9 (01 Feb 2018 21:51)  T(F): 98.3 (02 Feb 2018 00:50), Max: 98.4 (01 Feb 2018 21:51)  HR: 89 (02 Feb 2018 00:50) (81 - 96)  BP: 107/68 (02 Feb 2018 00:50) (102/55 - 158/74)  BP(mean): 74 (01 Feb 2018 21:00) (73 - 106)  RR: 18 (02 Feb 2018 00:50) (14 - 20)  SpO2: 98% (02 Feb 2018 00:50) (94% - 100%)    CAPILLARY BLOOD GLUCOSE      POCT Blood Glucose.: 182 mg/dL (01 Feb 2018 21:23)  POCT Blood Glucose.: 95 mg/dL (01 Feb 2018 10:46)      I&O's Detail    01 Feb 2018 07:01  -  02 Feb 2018 05:25  --------------------------------------------------------  IN:  Total IN: 0 mL    OUT:    Indwelling Catheter - Urethral: 200 mL  Total OUT: 200 mL    Total NET: -200 mL            MEDICATIONS  (STANDING):  acetaminophen  IVPB. 1000 milliGRAM(s) IV Intermittent once  acetaminophen  IVPB. 1000 milliGRAM(s) IV Intermittent once  acetaminophen  IVPB. 1000 milliGRAM(s) IV Intermittent once  cefoTEtan  IVPB 2 Gram(s) IV Intermittent once  influenza   Vaccine 0.5 milliLiter(s) IntraMuscular once  lactated ringers. 1000 milliLiter(s) (70 mL/Hr) IV Continuous <Continuous>    MEDICATIONS  (PRN):  morphine  - Injectable 2 milliGRAM(s) IV Push every 4 hours PRN Severe Pain (7 - 10)        Physical Exam:  Gen: NAD  Abd: soft, appropriately tender, ND. port site dressings x5 c/d/i, midline umbilical dressing c/d/i

## 2018-02-03 ENCOUNTER — TRANSCRIPTION ENCOUNTER (OUTPATIENT)
Age: 63
End: 2018-02-03

## 2018-02-03 LAB
ANION GAP SERPL CALC-SCNC: 11 MMOL/L — SIGNIFICANT CHANGE UP (ref 5–17)
BUN SERPL-MCNC: 10 MG/DL — SIGNIFICANT CHANGE UP (ref 7–23)
CALCIUM SERPL-MCNC: 8.2 MG/DL — LOW (ref 8.4–10.5)
CHLORIDE SERPL-SCNC: 101 MMOL/L — SIGNIFICANT CHANGE UP (ref 96–108)
CO2 SERPL-SCNC: 24 MMOL/L — SIGNIFICANT CHANGE UP (ref 22–31)
CREAT SERPL-MCNC: 0.75 MG/DL — SIGNIFICANT CHANGE UP (ref 0.5–1.3)
GLUCOSE BLDC GLUCOMTR-MCNC: 106 MG/DL — HIGH (ref 70–99)
GLUCOSE BLDC GLUCOMTR-MCNC: 147 MG/DL — HIGH (ref 70–99)
GLUCOSE BLDC GLUCOMTR-MCNC: 148 MG/DL — HIGH (ref 70–99)
GLUCOSE BLDC GLUCOMTR-MCNC: 153 MG/DL — HIGH (ref 70–99)
GLUCOSE SERPL-MCNC: 192 MG/DL — HIGH (ref 70–99)
HCT VFR BLD CALC: 35.6 % — LOW (ref 39–50)
HGB BLD-MCNC: 11.9 G/DL — LOW (ref 13–17)
MAGNESIUM SERPL-MCNC: 2.1 MG/DL — SIGNIFICANT CHANGE UP (ref 1.6–2.6)
MCHC RBC-ENTMCNC: 28.2 PG — SIGNIFICANT CHANGE UP (ref 27–34)
MCHC RBC-ENTMCNC: 33.4 GM/DL — SIGNIFICANT CHANGE UP (ref 32–36)
MCV RBC AUTO: 84.4 FL — SIGNIFICANT CHANGE UP (ref 80–100)
PHOSPHATE SERPL-MCNC: 2.2 MG/DL — LOW (ref 2.5–4.5)
PLATELET # BLD AUTO: 239 K/UL — SIGNIFICANT CHANGE UP (ref 150–400)
POTASSIUM SERPL-MCNC: 3.9 MMOL/L — SIGNIFICANT CHANGE UP (ref 3.5–5.3)
POTASSIUM SERPL-SCNC: 3.9 MMOL/L — SIGNIFICANT CHANGE UP (ref 3.5–5.3)
RBC # BLD: 4.22 M/UL — SIGNIFICANT CHANGE UP (ref 4.2–5.8)
RBC # FLD: 13.7 % — SIGNIFICANT CHANGE UP (ref 10.3–14.5)
SODIUM SERPL-SCNC: 136 MMOL/L — SIGNIFICANT CHANGE UP (ref 135–145)
WBC # BLD: 6.46 K/UL — SIGNIFICANT CHANGE UP (ref 3.8–10.5)
WBC # FLD AUTO: 6.46 K/UL — SIGNIFICANT CHANGE UP (ref 3.8–10.5)

## 2018-02-03 RX ORDER — ACETAMINOPHEN 500 MG
650 TABLET ORAL EVERY 6 HOURS
Qty: 0 | Refills: 0 | Status: DISCONTINUED | OUTPATIENT
Start: 2018-02-03 | End: 2018-02-03

## 2018-02-03 RX ORDER — OXYCODONE HYDROCHLORIDE 5 MG/1
5 TABLET ORAL EVERY 4 HOURS
Qty: 0 | Refills: 0 | Status: DISCONTINUED | OUTPATIENT
Start: 2018-02-03 | End: 2018-02-04

## 2018-02-03 RX ORDER — ACETAMINOPHEN 500 MG
650 TABLET ORAL EVERY 6 HOURS
Qty: 0 | Refills: 0 | Status: DISCONTINUED | OUTPATIENT
Start: 2018-02-03 | End: 2018-02-04

## 2018-02-03 RX ORDER — SODIUM,POTASSIUM PHOSPHATES 278-250MG
1 POWDER IN PACKET (EA) ORAL ONCE
Qty: 0 | Refills: 0 | Status: COMPLETED | OUTPATIENT
Start: 2018-02-03 | End: 2018-02-03

## 2018-02-03 RX ORDER — OXYCODONE HYDROCHLORIDE 5 MG/1
10 TABLET ORAL EVERY 4 HOURS
Qty: 0 | Refills: 0 | Status: DISCONTINUED | OUTPATIENT
Start: 2018-02-03 | End: 2018-02-04

## 2018-02-03 RX ORDER — OXYCODONE HYDROCHLORIDE 5 MG/1
1 TABLET ORAL
Qty: 25 | Refills: 0 | OUTPATIENT
Start: 2018-02-03

## 2018-02-03 RX ADMIN — Medication 15 MILLIGRAM(S): at 12:45

## 2018-02-03 RX ADMIN — Medication 15 MILLIGRAM(S): at 18:23

## 2018-02-03 RX ADMIN — Medication 15 MILLIGRAM(S): at 12:30

## 2018-02-03 RX ADMIN — HEPARIN SODIUM 5000 UNIT(S): 5000 INJECTION INTRAVENOUS; SUBCUTANEOUS at 13:56

## 2018-02-03 RX ADMIN — Medication 15 MILLIGRAM(S): at 05:10

## 2018-02-03 RX ADMIN — HEPARIN SODIUM 5000 UNIT(S): 5000 INJECTION INTRAVENOUS; SUBCUTANEOUS at 04:46

## 2018-02-03 RX ADMIN — Medication 15 MILLIGRAM(S): at 18:38

## 2018-02-03 RX ADMIN — HEPARIN SODIUM 5000 UNIT(S): 5000 INJECTION INTRAVENOUS; SUBCUTANEOUS at 21:15

## 2018-02-03 RX ADMIN — Medication 1 PACKET(S): at 13:56

## 2018-02-03 RX ADMIN — Medication 15 MILLIGRAM(S): at 04:47

## 2018-02-03 NOTE — DISCHARGE NOTE ADULT - HOSPITAL COURSE
62 yr old male presents to PST for Robotic Colostomy Reversal on 2/1/18. PMH: BPH, HTN (now off meds), Kidney stones (S/P litho x2), DM2 (oral medication), and Colitis (S/P bowel resection w/ colostomy 9/8/17).  Pt underwent a laparoscopic robotic colostomy reversal on 2/1/2018. He tolerated the procedure well, was extubated and transferred to PACU in stable condition. On the floor, his diet was slowly advanced as tolerated once GI function resumed. Pt was treated for hypophosphatemia with repletions. Pt currently tolerating a low fiber diet, ambulating, voiding and pain controlled. Pt stable for discharge to home. He was instructed to follow up with Dr. De Leon in 1 week.

## 2018-02-03 NOTE — DISCHARGE NOTE ADULT - MEDICATION SUMMARY - MEDICATIONS TO TAKE
I will START or STAY ON the medications listed below when I get home from the hospital:    diamicron  -- 60 milligram(s) by mouth once a day, in AM  -- Indication: For diabetes    acetaminophen 325 mg oral tablet  -- 2 tab(s) by mouth every 6 hours, As Needed  -- Indication: For pain    oxyCODONE 5 mg oral tablet  -- 1-2 tab(s) by mouth every 4-6 hours, As needed, for pain MDD:8  -- Indication: For pain    metFORMIN 500 mg oral tablet  -- 1 tab(s) by mouth once a day (at bedtime)  -- Indication: For diabetes

## 2018-02-03 NOTE — DISCHARGE NOTE ADULT - PATIENT PORTAL LINK FT
You can access the [email or web address], offered by [facility], by registering with the following website: [email or web address]

## 2018-02-03 NOTE — PROGRESS NOTE ADULT - SUBJECTIVE AND OBJECTIVE BOX
Red Team Surgery Progress Note     Subjective/24hour Events :No acute events overnight. Resting comfortably. Pain controlled. Denies nausea/vomiting, CP, palpitations, SOB, lightheaded, dizziness. Passing flatus, no stool    Vital Signs:  Vital Signs Last 24 Hrs  T(C): 36.9 (03 Feb 2018 05:52), Max: 37.5 (03 Feb 2018 00:45)  T(F): 98.4 (03 Feb 2018 05:52), Max: 99.5 (03 Feb 2018 00:45)  HR: 93 (03 Feb 2018 05:52) (64 - 93)  BP: 134/80 (03 Feb 2018 05:52) (99/62 - 134/80)  BP(mean): --  RR: 17 (03 Feb 2018 05:52) (16 - 18)  SpO2: 94% (03 Feb 2018 05:52) (94% - 98%)    CAPILLARY BLOOD GLUCOSE      POCT Blood Glucose.: 125 mg/dL (02 Feb 2018 22:10)  POCT Blood Glucose.: 130 mg/dL (02 Feb 2018 17:46)  POCT Blood Glucose.: 151 mg/dL (02 Feb 2018 12:53)      I&O's Detail    02 Feb 2018 07:01  -  03 Feb 2018 07:00  --------------------------------------------------------  IN:    dextrose 5% + sodium chloride 0.45% with potassium chloride 20 mEq/L: 1800 mL    IV PiggyBack: 200 mL    Oral Fluid: 480 mL  Total IN: 2480 mL    OUT:    Indwelling Catheter - Urethral: 450 mL    Voided: 1445 mL  Total OUT: 1895 mL    Total NET: 585 mL            MEDICATIONS  (STANDING):  acetaminophen  IVPB. 1000 milliGRAM(s) IV Intermittent once  cefoTEtan  IVPB 2 Gram(s) IV Intermittent once  dextrose 5% + sodium chloride 0.45% with potassium chloride 20 mEq/L 1000 milliLiter(s) (75 mL/Hr) IV Continuous <Continuous>  dextrose 5%. 1000 milliLiter(s) (50 mL/Hr) IV Continuous <Continuous>  dextrose 50% Injectable 12.5 Gram(s) IV Push once  dextrose 50% Injectable 25 Gram(s) IV Push once  dextrose 50% Injectable 25 Gram(s) IV Push once  heparin  Injectable 5000 Unit(s) SubCutaneous every 8 hours  influenza   Vaccine 0.5 milliLiter(s) IntraMuscular once  insulin lispro (HumaLOG) corrective regimen sliding scale   SubCutaneous three times a day before meals  insulin lispro (HumaLOG) corrective regimen sliding scale   SubCutaneous at bedtime  ketorolac   Injectable 15 milliGRAM(s) IV Push every 6 hours    MEDICATIONS  (PRN):  dextrose Gel 1 Dose(s) Oral once PRN Blood Glucose LESS THAN 70 milliGRAM(s)/deciliter  glucagon  Injectable 1 milliGRAM(s) IntraMuscular once PRN Glucose LESS THAN 70 milligrams/deciliter  morphine  - Injectable 2 milliGRAM(s) IV Push every 4 hours PRN Severe Pain (7 - 10)        Physical Exam:  Gen: NAD  Abd: soft, appropriately tender, ND. port site dressings x5 c/d/i, midline umbilical dressing c/d/i      Labs:    02-02    137  |  100  |  12  ----------------------------<  129<H>  4.7   |  24  |  0.85    Ca    8.3<L>      02 Feb 2018 08:23  Phos  3.7     02-02  Mg     2.3     02-02                              13.0   7.11  )-----------( 225      ( 02 Feb 2018 07:54 )             39.5               Imaging:

## 2018-02-03 NOTE — DISCHARGE NOTE ADULT - CARE PLAN
Principal Discharge DX:	Ischemic colitis  Goal:	recover from surgery  Assessment and plan of treatment:	Follow up with Dr. De Leon in 1 week. Please call to schedule an appointment.   NOTIFY YOUR SURGEON IF: You have any bleeding that does not stop, any pus draining from your wound, any fever (over 100.4 F) or chills, persistent nausea/vomiting, persistent diarrhea, or if your pain is not controlled on your discharge pain medications.  Assessment and plan of treatment:	Please follow up with your primary care physician regarding your hospitalization. Please schedule an appointment with your primary care provider within two weeks after your discharge to review your hospital course.

## 2018-02-03 NOTE — DISCHARGE NOTE ADULT - PLAN OF CARE
recover from surgery Follow up with Dr. De Leon in 1 week. Please call to schedule an appointment.   NOTIFY YOUR SURGEON IF: You have any bleeding that does not stop, any pus draining from your wound, any fever (over 100.4 F) or chills, persistent nausea/vomiting, persistent diarrhea, or if your pain is not controlled on your discharge pain medications. Please follow up with your primary care physician regarding your hospitalization. Please schedule an appointment with your primary care provider within two weeks after your discharge to review your hospital course.

## 2018-02-03 NOTE — PROGRESS NOTE ADULT - ASSESSMENT
A/P: 62y Male s/p lap robotic colostomy reversal POD2  - diet clears  - Pain control - toradol AM, OFIRMEV  - Strict I/O's  - F/U GI fxn  - OOB  - DVT ppx  - f/u AM labs A/P: 62y Male s/p lap robotic colostomy reversal POD2  - LRD X2 then discharge  - Pain control   - Strict I/O's  - OOB  - DVT ppx  - f/u AM labs  - nurse to teach packing of colostomy reversal site

## 2018-02-03 NOTE — DISCHARGE NOTE ADULT - CARE PROVIDER_API CALL
Stephon De Leon), ColonRectal Surgery; Surgery  [address]  [address]  [place] [address]  Phone: [phone]  Fax: [phone]

## 2018-02-03 NOTE — DISCHARGE NOTE ADULT - NS AS ACTIVITY OBS
Stairs allowed/Walking-Indoors allowed/Walking-Outdoors allowed/Showering allowed/no driving while on prescription pain medication/No Heavy lifting/straining

## 2018-02-04 VITALS
TEMPERATURE: 99 F | DIASTOLIC BLOOD PRESSURE: 78 MMHG | OXYGEN SATURATION: 97 % | RESPIRATION RATE: 18 BRPM | SYSTOLIC BLOOD PRESSURE: 153 MMHG | HEART RATE: 86 BPM

## 2018-02-04 LAB
ANION GAP SERPL CALC-SCNC: 15 MMOL/L — SIGNIFICANT CHANGE UP (ref 5–17)
BUN SERPL-MCNC: 8 MG/DL — SIGNIFICANT CHANGE UP (ref 7–23)
CALCIUM SERPL-MCNC: 8.3 MG/DL — LOW (ref 8.4–10.5)
CHLORIDE SERPL-SCNC: 99 MMOL/L — SIGNIFICANT CHANGE UP (ref 96–108)
CO2 SERPL-SCNC: 23 MMOL/L — SIGNIFICANT CHANGE UP (ref 22–31)
CREAT SERPL-MCNC: 0.7 MG/DL — SIGNIFICANT CHANGE UP (ref 0.5–1.3)
GLUCOSE BLDC GLUCOMTR-MCNC: 141 MG/DL — HIGH (ref 70–99)
GLUCOSE BLDC GLUCOMTR-MCNC: 146 MG/DL — HIGH (ref 70–99)
GLUCOSE SERPL-MCNC: 184 MG/DL — HIGH (ref 70–99)
HCT VFR BLD CALC: 38.1 % — LOW (ref 39–50)
HGB BLD-MCNC: 11.7 G/DL — LOW (ref 13–17)
MCHC RBC-ENTMCNC: 28.3 PG — SIGNIFICANT CHANGE UP (ref 27–34)
MCHC RBC-ENTMCNC: 30.7 GM/DL — LOW (ref 32–36)
MCV RBC AUTO: 92 FL — SIGNIFICANT CHANGE UP (ref 80–100)
PLATELET # BLD AUTO: 207 K/UL — SIGNIFICANT CHANGE UP (ref 150–400)
POTASSIUM SERPL-MCNC: 4 MMOL/L — SIGNIFICANT CHANGE UP (ref 3.5–5.3)
POTASSIUM SERPL-SCNC: 4 MMOL/L — SIGNIFICANT CHANGE UP (ref 3.5–5.3)
RBC # BLD: 4.14 M/UL — LOW (ref 4.2–5.8)
RBC # FLD: 13.5 % — SIGNIFICANT CHANGE UP (ref 10.3–14.5)
SODIUM SERPL-SCNC: 137 MMOL/L — SIGNIFICANT CHANGE UP (ref 135–145)
WBC # BLD: 7.55 K/UL — SIGNIFICANT CHANGE UP (ref 3.8–10.5)
WBC # FLD AUTO: 7.55 K/UL — SIGNIFICANT CHANGE UP (ref 3.8–10.5)

## 2018-02-04 PROCEDURE — S2900: CPT

## 2018-02-04 PROCEDURE — 88305 TISSUE EXAM BY PATHOLOGIST: CPT

## 2018-02-04 PROCEDURE — 84100 ASSAY OF PHOSPHORUS: CPT

## 2018-02-04 PROCEDURE — 86900 BLOOD TYPING SEROLOGIC ABO: CPT

## 2018-02-04 PROCEDURE — 82962 GLUCOSE BLOOD TEST: CPT

## 2018-02-04 PROCEDURE — 88304 TISSUE EXAM BY PATHOLOGIST: CPT

## 2018-02-04 PROCEDURE — 86901 BLOOD TYPING SEROLOGIC RH(D): CPT

## 2018-02-04 PROCEDURE — 83735 ASSAY OF MAGNESIUM: CPT

## 2018-02-04 PROCEDURE — 80048 BASIC METABOLIC PNL TOTAL CA: CPT

## 2018-02-04 PROCEDURE — 85027 COMPLETE CBC AUTOMATED: CPT

## 2018-02-04 RX ADMIN — HEPARIN SODIUM 5000 UNIT(S): 5000 INJECTION INTRAVENOUS; SUBCUTANEOUS at 05:19

## 2018-02-04 RX ADMIN — Medication 15 MILLIGRAM(S): at 05:18

## 2018-02-04 RX ADMIN — Medication 15 MILLIGRAM(S): at 05:45

## 2018-02-04 RX ADMIN — HEPARIN SODIUM 5000 UNIT(S): 5000 INJECTION INTRAVENOUS; SUBCUTANEOUS at 13:55

## 2018-02-04 NOTE — PROGRESS NOTE ADULT - SUBJECTIVE AND OBJECTIVE BOX
Red Team Surgery Progress Note     Subjective/24hour Events: No acute events overnight. Tolerated LRDX2, passing flatus and stool.    Vital Signs:  Vital Signs Last 24 Hrs  T(C): 37.3 (04 Feb 2018 05:21), Max: 37.3 (04 Feb 2018 05:21)  T(F): 99.1 (04 Feb 2018 05:21), Max: 99.1 (04 Feb 2018 05:21)  HR: 80 (04 Feb 2018 05:21) (77 - 92)  BP: 151/83 (04 Feb 2018 05:21) (138/70 - 153/80)  BP(mean): --  RR: 18 (04 Feb 2018 05:21) (17 - 18)  SpO2: 98% (04 Feb 2018 05:21) (96% - 98%)    CAPILLARY BLOOD GLUCOSE      POCT Blood Glucose.: 153 mg/dL (03 Feb 2018 22:17)  POCT Blood Glucose.: 148 mg/dL (03 Feb 2018 17:29)  POCT Blood Glucose.: 106 mg/dL (03 Feb 2018 13:54)  POCT Blood Glucose.: 147 mg/dL (03 Feb 2018 09:40)      I&O's Detail    02 Feb 2018 07:01  -  03 Feb 2018 07:00  --------------------------------------------------------  IN:    dextrose 5% + sodium chloride 0.45% with potassium chloride 20 mEq/L: 1800 mL    IV PiggyBack: 200 mL    Oral Fluid: 480 mL  Total IN: 2480 mL    OUT:    Indwelling Catheter - Urethral: 450 mL    Voided: 1445 mL  Total OUT: 1895 mL    Total NET: 585 mL      03 Feb 2018 07:01  -  04 Feb 2018 06:30  --------------------------------------------------------  IN:    Oral Fluid: 1080 mL  Total IN: 1080 mL    OUT:    Voided: 1075 mL  Total OUT: 1075 mL    Total NET: 5 mL            MEDICATIONS  (STANDING):  dextrose 5%. 1000 milliLiter(s) (50 mL/Hr) IV Continuous <Continuous>  dextrose 50% Injectable 12.5 Gram(s) IV Push once  dextrose 50% Injectable 25 Gram(s) IV Push once  dextrose 50% Injectable 25 Gram(s) IV Push once  heparin  Injectable 5000 Unit(s) SubCutaneous every 8 hours  influenza   Vaccine 0.5 milliLiter(s) IntraMuscular once  insulin lispro (HumaLOG) corrective regimen sliding scale   SubCutaneous three times a day before meals  insulin lispro (HumaLOG) corrective regimen sliding scale   SubCutaneous at bedtime    MEDICATIONS  (PRN):  acetaminophen   Tablet. 650 milliGRAM(s) Oral every 6 hours PRN Mild Pain (1 - 3)  dextrose Gel 1 Dose(s) Oral once PRN Blood Glucose LESS THAN 70 milliGRAM(s)/deciliter  glucagon  Injectable 1 milliGRAM(s) IntraMuscular once PRN Glucose LESS THAN 70 milligrams/deciliter  oxyCODONE    IR 5 milliGRAM(s) Oral every 4 hours PRN Moderate Pain (4 - 6)  oxyCODONE    IR 10 milliGRAM(s) Oral every 4 hours PRN Severe Pain (7 - 10)        Physical Exam:  Gen: NAD  Abd: soft, appropriately tender, ND. colostomy reversal site c/d/i    Labs:    02-03    136  |  101  |  10  ----------------------------<  192<H>  3.9   |  24  |  0.75    Ca    8.2<L>      03 Feb 2018 08:23  Phos  2.2     02-03  Mg     2.1     02-03                              11.9   6.46  )-----------( 239      ( 03 Feb 2018 08:23 )             35.6               Imaging:

## 2018-02-04 NOTE — PROGRESS NOTE ADULT - ATTENDING COMMENTS
+ flatus, flaco diet    AAOx3  abd soft, incisions c/d/i    d/c home
+ flatus, flaco clears    AAOx3  abd soft, ND, incisions c/d/i, colostomy site clean, packing replaced    A/P POD#2 s/p robotic colostomy reversal  LRD  wound care teaching  d/c home if flaco LRD
no acute o/n events, pain well controlled, flaco clears    AAOx3  abd soft, tender at incisions, dressings c/d/i    A/P POD#1 s/p robotic colostomy reversal  clears, await bowel function  pain control  OOB, ambulate

## 2018-02-04 NOTE — PROGRESS NOTE ADULT - ASSESSMENT
A/P: 62y Male s/p lap robotic colostomy reversal POD3  - Pain control   - Strict I/O's  - OOB  - DVT ppx  - d/c this AM

## 2018-02-07 DIAGNOSIS — R69 ILLNESS, UNSPECIFIED: ICD-10-CM

## 2018-02-14 ENCOUNTER — APPOINTMENT (OUTPATIENT)
Dept: COLORECTAL SURGERY | Facility: CLINIC | Age: 63
End: 2018-02-14
Payer: MEDICAID

## 2018-02-14 VITALS
TEMPERATURE: 98.7 F | HEART RATE: 77 BPM | DIASTOLIC BLOOD PRESSURE: 80 MMHG | SYSTOLIC BLOOD PRESSURE: 131 MMHG | OXYGEN SATURATION: 100 %

## 2018-02-14 PROCEDURE — 99024 POSTOP FOLLOW-UP VISIT: CPT

## 2018-03-14 ENCOUNTER — APPOINTMENT (OUTPATIENT)
Dept: COLORECTAL SURGERY | Facility: CLINIC | Age: 63
End: 2018-03-14
Payer: MEDICAID

## 2018-03-14 PROCEDURE — 99213 OFFICE O/P EST LOW 20 MIN: CPT | Mod: 24

## 2018-03-14 RX ORDER — NEOMYCIN SULFATE 500 MG/1
500 TABLET ORAL
Qty: 6 | Refills: 0 | Status: DISCONTINUED | COMMUNITY
Start: 2018-01-24 | End: 2018-03-14

## 2018-03-14 RX ORDER — GABAPENTIN 100 MG
100 TABLET ORAL
Refills: 0 | Status: DISCONTINUED | COMMUNITY
End: 2018-03-14

## 2018-03-14 RX ORDER — METRONIDAZOLE 500 MG/1
500 TABLET ORAL DAILY
Qty: 3 | Refills: 0 | Status: DISCONTINUED | COMMUNITY
Start: 2018-01-24 | End: 2018-03-14

## 2018-03-14 RX ORDER — GABAPENTIN 100 MG/1
100 CAPSULE ORAL
Refills: 0 | Status: DISCONTINUED | COMMUNITY

## 2018-03-14 RX ORDER — LISINOPRIL 2.5 MG/1
2.5 TABLET ORAL
Refills: 0 | Status: DISCONTINUED | COMMUNITY
End: 2018-03-14

## 2018-03-14 RX ORDER — MULTIVIT-MIN/FA/LYCOPEN/LUTEIN .4-300-25
TABLET ORAL
Refills: 0 | Status: ACTIVE | COMMUNITY

## 2018-03-14 RX ORDER — METFORMIN HYDROCHLORIDE 500 MG/1
500 TABLET, COATED ORAL
Refills: 0 | Status: DISCONTINUED | COMMUNITY
End: 2018-03-14

## 2018-03-14 RX ORDER — TAMSULOSIN HYDROCHLORIDE 0.4 MG/1
0.4 CAPSULE ORAL
Refills: 0 | Status: DISCONTINUED | COMMUNITY
End: 2018-03-14

## 2018-04-06 ENCOUNTER — APPOINTMENT (OUTPATIENT)
Dept: UROLOGY | Facility: CLINIC | Age: 63
End: 2018-04-06

## 2018-07-23 PROBLEM — N40.0 BENIGN PROSTATIC HYPERPLASIA, PRESENCE OF LOWER URINARY TRACT SYMPTOMS UNSPECIFIED: Status: ACTIVE | Noted: 2017-09-27

## 2018-09-04 PROBLEM — K55.9 VASCULAR DISORDER OF INTESTINE, UNSPECIFIED: Chronic | Status: ACTIVE | Noted: 2017-08-26

## 2018-09-04 PROBLEM — E11.9 TYPE 2 DIABETES MELLITUS WITHOUT COMPLICATIONS: Chronic | Status: ACTIVE | Noted: 2017-08-26

## 2018-10-03 ENCOUNTER — APPOINTMENT (OUTPATIENT)
Dept: COLORECTAL SURGERY | Facility: CLINIC | Age: 63
End: 2018-10-03
Payer: MEDICAID

## 2018-10-03 DIAGNOSIS — Z98.890 OTHER SPECIFIED POSTPROCEDURAL STATES: ICD-10-CM

## 2018-10-03 PROCEDURE — 99213 OFFICE O/P EST LOW 20 MIN: CPT

## 2019-03-01 ENCOUNTER — OUTPATIENT (OUTPATIENT)
Dept: OUTPATIENT SERVICES | Facility: HOSPITAL | Age: 64
LOS: 1 days | End: 2019-03-01
Payer: MEDICAID

## 2019-03-01 PROCEDURE — G9001: CPT

## 2019-03-08 DIAGNOSIS — Z71.89 OTHER SPECIFIED COUNSELING: ICD-10-CM

## 2019-09-13 NOTE — H&P PST ADULT - COMFORT LEVEL, ACCEPTABLE
I have reviewed discharge instructions with the patient. The patient verbalized understanding. Patient left ED via Discharge Method: ambulatory to Home with self    Opportunity for questions and clarification provided. Patient given 0 scripts. To continue your aftercare when you leave the hospital, you may receive an automated call from our care team to check in on how you are doing. This is a free service and part of our promise to provide the best care and service to meet your aftercare needs.  If you have questions, or wish to unsubscribe from this service please call [phone]. Thank you for Choosing our [facility].
Pt provided urinal and educated on need for urine sample. Will continue to monitor.   
Urine sent  
2

## 2020-02-21 NOTE — PROGRESS NOTE ADULT - SUBJECTIVE AND OBJECTIVE BOX
Morning Surgical Progress Note  Patient is a 62y old  Male who presents with a chief complaint of ischemic colitis (28 Aug 2017 11:59)    SUBJECTIVE: Patient seen and examined at bedside with surgical team, patient complains of nausea no vomiting. Advanced to regular diet last night . States he is hungry    Vital Signs Last 24 Hrs  T(C): 37 (12 Sep 2017 05:53), Max: 37 (12 Sep 2017 05:53)  T(F): 98.6 (12 Sep 2017 05:53), Max: 98.6 (12 Sep 2017 05:53)  HR: 86 (12 Sep 2017 05:53) (74 - 89)  BP: 130/89 (12 Sep 2017 05:53) (126/91 - 155/87)  BP(mean): --  RR: 17 (12 Sep 2017 05:53) (16 - 17)  SpO2: 98% (12 Sep 2017 05:53) (97% - 100%)  I&O's Detail    11 Sep 2017 07:01  -  12 Sep 2017 07:00  --------------------------------------------------------  IN:    dextrose 5% + sodium chloride 0.45% with potassium chloride 20 mEq/L: 525 mL    lactated ringers.: 200 mL    Oral Fluid: 1720 mL  Total IN: 2445 mL    OUT:    Colostomy: 875 mL    Voided: 2250 mL  Total OUT: 3125 mL    Total NET: -680 mL        MEDICATIONS  (STANDING):  tamsulosin 0.4 milliGRAM(s) Oral at bedtime  dextrose 50% Injectable 12.5 Gram(s) IV Push once  dextrose 50% Injectable 25 Gram(s) IV Push once  dextrose 50% Injectable 25 Gram(s) IV Push once  insulin lispro (HumaLOG) corrective regimen sliding scale   SubCutaneous three times a day before meals  insulin lispro (HumaLOG) corrective regimen sliding scale   SubCutaneous at bedtime  gabapentin 100 milliGRAM(s) Oral three times a day  enoxaparin Injectable 40 milliGRAM(s) SubCutaneous daily  acetaminophen   Tablet. 650 milliGRAM(s) Oral every 6 hours  lisinopril 2.5 milliGRAM(s) Oral daily  sodium chloride 0.9% lock flush 3 milliLiter(s) IV Push every 8 hours    MEDICATIONS  (PRN):  dextrose Gel 1 Dose(s) Oral once PRN Blood Glucose LESS THAN 70 milliGRAM(s)/deciliter  glucagon  Injectable 1 milliGRAM(s) IntraMuscular once PRN Glucose LESS THAN 70 milligrams/deciliter  vitamin A &amp; D Ointment 1 Application(s) Topical every 3 hours PRN skin irritation  ondansetron Injectable 4 milliGRAM(s) IV Push every 6 hours PRN Nausea  ketorolac   Injectable 15 milliGRAM(s) IV Push every 8 hours PRN Moderate Pain (4 - 6)  oxyCODONE    IR 5 milliGRAM(s) Oral every 3 hours PRN pain  oxyCODONE    IR 10 milliGRAM(s) Oral every 6 hours PRN Severe Pain (7 - 10)      Physical Exam  General: A&Ox3, NAD  Abdominal:     LABS:    Pending    Patient is a 62y Male POD 3 from open nobles for ischemic colitis  -Attempt regular diet today  -Monitor BP as per cards may increase lisinopril from 2.5 to 5 if needed  -DVT ppx  -OOB PT consult  -Continue DM medications FS controlled wrist pain, bilateral Morning Surgical Progress Note  Patient is a 62y old  Male who presents with a chief complaint of ischemic colitis (28 Aug 2017 11:59)    SUBJECTIVE: Patient seen and examined at bedside with surgical team, patient complains of nausea no vomiting. Advanced to regular diet last night . States he is hungry    Vital Signs Last 24 Hrs  T(C): 37 (12 Sep 2017 05:53), Max: 37 (12 Sep 2017 05:53)  T(F): 98.6 (12 Sep 2017 05:53), Max: 98.6 (12 Sep 2017 05:53)  HR: 86 (12 Sep 2017 05:53) (74 - 89)  BP: 130/89 (12 Sep 2017 05:53) (126/91 - 155/87)  BP(mean): --  RR: 17 (12 Sep 2017 05:53) (16 - 17)  SpO2: 98% (12 Sep 2017 05:53) (97% - 100%)  I&O's Detail    11 Sep 2017 07:01  -  12 Sep 2017 07:00  --------------------------------------------------------  IN:    dextrose 5% + sodium chloride 0.45% with potassium chloride 20 mEq/L: 525 mL    lactated ringers.: 200 mL    Oral Fluid: 1720 mL  Total IN: 2445 mL    OUT:    Colostomy: 875 mL    Voided: 2250 mL  Total OUT: 3125 mL    Total NET: -680 mL        MEDICATIONS  (STANDING):  tamsulosin 0.4 milliGRAM(s) Oral at bedtime  dextrose 50% Injectable 12.5 Gram(s) IV Push once  dextrose 50% Injectable 25 Gram(s) IV Push once  dextrose 50% Injectable 25 Gram(s) IV Push once  insulin lispro (HumaLOG) corrective regimen sliding scale   SubCutaneous three times a day before meals  insulin lispro (HumaLOG) corrective regimen sliding scale   SubCutaneous at bedtime  gabapentin 100 milliGRAM(s) Oral three times a day  enoxaparin Injectable 40 milliGRAM(s) SubCutaneous daily  acetaminophen   Tablet. 650 milliGRAM(s) Oral every 6 hours  lisinopril 2.5 milliGRAM(s) Oral daily  sodium chloride 0.9% lock flush 3 milliLiter(s) IV Push every 8 hours    MEDICATIONS  (PRN):  dextrose Gel 1 Dose(s) Oral once PRN Blood Glucose LESS THAN 70 milliGRAM(s)/deciliter  glucagon  Injectable 1 milliGRAM(s) IntraMuscular once PRN Glucose LESS THAN 70 milligrams/deciliter  vitamin A &amp; D Ointment 1 Application(s) Topical every 3 hours PRN skin irritation  ondansetron Injectable 4 milliGRAM(s) IV Push every 6 hours PRN Nausea  ketorolac   Injectable 15 milliGRAM(s) IV Push every 8 hours PRN Moderate Pain (4 - 6)  oxyCODONE    IR 5 milliGRAM(s) Oral every 3 hours PRN pain  oxyCODONE    IR 10 milliGRAM(s) Oral every 6 hours PRN Severe Pain (7 - 10)      Physical Exam  General: A&Ox3, NAD  Abdominal: soft soft nontender minimal gas in ostomy and stool is present    LABS:    Pending    Patient is a 62y Male POD 3 from open nobles for ischemic colitis  -Attempt regular diet today  -Monitor BP as per cards may increase lisinopril from 2.5 to 5 if needed  -DVT ppx  -OOB PT consult  -Continue DM medications FS controlled

## 2020-10-13 ENCOUNTER — APPOINTMENT (OUTPATIENT)
Dept: ORTHOPEDIC SURGERY | Facility: CLINIC | Age: 65
End: 2020-10-13
Payer: MEDICAID

## 2020-10-13 VITALS
BODY MASS INDEX: 25.39 KG/M2 | HEIGHT: 66 IN | HEART RATE: 85 BPM | SYSTOLIC BLOOD PRESSURE: 126 MMHG | WEIGHT: 158 LBS | DIASTOLIC BLOOD PRESSURE: 85 MMHG | OXYGEN SATURATION: 98 %

## 2020-10-13 DIAGNOSIS — M17.12 UNILATERAL PRIMARY OSTEOARTHRITIS, LEFT KNEE: ICD-10-CM

## 2020-10-13 DIAGNOSIS — Z82.49 FAMILY HISTORY OF ISCHEMIC HEART DISEASE AND OTHER DISEASES OF THE CIRCULATORY SYSTEM: ICD-10-CM

## 2020-10-13 DIAGNOSIS — Z87.39 PERSONAL HISTORY OF OTHER DISEASES OF THE MUSCULOSKELETAL SYSTEM AND CONNECTIVE TISSUE: ICD-10-CM

## 2020-10-13 DIAGNOSIS — Z86.39 PERSONAL HISTORY OF OTHER ENDOCRINE, NUTRITIONAL AND METABOLIC DISEASE: ICD-10-CM

## 2020-10-13 DIAGNOSIS — Z82.61 FAMILY HISTORY OF ARTHRITIS: ICD-10-CM

## 2020-10-13 PROCEDURE — 73562 X-RAY EXAM OF KNEE 3: CPT | Mod: LT

## 2020-10-13 PROCEDURE — 99204 OFFICE O/P NEW MOD 45 MIN: CPT

## 2020-10-13 NOTE — PHYSICAL EXAM
[de-identified] : On general examination the patient is adequately groomed and nourished. The vital parameters are as recorded. \par There is no lymphedema or diffuse swelling, +  varicose veins, no skin warmth/erythema/scars/swelling, no ulcers and no palpable lymph nodes or masses in both lower extremities. Bilateral pedal pulses are well palpable.\par Upper Extremity:\par Both right and left upper extremities are unremarkable in terms of skin rash, lesions, pigmentation, redness, tenderness, swelling, joint instability, abnormal deformity or crepitus. The overall range of motion, sensation, motor tone and strength testing are normal.\par \par Knee Exam\par The gait is left stiff knee antalgic.\par Knee alignment:      varus bilaterally. \par Both knees demonstrate no scars and the skin has no warmth, erythema, swelling or tenderness. \par Both knees have a range of motion of\par Extension:                    Right 0 degrees             Left 0 degrees\par Flexion:                                   Right 125 degrees          Left 110 degrees\par Left Knee: There is medial joint line tenderness. There is mild effusion. \par Lynda's test is positive. Ria test is positive.\par Lachman's test, Anterior/Posterior Drawer test and Pivot Shift Tests are negative. \par There is left knee grade 1 MCL mediolateral laxity and no anteroposterior instability. \par Patella compression test is negative and patellofemoral tracking is normal with no lateral subluxation, apprehension or instability. \par Right knee quadriceps and hamstrings power is 5. Right Knee Exam is normal.\par Left knee quadriceps and hamstrings power is 4+.\par \par Hip Exam:\par The gait and station is normal\par The patient has equal leg lengths and no pelvic tilt. Oseas/Jet test is 7 inches on the right and 7 inches on the left. Active SLR is 60 degrees on the right and 60 degrees on the left. Both hips demonstrate no scars and the skin has no signs of inflammation or tenderness. \par Both Hips have a normal range of motion of flexion to 100 degrees, abduction 40 degrees, adduction 20 degrees, external rotation 40 degrees, internal rotation 20 degrees with symmetrical motion in flexion and extension. There is no flexion contracture, deformity or instability. Labral impingement tests are negative.\par Both hips flexor, abductor and extensor power is normal.\par  [de-identified] : The following radiographs were ordered and read by me during this patients visit. I reviewed each radiograph in detail with the patient and discussed the findings as highlighted below. \par AP, lateral and skyline views of the left knee confirm advanced degenerative joint disease with medial joint space narrowing and osteophyte formation, with AVN medial femoral condyle\par AP view of the pelvis taken previously and provided for review are within normal limits\par

## 2020-10-13 NOTE — CONSULT LETTER
[Dear  ___] : Dear  [unfilled], [Consult Letter:] : I had the pleasure of evaluating your patient, [unfilled]. [Please see my note below.] : Please see my note below. [Consult Closing:] : Thank you very much for allowing me to participate in the care of this patient.  If you have any questions, please do not hesitate to contact me. [Sincerely,] : Sincerely, [FreeTextEntry2] : CJ ALDANA\par  [FreeTextEntry3] : Yang Lin MD\par \par ______________________________________________\par [facility]: Hip/Knee Arthroplasty\par [address], [address], [place] [address]\par (t) [phone]\par (f) [phone]\par

## 2020-10-13 NOTE — DISCUSSION/SUMMARY
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 \par The natural history and treatment of degenerative arthritis and AVN was discussed with the patient at length today. The spectrum of treatment including nonoperative modalities to surgical intervention was elucidated. Noninvasive and nonoperative treatment modalities include weight reduction, activity modification with low impact exercise,  as needed use of acetaminophen or anti-inflammatory medications if tolerated, glucosamine/chondroitin supplements, and physical therapy. Further treatments can include corticosteroid injection and the use of viscosupplementation with hyaluronic acid injections. Definitive surgical treatment can certainly include total joint arthroplasty also. The risks and benefits of each treatment options was discussed and all questions were answered.\par At this time the patient is scheduled for vascular evaluation/procedure, so we will hold from surgical intervention/discussion, but he has been told he will ultimately need a total knee replacement. In the interim, The patient was informed of the findings and recommended conservative management in the form of a home exercise program, activity modifications, stationary bicycling, swimming and weight loss program. A trial of Glucosamine and Chondroiten Sulphate was recommended.\par A prescription for a course of physical therapy was provided.\par Follow-up appointment was recommended in 3 months. \par

## 2020-10-13 NOTE — HISTORY OF PRESENT ILLNESS
[Worsening] : worsening [8] : a current pain level of 8/10 [de-identified] : Mr. RAYNA ALMONTE is a 65 year old male   presenting with left knee pain since January, now worsening. He denies any specific injury. He localizes the pain to the medial anterior aspect of the left knee. He notes his knee has become more stiff as well. He  describes the pain as, and states the pain is present when bearing weight, walking, standing, climbing stairs. He admits to occasional swelling, clicking and grinding of the knee. He notes the left knee has been buckling of the knee. \par He has been treated conservatively with physical therapy, with an injection to the knee as well (cortisone), with no lasting relief. he admits to limitations of quality of life, and is here to discuss options for treatment.\par He has an appointment with a vascular doctor, and is scheduled for a vascular procedure. 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 Farrell.

## 2020-10-16 ENCOUNTER — APPOINTMENT (OUTPATIENT)
Dept: VASCULAR SURGERY | Facility: CLINIC | Age: 65
End: 2020-10-16
Payer: MEDICARE

## 2020-10-16 VITALS — DIASTOLIC BLOOD PRESSURE: 79 MMHG | SYSTOLIC BLOOD PRESSURE: 129 MMHG | HEART RATE: 62 BPM

## 2020-10-16 VITALS
SYSTOLIC BLOOD PRESSURE: 122 MMHG | BODY MASS INDEX: 25.55 KG/M2 | HEART RATE: 62 BPM | WEIGHT: 159 LBS | TEMPERATURE: 98.4 F | HEIGHT: 66 IN | DIASTOLIC BLOOD PRESSURE: 79 MMHG

## 2020-10-16 DIAGNOSIS — K55.039 ACUTE (REVERSIBLE) ISCHEMIA OF LARGE INTESTINE, EXTENT UNSPECIFIED: ICD-10-CM

## 2020-10-16 DIAGNOSIS — M87.052 IDIOPATHIC ASEPTIC NECROSIS OF LEFT FEMUR: ICD-10-CM

## 2020-10-16 PROCEDURE — 99203 OFFICE O/P NEW LOW 30 MIN: CPT

## 2021-10-11 NOTE — DISCHARGE NOTE ADULT - PATIENT PORTAL LINK FT
“You can access the Follow[facility] Patient Portal, offered by [facility], by registering with the following website: [email or web address]” [phone]

## 2022-06-16 ENCOUNTER — NON-APPOINTMENT (OUTPATIENT)
Age: 67
End: 2022-06-16

## 2022-06-17 ENCOUNTER — APPOINTMENT (OUTPATIENT)
Dept: PULMONOLOGY | Facility: CLINIC | Age: 67
End: 2022-06-17
Payer: MEDICAID

## 2022-06-17 VITALS
HEART RATE: 73 BPM | HEIGHT: 66 IN | WEIGHT: 150 LBS | SYSTOLIC BLOOD PRESSURE: 119 MMHG | TEMPERATURE: 98.3 F | BODY MASS INDEX: 24.11 KG/M2 | DIASTOLIC BLOOD PRESSURE: 67 MMHG | OXYGEN SATURATION: 96 %

## 2022-06-17 DIAGNOSIS — N20.0 CALCULUS OF KIDNEY: ICD-10-CM

## 2022-06-17 DIAGNOSIS — U07.1 COVID-19: ICD-10-CM

## 2022-06-17 PROCEDURE — 94727 GAS DIL/WSHOT DETER LNG VOL: CPT

## 2022-06-17 PROCEDURE — 94010 BREATHING CAPACITY TEST: CPT

## 2022-06-17 PROCEDURE — 94729 DIFFUSING CAPACITY: CPT

## 2022-06-17 PROCEDURE — 99204 OFFICE O/P NEW MOD 45 MIN: CPT | Mod: 25

## 2022-06-17 PROCEDURE — ZZZZZ: CPT

## 2022-06-17 RX ORDER — GLIPIZIDE 2.5 MG/1
TABLET ORAL
Refills: 0 | Status: ACTIVE | COMMUNITY

## 2022-06-17 RX ORDER — METFORMIN HYDROCHLORIDE 500 MG/1
500 TABLET, FILM COATED, EXTENDED RELEASE ORAL
Refills: 0 | Status: DISCONTINUED | COMMUNITY
End: 2022-06-17

## 2022-06-17 RX ORDER — VALSARTAN 40 MG/1
40 TABLET, COATED ORAL
Qty: 30 | Refills: 0 | Status: ACTIVE | COMMUNITY
Start: 2022-06-13

## 2022-06-17 RX ORDER — ATORVASTATIN CALCIUM 80 MG/1
TABLET, FILM COATED ORAL
Refills: 0 | Status: ACTIVE | COMMUNITY

## 2022-06-17 NOTE — HISTORY OF PRESENT ILLNESS
[Never] : never [TextBox_4] : RAYNA ALMONTE is a 66 year old male who presents for cough X 1 month\par \[place]\par covid + on 5/20- denies hospitalization, did ok outpatient \par returned to [place] 6/8\par \par denies shortness of breath, no wheezing\par no VTE history\par no GERD\par \par xray done recently with PCP

## 2022-06-17 NOTE — PHYSICAL EXAM
[No Acute Distress] : no acute distress [Well Nourished] : well nourished [Well Developed] : well developed [Normal Rate/Rhythm] : normal rate/rhythm [Normal S1, S2] : normal s1, s2 [No Resp Distress] : no resp distress [No Acc Muscle Use] : no acc muscle use [Clear to Auscultation Bilaterally] : clear to auscultation bilaterally [No Focal Deficits] : no focal deficits [Oriented x3] : oriented x3

## 2022-06-18 LAB — DEPRECATED D DIMER PPP IA-ACNC: <150 NG/ML DDU

## 2023-01-13 ENCOUNTER — APPOINTMENT (OUTPATIENT)
Dept: PULMONOLOGY | Facility: CLINIC | Age: 68
End: 2023-01-13
Payer: MEDICARE

## 2023-01-13 VITALS — OXYGEN SATURATION: 96 % | DIASTOLIC BLOOD PRESSURE: 73 MMHG | HEART RATE: 96 BPM | SYSTOLIC BLOOD PRESSURE: 119 MMHG

## 2023-01-13 PROCEDURE — 99213 OFFICE O/P EST LOW 20 MIN: CPT | Mod: 25

## 2023-01-13 PROCEDURE — 94010 BREATHING CAPACITY TEST: CPT

## 2023-01-15 NOTE — HISTORY OF PRESENT ILLNESS
[Never] : never [TextBox_4] : KAYA ALMONTE is a 67 year old male who presents with cough for about a month that is now improving\par not on any bronchodilators\par denies sick contact\par low grade temp 99F\par now resolved\par \par here for pulm f/u\par \par \par not seen in 6 months\par

## 2023-01-15 NOTE — PHYSICAL EXAM
[Well Nourished] : well nourished [Well Developed] : well developed [No Resp Distress] : no resp distress [No Acc Muscle Use] : no acc muscle use [Clear to Auscultation Bilaterally] : clear to auscultation bilaterally [Oriented x3] : oriented x3

## 2023-01-20 NOTE — PATIENT PROFILE ADULT. - PRESSURE ULCER(S)
----- Message from Leslie Rojas DO sent at 1/20/2023 11:38 AM CST -----  Please notify the patient he does have moderate stool burden in right colon. Increase Miralax to twice a day and add Metamucil 1 tbsp daily.  
Pt was informed of results and recommendations.  
no

## 2023-04-24 ENCOUNTER — LABORATORY RESULT (OUTPATIENT)
Age: 68
End: 2023-04-24

## 2023-04-24 ENCOUNTER — APPOINTMENT (OUTPATIENT)
Dept: PULMONOLOGY | Facility: CLINIC | Age: 68
End: 2023-04-24
Payer: MEDICARE

## 2023-04-24 VITALS
HEART RATE: 83 BPM | BODY MASS INDEX: 23.4 KG/M2 | SYSTOLIC BLOOD PRESSURE: 106 MMHG | WEIGHT: 145 LBS | DIASTOLIC BLOOD PRESSURE: 59 MMHG | OXYGEN SATURATION: 98 %

## 2023-04-24 LAB
ANION GAP SERPL CALC-SCNC: 13 MMOL/L
BUN SERPL-MCNC: 19 MG/DL
CALCIUM SERPL-MCNC: 9.5 MG/DL
CHLORIDE SERPL-SCNC: 98 MMOL/L
CO2 SERPL-SCNC: 27 MMOL/L
CREAT SERPL-MCNC: 0.93 MG/DL
DEPRECATED D DIMER PPP IA-ACNC: <150 NG/ML DDU
EGFR: 90 ML/MIN/1.73M2
GLUCOSE SERPL-MCNC: 247 MG/DL
POTASSIUM SERPL-SCNC: 4.3 MMOL/L
SODIUM SERPL-SCNC: 139 MMOL/L

## 2023-04-24 PROCEDURE — 94010 BREATHING CAPACITY TEST: CPT

## 2023-04-24 PROCEDURE — 71046 X-RAY EXAM CHEST 2 VIEWS: CPT

## 2023-04-24 PROCEDURE — 95012 NITRIC OXIDE EXP GAS DETER: CPT

## 2023-04-24 PROCEDURE — 99214 OFFICE O/P EST MOD 30 MIN: CPT | Mod: 25

## 2023-04-24 PROCEDURE — 36415 COLL VENOUS BLD VENIPUNCTURE: CPT

## 2023-04-25 LAB
A ALTERNATA IGE QN: <0.1 KUA/L
A FUMIGATUS IGE QN: <0.1 KUA/L
BASOPHILS # BLD AUTO: 0.06 K/UL
BASOPHILS NFR BLD AUTO: 0.8 %
C ALBICANS IGE QN: 0.13 KUA/L
C HERBARUM IGE QN: <0.1 KUA/L
CAT DANDER IGE QN: <0.1 KUA/L
CLAM IGE QN: <0.1 KUA/L
CODFISH IGE QN: <0.1 KUA/L
COMMON RAGWEED IGE QN: <0.1 KUA/L
CORN IGE QN: <0.1 KUA/L
COW MILK IGE QN: <0.1 KUA/L
D FARINAE IGE QN: 2.26 KUA/L
D PTERONYSS IGE QN: 0.86 KUA/L
DEPRECATED A ALTERNATA IGE RAST QL: 0
DEPRECATED A FUMIGATUS IGE RAST QL: 0
DEPRECATED C ALBICANS IGE RAST QL: NORMAL
DEPRECATED C HERBARUM IGE RAST QL: 0
DEPRECATED CAT DANDER IGE RAST QL: 0
DEPRECATED CLAM IGE RAST QL: 0
DEPRECATED CODFISH IGE RAST QL: 0
DEPRECATED COMMON RAGWEED IGE RAST QL: 0
DEPRECATED CORN IGE RAST QL: 0
DEPRECATED COW MILK IGE RAST QL: 0
DEPRECATED D FARINAE IGE RAST QL: 2
DEPRECATED D PTERONYSS IGE RAST QL: 2
DEPRECATED DOG DANDER IGE RAST QL: 0
DEPRECATED EGG WHITE IGE RAST QL: 0
DEPRECATED M RACEMOSUS IGE RAST QL: 0
DEPRECATED PEANUT IGE RAST QL: 0
DEPRECATED ROACH IGE RAST QL: NORMAL
DEPRECATED SCALLOP IGE RAST QL: <0.1 KUA/L
DEPRECATED SESAME SEED IGE RAST QL: 0
DEPRECATED SHRIMP IGE RAST QL: 0
DEPRECATED SOYBEAN IGE RAST QL: 0
DEPRECATED TIMOTHY IGE RAST QL: 0
DEPRECATED WALNUT IGE RAST QL: 0
DEPRECATED WHEAT IGE RAST QL: 0
DEPRECATED WHITE OAK IGE RAST QL: 0
DOG DANDER IGE QN: <0.1 KUA/L
EGG WHITE IGE QN: <0.1 KUA/L
EOSINOPHIL # BLD AUTO: 0.35 K/UL
EOSINOPHIL NFR BLD AUTO: 4.5 %
HCT VFR BLD CALC: 38.4 %
HGB BLD-MCNC: 12.3 G/DL
IMM GRANULOCYTES NFR BLD AUTO: 0.8 %
LYMPHOCYTES # BLD AUTO: 1.33 K/UL
LYMPHOCYTES NFR BLD AUTO: 16.9 %
M RACEMOSUS IGE QN: <0.1 KUA/L
MAN DIFF?: NORMAL
MCHC RBC-ENTMCNC: 29.4 PG
MCHC RBC-ENTMCNC: 32 GM/DL
MCV RBC AUTO: 91.9 FL
MONOCYTES # BLD AUTO: 0.73 K/UL
MONOCYTES NFR BLD AUTO: 9.3 %
NEUTROPHILS # BLD AUTO: 5.33 K/UL
NEUTROPHILS NFR BLD AUTO: 67.7 %
NT-PROBNP SERPL-MCNC: 43 PG/ML
PEANUT IGE QN: <0.1 KUA/L
PLATELET # BLD AUTO: 332 K/UL
RBC # BLD: 4.18 M/UL
RBC # FLD: 13.4 %
ROACH IGE QN: 0.24 KUA/L
SCALLOP IGE QN: 0
SCALLOP IGE QN: <0.1 KUA/L
SESAME SEED IGE QN: <0.1 KUA/L
SOYBEAN IGE QN: <0.1 KUA/L
TIMOTHY IGE QN: <0.1 KUA/L
WALNUT IGE QN: <0.1 KUA/L
WBC # FLD AUTO: 7.86 K/UL
WHEAT IGE QN: <0.1 KUA/L
WHITE OAK IGE QN: <0.1 KUA/L

## 2023-04-25 NOTE — PROCEDURE
[FreeTextEntry1] : remains with moderate restriction, no changes from previous \par niox 32\par PA and lateral chest xray performed using standard projections. Bones and soft tissues structures are unremarkable.Cardiac silhouette appears normal. Lung fields are clear. No infiltrate or masses noted. Mediastinal contours are normal.\par IMPRESSION: no acute cardiopulmonary disease\par \par the labs were drawn in the office.\par \par \par \par previous data reviewed:\par \par moderate restriction- improved or stable compared to 6/2022\par \par

## 2023-04-25 NOTE — HISTORY OF PRESENT ILLNESS
[Never] : never [TextBox_4] : KAYA ALMONTE is a 67 year old male who presents for cough eval\par s/p abx by PCP\par still with some cough\par some dyspnea\par \par dry cough when she drinks liquids\par no post nasal drip\par no wheezing

## 2023-04-26 LAB
A ALTERNATA IGE QN: <0.1 KUA/L
A FUMIGATUS IGE QN: <0.1 KUA/L
BERMUDA GRASS IGE QN: <0.1 KUA/L
BOXELDER IGE QN: <0.1 KUA/L
C HERBARUM IGE QN: <0.1 KUA/L
CALIF WALNUT IGE QN: <0.1 KUA/L
CAT DANDER IGE QN: <0.1 KUA/L
CMN PIGWEED IGE QN: <0.1 KUA/L
COMMON RAGWEED IGE QN: <0.1 KUA/L
COTTONWOOD IGE QN: <0.1 KUA/L
D FARINAE IGE QN: 2.26 KUA/L
D PTERONYSS IGE QN: 0.86 KUA/L
DEPRECATED A ALTERNATA IGE RAST QL: 0
DEPRECATED A FUMIGATUS IGE RAST QL: 0
DEPRECATED BERMUDA GRASS IGE RAST QL: 0
DEPRECATED BOXELDER IGE RAST QL: 0
DEPRECATED C HERBARUM IGE RAST QL: 0
DEPRECATED CAT DANDER IGE RAST QL: 0
DEPRECATED COMMON PIGWEED IGE RAST QL: 0
DEPRECATED COMMON RAGWEED IGE RAST QL: 0
DEPRECATED COTTONWOOD IGE RAST QL: 0
DEPRECATED D FARINAE IGE RAST QL: 2
DEPRECATED D PTERONYSS IGE RAST QL: 2
DEPRECATED DOG DANDER IGE RAST QL: 0
DEPRECATED GOOSEFOOT IGE RAST QL: 0
DEPRECATED LONDON PLANE IGE RAST QL: 0
DEPRECATED MOUSE URINE PROT IGE RAST QL: 0
DEPRECATED MUGWORT IGE RAST QL: 0
DEPRECATED P NOTATUM IGE RAST QL: 0
DEPRECATED RED CEDAR IGE RAST QL: 0
DEPRECATED ROACH IGE RAST QL: NORMAL
DEPRECATED SHEEP SORREL IGE RAST QL: 0
DEPRECATED SILVER BIRCH IGE RAST QL: 0
DEPRECATED TIMOTHY IGE RAST QL: 0
DEPRECATED WHITE ASH IGE RAST QL: 0
DEPRECATED WHITE OAK IGE RAST QL: 0
DOG DANDER IGE QN: <0.1 KUA/L
GOOSEFOOT IGE QN: <0.1 KUA/L
LONDON PLANE IGE QN: <0.1 KUA/L
MOUSE URINE PROT IGE QN: <0.1 KUA/L
MUGWORT IGE QN: <0.1 KUA/L
MULBERRY (T70) CLASS: 0
MULBERRY (T70) CONC: <0.1 KUA/L
P NOTATUM IGE QN: <0.1 KUA/L
RED CEDAR IGE QN: <0.1 KUA/L
ROACH IGE QN: 0.24 KUA/L
SHEEP SORREL IGE QN: <0.1 KUA/L
SILVER BIRCH IGE QN: <0.1 KUA/L
TIMOTHY IGE QN: <0.1 KUA/L
TREE ALLERG MIX1 IGE QL: 0
WHITE ASH IGE QN: <0.1 KUA/L
WHITE ELM IGE QN: 0
WHITE ELM IGE QN: <0.1 KUA/L
WHITE OAK IGE QN: <0.1 KUA/L

## 2023-04-28 ENCOUNTER — OUTPATIENT (OUTPATIENT)
Dept: OUTPATIENT SERVICES | Facility: HOSPITAL | Age: 68
LOS: 1 days | End: 2023-04-28
Payer: MEDICARE

## 2023-04-28 ENCOUNTER — APPOINTMENT (OUTPATIENT)
Dept: CT IMAGING | Facility: IMAGING CENTER | Age: 68
End: 2023-04-28
Payer: MEDICARE

## 2023-04-28 DIAGNOSIS — R05.9 COUGH, UNSPECIFIED: ICD-10-CM

## 2023-04-28 PROCEDURE — 71250 CT THORAX DX C-: CPT

## 2023-04-28 PROCEDURE — 71250 CT THORAX DX C-: CPT | Mod: 26

## 2023-05-01 LAB
A FLAVUS AB FLD QL: NEGATIVE
A FUMIGATUS AB FLD QL: NEGATIVE
A NIGER AB FLD QL: NEGATIVE

## 2023-05-11 ENCOUNTER — APPOINTMENT (OUTPATIENT)
Dept: GASTROENTEROLOGY | Facility: CLINIC | Age: 68
End: 2023-05-11

## 2023-05-22 ENCOUNTER — APPOINTMENT (OUTPATIENT)
Dept: PULMONOLOGY | Facility: CLINIC | Age: 68
End: 2023-05-22
Payer: MEDICARE

## 2023-05-22 VITALS — HEART RATE: 68 BPM | SYSTOLIC BLOOD PRESSURE: 132 MMHG | OXYGEN SATURATION: 100 % | DIASTOLIC BLOOD PRESSURE: 71 MMHG

## 2023-05-22 DIAGNOSIS — R05.9 COUGH, UNSPECIFIED: ICD-10-CM

## 2023-05-22 PROCEDURE — 99213 OFFICE O/P EST LOW 20 MIN: CPT

## 2023-05-22 NOTE — PROCEDURE
[FreeTextEntry1] : CT HRCT- no evidence of ILD- 4/2023 [facility]\par \par previous data reviewed:\par \par \par 4/2023-remains with moderate restriction, no changes from previous \par niox 32\par PA and lateral chest xray performed using standard projections. Bones and soft tissues structures are unremarkable.Cardiac silhouette appears normal. Lung fields are clear. No infiltrate or masses noted. Mediastinal contours are normal.\par IMPRESSION: no acute cardiopulmonary disease\par  \par \par \par moderate restriction- improved or stable compared to 6/2022\par \par

## 2023-05-22 NOTE — HISTORY OF PRESENT ILLNESS
[Never] : never [TextBox_4] : KAYA ALMONTE is a 67 year old male who presents for f/u on CT chest results\par CT was done for chronic cough eval\par \par he is abx by PCP\par had a cough, we did rx PPI but he did not take it\par \par today says cough is resolved\par denies GERD\par denies pos nasal drip\par no dyspnea today\par \par   \par Smoking Status: never \par

## 2024-03-19 NOTE — PROGRESS NOTE ADULT - SUBJECTIVE AND OBJECTIVE BOX
RETURN OB 35-41 WGA      GA: 38w0d  Vitals:    24 1552   BP: 120/74   Pulse: 107   Weight: 158 lb (71.7 kg)   Height: 62\"     Doing well, +FM  Denies LOF/VB/uctx  Mode of delivery:   anticipated  SVE /-2   GBS neg  Fetal movement count given  Labor precautions provided   Cephalic on exam    Patient Active Problem List    Diagnosis    Elevated blood pressure affecting pregnancy in third trimester, antepartum (HCC)     3/12: /91 at infusion center   PI labs wnl. Asymptomatic. 2+ edema      Vomiting of pregnancy (HCC)    Hyperemesis affecting pregnancy, antepartum (HCC)    Small for gestational age (HCC)     30 weeks.  Borderline, with poor weight gain due to recurrent N,V.    [ x ] Check growth - 39% EFW       Nausea and vomiting during pregnancy (HCC)     Recurrent 30 wks, severe.  Had HEG first trimester.  Zofran, Pepcid, OB triage  Trial of Reglan.  If no relief, referral GI-->receiving IVF      Supervision of normal first pregnancy, antepartum (HCC)    Cervical dysplasia    History of cold sores     Starting Valtrex 37wks due to current outbreak.      Anxiety    Hypertriglyceridemia    Other allergy, other than to medicinal agents           RTC 1 week      Note to patient and family   The 21st Century Cures Act makes medical notes available to patients in the interest of transparency.  However, please be advised that this is a medical document.  It is intended as [id number] communication.  It is written and medical language may contain abbreviations or verbiage that are technical and unfamiliar.  It may appear blunt or direct.  Medical documents are intended to carry relevant information, facts as evident, and the clinical opinion of the practitioner.    Finn Kelley MD         Patient is a 62y old  Male who presents with a chief complaint of ischemic colitis (28 Aug 2017 11:59)      SUBJECTIVE / OVERNIGHT EVENTS: patient seen and examined by bedside, feels better , still c/o crampy abdominal pain , had 2 loose bm       MEDICATIONS  (STANDING):  tamsulosin 0.4 milliGRAM(s) Oral at bedtime  dextrose 5%. 1000 milliLiter(s) (50 mL/Hr) IV Continuous <Continuous>  dextrose 50% Injectable 12.5 Gram(s) IV Push once  dextrose 50% Injectable 25 Gram(s) IV Push once  dextrose 50% Injectable 25 Gram(s) IV Push once  sodium biphosphate Rectal Enema 1 Enema Rectal once  insulin lispro (HumaLOG) corrective regimen sliding scale   SubCutaneous three times a day before meals  insulin lispro (HumaLOG) corrective regimen sliding scale   SubCutaneous at bedtime  gabapentin 100 milliGRAM(s) Oral three times a day  enoxaparin Injectable 40 milliGRAM(s) SubCutaneous daily  lisinopril 2.5 milliGRAM(s) Oral daily  ciprofloxacin     Tablet 500 milliGRAM(s) Oral every 12 hours  metroNIDAZOLE    Tablet 500 milliGRAM(s) Oral every 8 hours    MEDICATIONS  (PRN):  acetaminophen   Tablet 650 milliGRAM(s) Oral every 6 hours PRN For Temp greater than 38 C (100.4 F)  acetaminophen   Tablet. 650 milliGRAM(s) Oral every 6 hours PRN Mild Pain (1 - 3)  dextrose Gel 1 Dose(s) Oral once PRN Blood Glucose LESS THAN 70 milliGRAM(s)/deciliter  glucagon  Injectable 1 milliGRAM(s) IntraMuscular once PRN Glucose LESS THAN 70 milligrams/deciliter  morphine  - Injectable 2 milliGRAM(s) IV Push every 4 hours PRN Moderate Pain (4 - 6)  melatonin 3 milliGRAM(s) Oral at bedtime PRN Insomnia  simethicone 80 milliGRAM(s) Chew every 6 hours PRN Gas      Vital Signs Last 24 Hrs  T(C): 36.9 (03 Sep 2017 13:16), Max: 37.3 (03 Sep 2017 05:38)  T(F): 98.5 (03 Sep 2017 13:16), Max: 99.1 (03 Sep 2017 05:38)  HR: 88 (03 Sep 2017 13:16) (83 - 88)  BP: 129/89 (03 Sep 2017 13:16) (114/72 - 133/60)  BP(mean): --  RR: 18 (03 Sep 2017 13:16) (16 - 18)  SpO2: 100% (03 Sep 2017 13:16) (97% - 100%)  CAPILLARY BLOOD GLUCOSE  250 (03 Sep 2017 12:20)  150 (03 Sep 2017 08:20)  147 (02 Sep 2017 21:55)  128 (02 Sep 2017 16:41)        I&O's Summary      PHYSICAL EXAM:  GENERAL: NAD, well-developed  HEAD:  Atraumatic, Normocephalic  EYES: EOMI, PERRLA, conjunctiva and sclera clear  NECK: Supple, No JVD  CHEST/LUNG: Clear to auscultation bilaterally; No wheeze  HEART: Regular rate and rhythm; No murmurs, rubs, or gallops  ABDOMEN: Soft, Nontender, Nondistended; Bowel sounds present  EXTREMITIES:  2+ Peripheral Pulses, No clubbing, cyanosis, or edema  PSYCH: AAOx3  NEUROLOGY: non-focal  SKIN: No rashes or lesions    LABS:                        10.4   8.40  )-----------( 449      ( 03 Sep 2017 06:21 )             30.9     09-03    136  |  98  |  4<L>  ----------------------------<  169<H>  3.7   |  24  |  0.56    Ca    8.5      03 Sep 2017 06:21  Phos  3.0     09-03  Mg     1.8     09-03                RADIOLOGY & ADDITIONAL TESTS:    Imaging Personally Reviewed:    Consultant(s) Notes Reviewed:      Care Discussed with Consultants/Other Providers:

## 2025-01-08 NOTE — PATIENT PROFILE ADULT. - PRO ANTICIPATED DISCH DISP
Medication failed Protocol.    eGFR resulted within last 12 months -- IF CRITERIA FAILED REFER TO PROTOCOL DETAILS    Lipid Panel resulted within last 15 months    Medication (including dose and sig) on current meds list    eGFR greater than 14 within last 12 months looking at last value     Rx request for: semaglutide-Weight Management (Wegovy) 1.7 MG/0.75ML injection     Last Sent- 8/19/2024, 9ml, 1 refills.    Last OV- 3/2/2024  Next OV- 3/1/2025    Labs: due on or after 3-2-2025.     Routed to MD.       
Rachel Myles   Payer: [employer] Phone number: [phone] Fax number: [phone]     
home

## 2025-05-05 NOTE — H&P PST ADULT - NSANTHOSAYNRD_GEN_A_CORE
5/5/2025      Aminah Henriquez  [address]  [facility] [address]      Dear Colleague,    Thank you for referring your patient, Aminah Henriquez, to the [facility]. Please see a copy of my visit note below.    No notes on file    Again, thank you for allowing me to participate in the care of your patient.        Sincerely,        Reinaldo Silva MD    Electronically signed No. TAMIKO screening performed.  STOP BANG Legend: 0-2 = LOW Risk; 3-4 = INTERMEDIATE Risk; 5-8 = HIGH Risk

## 2025-05-20 ENCOUNTER — NON-APPOINTMENT (OUTPATIENT)
Age: 70
End: 2025-05-20

## 2025-05-22 ENCOUNTER — APPOINTMENT (OUTPATIENT)
Dept: ORTHOPEDIC SURGERY | Facility: CLINIC | Age: 70
End: 2025-05-22
Payer: MEDICARE

## 2025-05-22 VITALS — BODY MASS INDEX: 25.39 KG/M2 | WEIGHT: 158 LBS | HEIGHT: 66 IN

## 2025-05-22 DIAGNOSIS — M17.12 UNILATERAL PRIMARY OSTEOARTHRITIS, LEFT KNEE: ICD-10-CM

## 2025-05-22 PROCEDURE — 99205 OFFICE O/P NEW HI 60 MIN: CPT

## 2025-06-02 ENCOUNTER — NON-APPOINTMENT (OUTPATIENT)
Age: 70
End: 2025-06-02

## 2025-06-03 ENCOUNTER — NON-APPOINTMENT (OUTPATIENT)
Age: 70
End: 2025-06-03

## 2025-06-23 ENCOUNTER — OUTPATIENT (OUTPATIENT)
Dept: OUTPATIENT SERVICES | Facility: HOSPITAL | Age: 70
LOS: 1 days | End: 2025-06-23
Payer: MEDICARE

## 2025-06-23 VITALS
OXYGEN SATURATION: 97 % | RESPIRATION RATE: 18 BRPM | WEIGHT: 158.07 LBS | HEART RATE: 59 BPM | TEMPERATURE: 98 F | DIASTOLIC BLOOD PRESSURE: 79 MMHG | SYSTOLIC BLOOD PRESSURE: 133 MMHG | HEIGHT: 64 IN

## 2025-06-23 DIAGNOSIS — I10 ESSENTIAL (PRIMARY) HYPERTENSION: ICD-10-CM

## 2025-06-23 DIAGNOSIS — M17.12 UNILATERAL PRIMARY OSTEOARTHRITIS, LEFT KNEE: ICD-10-CM

## 2025-06-23 DIAGNOSIS — Z98.890 OTHER SPECIFIED POSTPROCEDURAL STATES: Chronic | ICD-10-CM

## 2025-06-23 DIAGNOSIS — Z90.49 ACQUIRED ABSENCE OF OTHER SPECIFIED PARTS OF DIGESTIVE TRACT: Chronic | ICD-10-CM

## 2025-06-23 DIAGNOSIS — Z01.818 ENCOUNTER FOR OTHER PREPROCEDURAL EXAMINATION: ICD-10-CM

## 2025-06-23 DIAGNOSIS — E11.9 TYPE 2 DIABETES MELLITUS WITHOUT COMPLICATIONS: ICD-10-CM

## 2025-06-23 LAB
A1C WITH ESTIMATED AVERAGE GLUCOSE RESULT: 7.8 % — HIGH (ref 4–5.6)
ANION GAP SERPL CALC-SCNC: 12 MMOL/L — SIGNIFICANT CHANGE UP (ref 5–17)
BUN SERPL-MCNC: 18 MG/DL — SIGNIFICANT CHANGE UP (ref 7–23)
CALCIUM SERPL-MCNC: 9.6 MG/DL — SIGNIFICANT CHANGE UP (ref 8.4–10.5)
CHLORIDE SERPL-SCNC: 104 MMOL/L — SIGNIFICANT CHANGE UP (ref 96–108)
CO2 SERPL-SCNC: 24 MMOL/L — SIGNIFICANT CHANGE UP (ref 22–31)
CREAT SERPL-MCNC: 0.84 MG/DL — SIGNIFICANT CHANGE UP (ref 0.5–1.3)
EGFR: 94 ML/MIN/1.73M2 — SIGNIFICANT CHANGE UP
EGFR: 94 ML/MIN/1.73M2 — SIGNIFICANT CHANGE UP
ESTIMATED AVERAGE GLUCOSE: 177 MG/DL — HIGH (ref 68–114)
GLUCOSE SERPL-MCNC: 117 MG/DL — HIGH (ref 70–99)
HCT VFR BLD CALC: 39.3 % — SIGNIFICANT CHANGE UP (ref 39–50)
HGB BLD-MCNC: 12.8 G/DL — LOW (ref 13–17)
MCHC RBC-ENTMCNC: 29.2 PG — SIGNIFICANT CHANGE UP (ref 27–34)
MCHC RBC-ENTMCNC: 32.6 G/DL — SIGNIFICANT CHANGE UP (ref 32–36)
MCV RBC AUTO: 89.5 FL — SIGNIFICANT CHANGE UP (ref 80–100)
MRSA PCR RESULT.: SIGNIFICANT CHANGE UP
NRBC # BLD AUTO: 0 K/UL — SIGNIFICANT CHANGE UP (ref 0–0)
NRBC # FLD: 0 K/UL — SIGNIFICANT CHANGE UP (ref 0–0)
NRBC BLD AUTO-RTO: 0 /100 WBCS — SIGNIFICANT CHANGE UP (ref 0–0)
PLATELET # BLD AUTO: 282 K/UL — SIGNIFICANT CHANGE UP (ref 150–400)
PMV BLD: 9.2 FL — SIGNIFICANT CHANGE UP (ref 7–13)
POTASSIUM SERPL-MCNC: 4 MMOL/L — SIGNIFICANT CHANGE UP (ref 3.5–5.3)
POTASSIUM SERPL-SCNC: 4 MMOL/L — SIGNIFICANT CHANGE UP (ref 3.5–5.3)
RBC # BLD: 4.39 M/UL — SIGNIFICANT CHANGE UP (ref 4.2–5.8)
RBC # FLD: 13.3 % — SIGNIFICANT CHANGE UP (ref 10.3–14.5)
S AUREUS DNA NOSE QL NAA+PROBE: SIGNIFICANT CHANGE UP
SODIUM SERPL-SCNC: 140 MMOL/L — SIGNIFICANT CHANGE UP (ref 135–145)
WBC # BLD: 5.52 K/UL — SIGNIFICANT CHANGE UP (ref 3.8–10.5)
WBC # FLD AUTO: 5.52 K/UL — SIGNIFICANT CHANGE UP (ref 3.8–10.5)

## 2025-06-23 PROCEDURE — G0463: CPT

## 2025-06-23 PROCEDURE — 80048 BASIC METABOLIC PNL TOTAL CA: CPT

## 2025-06-23 PROCEDURE — 83036 HEMOGLOBIN GLYCOSYLATED A1C: CPT

## 2025-06-23 PROCEDURE — 87640 STAPH A DNA AMP PROBE: CPT

## 2025-06-23 PROCEDURE — 87641 MR-STAPH DNA AMP PROBE: CPT

## 2025-06-23 PROCEDURE — 85027 COMPLETE CBC AUTOMATED: CPT

## 2025-06-23 RX ORDER — CEFAZOLIN SODIUM IN 0.9 % NACL 3 G/100 ML
2000 INTRAVENOUS SOLUTION, PIGGYBACK (ML) INTRAVENOUS ONCE
Refills: 0 | Status: COMPLETED | OUTPATIENT
Start: 2025-07-14 | End: 2025-07-14

## 2025-06-23 RX ORDER — TRAMADOL HYDROCHLORIDE 50 MG/1
50 TABLET, FILM COATED ORAL ONCE
Refills: 0 | Status: DISCONTINUED | OUTPATIENT
Start: 2025-07-14 | End: 2025-07-14

## 2025-06-23 RX ORDER — LIDOCAINE HCL/PF 10 MG/ML
0.2 VIAL (ML) INJECTION ONCE
Refills: 0 | Status: DISCONTINUED | OUTPATIENT
Start: 2025-07-14 | End: 2025-07-14

## 2025-06-23 NOTE — H&P PST ADULT - NSICDXPASTMEDICALHX_GEN_ALL_CORE_FT
PAST MEDICAL HISTORY:  BPH (benign prostatic hyperplasia)     Diabetes mellitus type 2, noninsulin dependent     HLD (hyperlipidemia)     HTN (hypertension)     Ischemic colitis

## 2025-06-23 NOTE — H&P PST ADULT - PROBLEM SELECTOR PLAN 1
Scheduled for left total knee arthroplasty with paulo robot on 7/14/25 with Dr. Shivam Nava.  Preop instructions and chlorh was provided.  Questions answered.  CBC, BMP, A1C, MRSA done today. Scheduled for left total knee arthroplasty with paulo robot on 7/14/25 with Dr. Shivam Nava.  Preop instructions and chlorh was provided.  Questions answered.  CBC, BMP, A1C, MRSA done today.  Pt not yet approve for CT left knee paulo protocol as per Archana Barker. Patient made aware.

## 2025-06-23 NOTE — H&P PST ADULT - ASSESSMENT
DASI score:  DASI activity:  Loose teeth or denture:      CAPRINI SCORE    AGE RELATED RISK FACTORS                                                             [ ] Age 41-60 years                                            (1 Point)  [ x] Age: 61-74 years                                           (2 Points)                 [ ] Age= 75 years                                                (3 Points)             DISEASE RELATED RISK FACTORS                                                       [ ] Edema in the lower extremities                 (1 Point)                     [ ] Varicose veins                                               (1 Point)                                 [x ] BMI > 25 Kg/m2                                            (1 Point)                                  [ ] Serious infection (ie PNA)                            (1 Point)                     [ ] Lung disease ( COPD, Emphysema)            (1 Point)                                                                          [ ] Acute myocardial infarction                         (1 Point)                  [ ] Congestive heart failure (in the previous month)  (1 Point)         [ ] Inflammatory bowel disease                            (1 Point)                  [ ] Central venous access, PICC or Port               (2 points)       (within the last month)                                                                [ ] Stroke (in the previous month)                        (5 Points)    [ ] Previous or present malignancy                       (2 points)                                                                                                                                                         HEMATOLOGY RELATED FACTORS                                                         [ ] Prior episodes of VTE                                     (3 Points)                     [ ] Positive family history for VTE                      (3 Points)                  [ ] Prothrombin 37035 A                                     (3 Points)                     [ ] Factor V Leiden                                                (3 Points)                        [ ] Lupus anticoagulants                                      (3 Points)                                                           [ ] Anticardiolipin antibodies                              (3 Points)                                                       [ ] High homocysteine in the blood                   (3 Points)                                             [ ] Other congenital or acquired thrombophilia      (3 Points)                                                [ ] Heparin induced thrombocytopenia                  (3 Points)                                        MOBILITY RELATED FACTORS  [ ] Bed rest                                                         (1 Point)  [ ] Plaster cast                                                    (2 points)  [ ] Bed bound for more than 72 hours           (2 Points)    GENDER SPECIFIC FACTORS  [ ] Pregnancy or had a baby within the last month   (1 Point)  [ ] Post-partum < 6 weeks                                   (1 Point)  [ ] Hormonal therapy  or oral contraception   (1 Point)  [ ] History of pregnancy complications              (1 point)  [ ] Unexplained or recurrent              (1 Point)    OTHER RISK FACTORS                                           (1 Point)  [ ] BMI >40, smoking, diabetes requiring insulin, chemotherapy  blood transfusions and length of surgery over 2 hours    SURGERY RELATED RISK FACTORS  [ ]  Section within the last month     (1 Point)  [ ] Minor surgery                                                  (1 Point)  [ ] Arthroscopic surgery                                       (2 Points)  [ ] Planned major surgery lasting more            (2 Points)      than 45 minutes     [ x] Elective hip or knee joint replacement       (5 points)       surgery                                                TRAUMA RELATED RISK FACTORS  [ ] Fracture of the hip, pelvis, or leg                       (5 Points)  [ ] Spinal cord injury resulting in paralysis             (5 points)       (in the previous month)    [ ] Paralysis  (less than 1 month)                             (5 Points)  [ ] Multiple Trauma within 1 month                        (5 Points)    Total Score [  8     ]    Caprini Score 0-2: Low Risk, NO VTE prophylaxis required for most patients, encourage ambulation  Caprini Score 3-6: Moderate Risk , pharmacologic VTE prophylaxis is indicated for most patients (in the absence of contraindications)  Caprini Score Greater than or =7: High risk, pharmocologic VTE prophylaxis indicated for most patients (in the absence of contraindications)

## 2025-06-23 NOTE — H&P PST ADULT - WEIGHT IN KG
Outside INR: 2.5 on 7/8/19  Received from [facility] via fax.    Faxed results sent to scan.      71.7

## 2025-06-23 NOTE — H&P PST ADULT - NSANTHOSAYNRD_GEN_A_CORE
No. TAMIKO screening performed.  STOP BANG Legend: 0-2 = LOW Risk; 3-4 = INTERMEDIATE Risk; 5-8 = HIGH Risk

## 2025-06-23 NOTE — H&P PST ADULT - HISTORY OF PRESENT ILLNESS
70 year old male presents to PST for scheduled left total knee arthroplasty with paulo robot byt Dr. Shivam Nava on 7/14/25. PMH DM, HTN, HLD, BPH, acute ischemic colitis, s/p partial colectomy with colostomy 9/2017 with reversal 1/2018, kidney stones s/p lithotripsy x2. Denies N/V, SOB, KELLY, chest pain or palpitations.

## 2025-06-23 NOTE — H&P PST ADULT - OTHER CARE PROVIDERS
Cardiac- Huber SilverCqaxd-[phone]- next appt- 7/7/25, Uro- Cristian Lynch- [phone]- last visit- 5/2025

## 2025-07-02 PROBLEM — N40.0 BENIGN PROSTATIC HYPERPLASIA WITHOUT LOWER URINARY TRACT SYMPTOMS: Chronic | Status: ACTIVE | Noted: 2025-06-23

## 2025-07-02 PROBLEM — E78.5 HYPERLIPIDEMIA, UNSPECIFIED: Chronic | Status: ACTIVE | Noted: 2025-06-23

## 2025-07-02 PROBLEM — I10 ESSENTIAL (PRIMARY) HYPERTENSION: Chronic | Status: ACTIVE | Noted: 2025-06-23

## 2025-07-03 ENCOUNTER — OUTPATIENT (OUTPATIENT)
Dept: OUTPATIENT SERVICES | Facility: HOSPITAL | Age: 70
LOS: 1 days | End: 2025-07-03
Payer: MEDICARE

## 2025-07-03 ENCOUNTER — APPOINTMENT (OUTPATIENT)
Dept: CT IMAGING | Facility: CLINIC | Age: 70
End: 2025-07-03
Payer: MEDICARE

## 2025-07-03 DIAGNOSIS — Z00.8 ENCOUNTER FOR OTHER GENERAL EXAMINATION: ICD-10-CM

## 2025-07-03 DIAGNOSIS — Z98.890 OTHER SPECIFIED POSTPROCEDURAL STATES: Chronic | ICD-10-CM

## 2025-07-03 DIAGNOSIS — M17.12 UNILATERAL PRIMARY OSTEOARTHRITIS, LEFT KNEE: ICD-10-CM

## 2025-07-03 DIAGNOSIS — Z90.49 ACQUIRED ABSENCE OF OTHER SPECIFIED PARTS OF DIGESTIVE TRACT: Chronic | ICD-10-CM

## 2025-07-03 PROCEDURE — 73700 CT LOWER EXTREMITY W/O DYE: CPT | Mod: 26,LT

## 2025-07-03 PROCEDURE — 73700 CT LOWER EXTREMITY W/O DYE: CPT

## 2025-07-04 ENCOUNTER — NON-APPOINTMENT (OUTPATIENT)
Age: 70
End: 2025-07-04

## 2025-07-08 ENCOUNTER — NON-APPOINTMENT (OUTPATIENT)
Age: 70
End: 2025-07-08

## 2025-07-14 ENCOUNTER — OUTPATIENT (OUTPATIENT)
Dept: INPATIENT UNIT | Facility: HOSPITAL | Age: 70
LOS: 1 days | End: 2025-07-14
Payer: MEDICARE

## 2025-07-14 ENCOUNTER — TRANSCRIPTION ENCOUNTER (OUTPATIENT)
Age: 70
End: 2025-07-14

## 2025-07-14 ENCOUNTER — APPOINTMENT (OUTPATIENT)
Dept: ORTHOPEDIC SURGERY | Facility: HOSPITAL | Age: 70
End: 2025-07-14

## 2025-07-14 VITALS
TEMPERATURE: 98 F | SYSTOLIC BLOOD PRESSURE: 134 MMHG | HEIGHT: 64.02 IN | OXYGEN SATURATION: 96 % | RESPIRATION RATE: 16 BRPM | HEART RATE: 58 BPM | DIASTOLIC BLOOD PRESSURE: 76 MMHG | WEIGHT: 158.07 LBS

## 2025-07-14 DIAGNOSIS — M17.12 UNILATERAL PRIMARY OSTEOARTHRITIS, LEFT KNEE: ICD-10-CM

## 2025-07-14 DIAGNOSIS — Z98.890 OTHER SPECIFIED POSTPROCEDURAL STATES: Chronic | ICD-10-CM

## 2025-07-14 DIAGNOSIS — Z90.49 ACQUIRED ABSENCE OF OTHER SPECIFIED PARTS OF DIGESTIVE TRACT: Chronic | ICD-10-CM

## 2025-07-14 LAB
GLUCOSE BLDC GLUCOMTR-MCNC: 121 MG/DL — HIGH (ref 70–99)
GLUCOSE BLDC GLUCOMTR-MCNC: 132 MG/DL — HIGH (ref 70–99)
GLUCOSE BLDC GLUCOMTR-MCNC: 185 MG/DL — HIGH (ref 70–99)
GLUCOSE BLDC GLUCOMTR-MCNC: 256 MG/DL — HIGH (ref 70–99)

## 2025-07-14 PROCEDURE — 97161 PT EVAL LOW COMPLEX 20 MIN: CPT

## 2025-07-14 PROCEDURE — 80048 BASIC METABOLIC PNL TOTAL CA: CPT

## 2025-07-14 PROCEDURE — C1889: CPT

## 2025-07-14 PROCEDURE — C1713: CPT

## 2025-07-14 PROCEDURE — 97530 THERAPEUTIC ACTIVITIES: CPT

## 2025-07-14 PROCEDURE — S2900: CPT

## 2025-07-14 PROCEDURE — 73562 X-RAY EXAM OF KNEE 3: CPT

## 2025-07-14 PROCEDURE — 82962 GLUCOSE BLOOD TEST: CPT

## 2025-07-14 PROCEDURE — 97116 GAIT TRAINING THERAPY: CPT

## 2025-07-14 PROCEDURE — 85027 COMPLETE CBC AUTOMATED: CPT

## 2025-07-14 PROCEDURE — C1776: CPT

## 2025-07-14 PROCEDURE — 97165 OT EVAL LOW COMPLEX 30 MIN: CPT

## 2025-07-14 DEVICE — MAKO BONE PIN 4MM X 110MM: Type: IMPLANTABLE DEVICE | Site: LEFT | Status: FUNCTIONAL

## 2025-07-14 DEVICE — MAKO BONE PIN 4MM X 140MM: Type: IMPLANTABLE DEVICE | Site: LEFT | Status: FUNCTIONAL

## 2025-07-14 DEVICE — BASEPLATE TIB TRIATHLON TRITAN SZ 6: Type: IMPLANTABLE DEVICE | Site: LEFT | Status: FUNCTIONAL

## 2025-07-14 DEVICE — ARISTA 1GR: Type: IMPLANTABLE DEVICE | Site: LEFT | Status: FUNCTIONAL

## 2025-07-14 DEVICE — PATELLA TRIATHLON ASSYM SZ A3X10MM: Type: IMPLANTABLE DEVICE | Site: LEFT | Status: FUNCTIONAL

## 2025-07-14 DEVICE — COMP FEM CR CMNTLSS BEADED W/ PA SZ 5 LT: Type: IMPLANTABLE DEVICE | Site: LEFT | Status: FUNCTIONAL

## 2025-07-14 DEVICE — INSERT TIB BEARING CS X3 SZ 6 10MM: Type: IMPLANTABLE DEVICE | Site: LEFT | Status: FUNCTIONAL

## 2025-07-14 RX ORDER — GLUCAGON 3 MG/1
1 POWDER NASAL ONCE
Refills: 0 | Status: DISCONTINUED | OUTPATIENT
Start: 2025-07-14 | End: 2025-07-15

## 2025-07-14 RX ORDER — MAGNESIUM HYDROXIDE 400 MG/5ML
30 SUSPENSION ORAL DAILY
Refills: 0 | Status: DISCONTINUED | OUTPATIENT
Start: 2025-07-14 | End: 2025-07-15

## 2025-07-14 RX ORDER — ACETAMINOPHEN 500 MG/5ML
1000 LIQUID (ML) ORAL ONCE
Refills: 0 | Status: COMPLETED | OUTPATIENT
Start: 2025-07-14 | End: 2025-07-14

## 2025-07-14 RX ORDER — CEFAZOLIN SODIUM IN 0.9 % NACL 3 G/100 ML
2000 INTRAVENOUS SOLUTION, PIGGYBACK (ML) INTRAVENOUS EVERY 8 HOURS
Refills: 0 | Status: COMPLETED | OUTPATIENT
Start: 2025-07-14 | End: 2025-07-15

## 2025-07-14 RX ORDER — ATORVASTATIN CALCIUM 80 MG/1
20 TABLET, FILM COATED ORAL AT BEDTIME
Refills: 0 | Status: DISCONTINUED | OUTPATIENT
Start: 2025-07-14 | End: 2025-07-15

## 2025-07-14 RX ORDER — ACETAMINOPHEN 500 MG/5ML
975 LIQUID (ML) ORAL EVERY 8 HOURS
Refills: 0 | Status: DISCONTINUED | OUTPATIENT
Start: 2025-07-15 | End: 2025-07-15

## 2025-07-14 RX ORDER — INSULIN LISPRO 100 U/ML
INJECTION, SOLUTION INTRAVENOUS; SUBCUTANEOUS
Refills: 0 | Status: DISCONTINUED | OUTPATIENT
Start: 2025-07-14 | End: 2025-07-15

## 2025-07-14 RX ORDER — POLYETHYLENE GLYCOL 3350 17 G/17G
17 POWDER, FOR SOLUTION ORAL AT BEDTIME
Refills: 0 | Status: DISCONTINUED | OUTPATIENT
Start: 2025-07-14 | End: 2025-07-15

## 2025-07-14 RX ORDER — CELECOXIB 50 MG/1
200 CAPSULE ORAL EVERY 12 HOURS
Refills: 0 | Status: DISCONTINUED | OUTPATIENT
Start: 2025-07-15 | End: 2025-07-15

## 2025-07-14 RX ORDER — ATORVASTATIN CALCIUM 80 MG/1
1 TABLET, FILM COATED ORAL
Refills: 0 | DISCHARGE

## 2025-07-14 RX ORDER — TAMSULOSIN HYDROCHLORIDE 0.4 MG/1
0.4 CAPSULE ORAL
Refills: 0 | Status: DISCONTINUED | OUTPATIENT
Start: 2025-07-14 | End: 2025-07-15

## 2025-07-14 RX ORDER — PIOGLITAZONE HYDROCHLORIDE 15 MG/1
1 TABLET ORAL
Refills: 0 | DISCHARGE

## 2025-07-14 RX ORDER — ASPIRIN 325 MG
81 TABLET ORAL
Refills: 0 | Status: DISCONTINUED | OUTPATIENT
Start: 2025-07-14 | End: 2025-07-15

## 2025-07-14 RX ORDER — DEXTROSE 50 % IN WATER 50 %
25 SYRINGE (ML) INTRAVENOUS ONCE
Refills: 0 | Status: DISCONTINUED | OUTPATIENT
Start: 2025-07-14 | End: 2025-07-15

## 2025-07-14 RX ORDER — TAMSULOSIN HYDROCHLORIDE 0.4 MG/1
1 CAPSULE ORAL
Refills: 0 | DISCHARGE

## 2025-07-14 RX ORDER — KETOROLAC TROMETHAMINE 30 MG/ML
15 INJECTION, SOLUTION INTRAMUSCULAR; INTRAVENOUS EVERY 6 HOURS
Refills: 0 | Status: COMPLETED | OUTPATIENT
Start: 2025-07-14 | End: 2025-07-15

## 2025-07-14 RX ORDER — TAMSULOSIN HYDROCHLORIDE 0.4 MG/1
0.4 CAPSULE ORAL AT BEDTIME
Refills: 0 | Status: DISCONTINUED | OUTPATIENT
Start: 2025-07-14 | End: 2025-07-14

## 2025-07-14 RX ORDER — FENTANYL CITRATE-0.9 % NACL/PF 100MCG/2ML
25 SYRINGE (ML) INTRAVENOUS
Refills: 0 | Status: DISCONTINUED | OUTPATIENT
Start: 2025-07-14 | End: 2025-07-14

## 2025-07-14 RX ORDER — OXYCODONE HYDROCHLORIDE AND ACETAMINOPHEN 10; 325 MG/1; MG/1
1 TABLET ORAL ONCE
Refills: 0 | Status: DISCONTINUED | OUTPATIENT
Start: 2025-07-14 | End: 2025-07-14

## 2025-07-14 RX ORDER — SODIUM CHLORIDE 9 G/1000ML
1000 INJECTION, SOLUTION INTRAVENOUS
Refills: 0 | Status: DISCONTINUED | OUTPATIENT
Start: 2025-07-14 | End: 2025-07-15

## 2025-07-14 RX ORDER — OXYCODONE HYDROCHLORIDE 30 MG/1
10 TABLET ORAL EVERY 4 HOURS
Refills: 0 | Status: DISCONTINUED | OUTPATIENT
Start: 2025-07-14 | End: 2025-07-15

## 2025-07-14 RX ORDER — DEXTROSE 50 % IN WATER 50 %
12.5 SYRINGE (ML) INTRAVENOUS ONCE
Refills: 0 | Status: DISCONTINUED | OUTPATIENT
Start: 2025-07-14 | End: 2025-07-15

## 2025-07-14 RX ORDER — DEXTROSE 50 % IN WATER 50 %
15 SYRINGE (ML) INTRAVENOUS ONCE
Refills: 0 | Status: DISCONTINUED | OUTPATIENT
Start: 2025-07-14 | End: 2025-07-15

## 2025-07-14 RX ORDER — INSULIN LISPRO 100 U/ML
INJECTION, SOLUTION INTRAVENOUS; SUBCUTANEOUS AT BEDTIME
Refills: 0 | Status: DISCONTINUED | OUTPATIENT
Start: 2025-07-14 | End: 2025-07-15

## 2025-07-14 RX ORDER — SENNA 187 MG
2 TABLET ORAL AT BEDTIME
Refills: 0 | Status: DISCONTINUED | OUTPATIENT
Start: 2025-07-14 | End: 2025-07-15

## 2025-07-14 RX ORDER — ONDANSETRON HCL/PF 4 MG/2 ML
4 VIAL (ML) INJECTION EVERY 6 HOURS
Refills: 0 | Status: DISCONTINUED | OUTPATIENT
Start: 2025-07-14 | End: 2025-07-15

## 2025-07-14 RX ORDER — TRAMADOL HYDROCHLORIDE 50 MG/1
50 TABLET, FILM COATED ORAL EVERY 6 HOURS
Refills: 0 | Status: DISCONTINUED | OUTPATIENT
Start: 2025-07-14 | End: 2025-07-15

## 2025-07-14 RX ORDER — ACETAMINOPHEN 500 MG/5ML
1000 LIQUID (ML) ORAL ONCE
Refills: 0 | Status: COMPLETED | OUTPATIENT
Start: 2025-07-15 | End: 2025-07-15

## 2025-07-14 RX ORDER — OXYCODONE HYDROCHLORIDE 30 MG/1
5 TABLET ORAL EVERY 4 HOURS
Refills: 0 | Status: DISCONTINUED | OUTPATIENT
Start: 2025-07-14 | End: 2025-07-15

## 2025-07-14 RX ORDER — HYDROMORPHONE/SOD CHLOR,ISO/PF 2 MG/10 ML
0.5 SYRINGE (ML) INJECTION
Refills: 0 | Status: DISCONTINUED | OUTPATIENT
Start: 2025-07-14 | End: 2025-07-14

## 2025-07-14 RX ADMIN — TRAMADOL HYDROCHLORIDE 50 MILLIGRAM(S): 50 TABLET, FILM COATED ORAL at 11:11

## 2025-07-14 RX ADMIN — Medication 400 MILLIGRAM(S): at 21:09

## 2025-07-14 RX ADMIN — TAMSULOSIN HYDROCHLORIDE 0.4 MILLIGRAM(S): 0.4 CAPSULE ORAL at 21:09

## 2025-07-14 RX ADMIN — Medication 40 MILLIGRAM(S): at 11:11

## 2025-07-14 RX ADMIN — INSULIN LISPRO 1: 100 INJECTION, SOLUTION INTRAVENOUS; SUBCUTANEOUS at 17:24

## 2025-07-14 RX ADMIN — ATORVASTATIN CALCIUM 20 MILLIGRAM(S): 80 TABLET, FILM COATED ORAL at 21:10

## 2025-07-14 RX ADMIN — Medication 81 MILLIGRAM(S): at 17:24

## 2025-07-14 RX ADMIN — Medication 1 APPLICATION(S): at 11:02

## 2025-07-14 RX ADMIN — INSULIN LISPRO 1: 100 INJECTION, SOLUTION INTRAVENOUS; SUBCUTANEOUS at 22:18

## 2025-07-14 RX ADMIN — Medication 500 MILLILITER(S): at 14:37

## 2025-07-14 RX ADMIN — Medication 500 MILLILITER(S): at 16:52

## 2025-07-14 RX ADMIN — Medication 40 MILLIGRAM(S): at 18:35

## 2025-07-14 RX ADMIN — KETOROLAC TROMETHAMINE 15 MILLIGRAM(S): 30 INJECTION, SOLUTION INTRAMUSCULAR; INTRAVENOUS at 21:09

## 2025-07-14 RX ADMIN — Medication 100 MILLIGRAM(S): at 21:09

## 2025-07-14 NOTE — DISCHARGE NOTE PROVIDER - NSDCFUADDINST_GEN_ALL_CORE_FT
Please call to schedule your post-operative follow up appointment with Dr. Nava for 2 weeks after hospital discharge.   Keep dressing clean, dry, and intact. Have doctor remove bandage at your post-operative follow up appointment.   Shower: with assistance. Keep the dressing away from the stream of water. Pat the dressing dry when coming out of the shower. No tub baths.   Continue to ambulate as tolerated to the LEFT LEG with a rolling walker.   Continue to take Aspirin 81mg twice daily x 6 weeks to help prevent blood clots. Please continue taking Protonix 40mg daily while taking aspirin for gastrointestinal protection.   Recommended to follow up with your primary care provider within 1-2 months of hospital discharge to discuss your recent surgery and any change to your medications.    Please call to schedule your post-operative follow up appointment with Dr. Nava for 2 weeks after hospital discharge.  Remove outer Ace bandage and web roll on 7/16. Keep Mepilex bandage in place.  DO NOT shower until Ace bandage removed.  Keep dressing clean, dry, and intact. Have doctor remove bandage at your post-operative follow up appointment.   Shower: with assistance. Keep the dressing away from the stream of water. Pat the dressing dry when coming out of the shower. No tub baths.   Continue to ambulate as tolerated to the LEFT LEG with a rolling walker.   Continue to take Aspirin 81mg twice daily x 6 weeks to help prevent blood clots. Please continue taking Protonix 40mg daily while taking aspirin for gastrointestinal protection.   Recommended to follow up with your primary care provider within 1-2 months of hospital discharge to discuss your recent surgery and any change to your medications.

## 2025-07-14 NOTE — PRE-ANESTHESIA EVALUATION ADULT - BP NONINVASIVE DIASTOLIC (MM HG)
"ED Provider Note    CHIEF COMPLAINT  Chief Complaint   Patient presents with    Head Injury     Pt. Does not recall what caused her to fall. Reports hitting head on \"the kitchen counter\" and presents with large lac and hematoma to L forehead. Bleeding controlled. Visitor contributes that pt. Is repeating herself and seems confused. Pt. Denies blood thinners. GCS is 15.        EXTERNAL RECORDS REVIEWED  Outpatient Notes patient has a history of of GERD migraines prior PE in 2001 aspirin intolerance on every other day aspirin    HPI/ROS  LIMITATION TO HISTORY   Select: : None  OUTSIDE HISTORIAN(S):  Family patient's daughter states she has been a little repetitive since she picked her up.    Indy Patel is a 84 y.o. female who presents to the emergency department with chief complaint of a headache.  She states she is not sure what caused her to fall she states was in the kitchen and the next thing she knows she felt and ow and then was on the ground.  She believes she hit her head on the kitchen counter.  She denies any chest pain or shortness of breath prior to the incident.  She thinks that she may have passed out but she is a little repetitive.  She is alert and oriented.    Patient takes an every other day aspirin.  She denies any neck pain.  Denies weakness numbness or tingling    PAST MEDICAL HISTORY   has a past medical history of CATARACT, Colonic polyps, GERD (gastroesophageal reflux disease), Glucose intolerance, Heart burn, Heart murmur, History of fundoplication (2001), Indigestion, IPMN (intraductal papillary mucinous neoplasm) (2007), IPMN (intraductal papillary mucinous neoplasm) (8/18/2015), Migraine, Osteopenia, Other specified disorder of intestines, Pancreatitis chronic, Personal history of venous thrombosis and embolism (2001, 1991), Reactive hypoglycemia, S/P appendectomy (1991), S/P cholecystectomy (2004), S/P hysterectomy with oophorectomy (1990), and Superficial phlebitis of " 76 leg.    SURGICAL HISTORY   has a past surgical history that includes gastroscopy with biopsy (4/25/08); egd w/endoscopic ultrasound (4/25/08); other orthopedic surgery; other abdominal surgery; gyn surgery; appendectomy (1991); egd w/endoscopic ultrasound (10/21/2009); gastroscopy with biopsy (10/21/2009); gastroscopy with biopsy (11/18/2010); egd w/endoscopic ultrasound (11/18/2010); cataract extraction with iol (11/2010); nissen fundoplication laparoscopic (2002); nissen fundoplication laparoscopic (2003); ercp w/sphincterotomy/papill. (6/7/2011); gastroscopy with biopsy (1/23/2012); and egd w/endoscopic ultrasound (1/23/2012).    FAMILY HISTORY  Family History   Problem Relation Age of Onset    Glasses Mother     Cancer Father         lung, smoker    Glasses Father     Heart Disease Father     Diabetes Paternal Aunt     Cancer Paternal Grandmother         breast cancer    Diabetes Paternal Grandfather     Diabetes Other     Heart Disease Other     Hypertension Other     Cancer Other        SOCIAL HISTORY  Social History     Tobacco Use    Smoking status: Never    Smokeless tobacco: Never   Vaping Use    Vaping Use: Not on file   Substance and Sexual Activity    Alcohol use: No     Comment: once a month    Drug use: No    Sexual activity: Not on file       CURRENT MEDICATIONS  Home Medications       Reviewed by Bárbara Medina R.N. (Registered Nurse) on 03/10/24 at 1857  Med List Status: Not Addressed     Medication Last Dose Status   Bimatoprost 0.03 % Solution  Active   Calcium Carbonate-Vitamin D (CALCIUM-VITAMIN D) 600-125 MG-UNIT Tab  Active   cholestyramine (QUESTRAN) 4 GM/DOSE powder  Active   Continuous Blood Gluc  (FREESTYLE AYUSH 2 READER) Device  Active   Continuous Blood Gluc Sensor (FREESTYLE AYUSH 2 SENSOR) Misc  Active   Cyanocobalamin (B-12) 1000 MCG Tab  Active   Hyoscyamine Sulfate SL 0.125 MG SL Tab  Active   LORazepam (ATIVAN) 0.5 MG Tab  Active   meclizine (ANTIVERT) 12.5 MG Tab   "Active   mesalamine (LIALDA) 1.2 GM Tablet Delayed Response  Active   Probiotic Product (PROBIOTIC DAILY PO)  Active   traMADol (ULTRAM) 50 MG Tab  Active   traZODone (DESYREL) 50 MG Tab  Active   verapamil SR (CALAN SR) 120 MG CR tablet  Active   Vitamin E 180 MG Cap  Active                    ALLERGIES  Extensive reviewed      PHYSICAL EXAM  VITAL SIGNS: BP (!) 160/104   Pulse 81   Temp 36.7 °C (98 °F) (Temporal)   Resp 20   Ht 1.575 m (5' 2\")   Wt 50.9 kg (112 lb 3.4 oz)   SpO2 96%   BMI 20.52 kg/m²    Pulse OX: Pulse Oxygen level is normal  Constitutional: Alert in no apparent distress.  HENT: Normocephalic, AL frontal forehead mild hematoma with laceration 5 cm without active bleeding, MMM  Eyes: PERound. Conjunctiva normal, non-icteric.   Heart: Regular rate and rhythm, intact distal pulses   Lungs: Symmetrical movement, no resp distress   Abdomen: Non-tender, non-distended, normal bowel sounds  EXT/Back DP pulses 2+  Skin: Warm, Dry, No erythema, No rash.   Neurologic: Alert and oriented, cranial nerves II through XII intact she is alert and oriented x 3 no slurred speech a little repetitive with questioning strength 5 out of 5 throughout but the left lower extremity maybe a 4 out of 5 little bit of drift or at least not as strong as the right lower extremity      DIAGNOSTIC STUDIES / PROCEDURES  EKG  I have independently interpreted this EKG  Results for orders placed or performed during the hospital encounter of 03/10/24   EKG   Result Value Ref Range    Report       [facility] Emergency Dept.    Test Date:  2024-03-10  Pt Name:    EVER ROYAL               Department: [facility]  MRN:        [id number]                      Room:  Gender:     Female                       Technician: IVANIA  :        [id number]                   Requested By:ER TRIAGE PROTOCOL  Order #:    [id number]                    Reading MD: Bria Moise MD    Measurements  Intervals                          "       Axis  Rate:       74                           P:          58  NJ:         152                          QRS:        -44  QRSD:       98                           T:          49  QT:         414  QTc:        460    Interpretive Statements  Sinus rhythm at a rate of 74 with a PAC no ST elevations or ST depressions no  abnormal T wave inversions normal intervals normal axis  Compared to ECG 11/22/2021 15:03:48  no sig change with PAC  Electronically Signed On 03- 19:51:21 PDT by Bria Moise MD           LABS  Labs Reviewed   CBC WITH DIFFERENTIAL - Abnormal; Notable for the following components:       Result Value    Neutrophils-Polys 73.90 (*)     Lymphocytes 9.10 (*)     Eosinophils 11.20 (*)     Lymphs (Absolute) 0.78 (*)     Eos (Absolute) 0.96 (*)     All other components within normal limits    Narrative:     Indicate which anticoagulants the patient is on:->UNKNOWN  Do you want to extend TEG graph to LY30? (If no, graph will  terminate at MA)->Yes   BASIC METABOLIC PANEL    Narrative:     Indicate which anticoagulants the patient is on:->UNKNOWN  Do you want to extend TEG graph to LY30? (If no, graph will  terminate at MA)->Yes   PROTHROMBIN TIME    Narrative:     Indicate which anticoagulants the patient is on:->UNKNOWN  Do you want to extend TEG graph to LY30? (If no, graph will  terminate at MA)->Yes   COD (ADULT)   APTT    Narrative:     Indicate which anticoagulants the patient is on:->UNKNOWN  Do you want to extend TEG graph to LY30? (If no, graph will  terminate at MA)->Yes   PLATELET MAPPING WITH BASIC TEG    Narrative:     Indicate which anticoagulants the patient is on:->UNKNOWN  Do you want to extend TEG graph to LY30? (If no, graph will  terminate at MA)->Yes         RADIOLOGY  I have independently interpreted the diagnostic imaging associated with this visit and am waiting the final reading from the radiologist.   My preliminary interpretation is as follows:     Right-sided subdural  hematoma measured by myself at 13 mm      Radiologist interpretation:     CT-HEAD W/O   Final Result      1.  13 mm acute right cerebral hemispheric subdural hematoma with mass effect and 3 mm leftward midline shift.      Efforts are underway to contact referring physician with results at time of dictation.            COURSE & MEDICAL DECISION MAKING    ED Observation Status? No; Patient does not meet criteria for ED Observation.     INITIAL ASSESSMENT, COURSE AND PLAN  Care Narrative:     Patient is an 84-year-old female who presents with a possible syncopal event leading to a fall and head injury.  Head CT was ordered by myself in the triage due to concern to a TBI.  She was taken directly to CT and unfortunately there is signs of a subdural hematoma.  Bedside evaluation she is alert and oriented but a little bit repetitive with her questions.  Questionable mild left-sided weakness just on my exam in terms of ability to hold her leg up off the bed compared to the right but she endorses she feels similarly strong.    Tetanus will be updated the patient had laboratory analysis done including a teg was ordered and I emergently called for transport to the outside facility.  There is no active leading to her lack at this time and I believe emergent transport is more important than laceration repair.  If I have time I will do it if not should be transferred immediately.    DISPOSITION AND DISCUSSIONS    7:26 PM  I spoke with Dr. Rivera with [facility] for transport for acute SDH to the emergency department to be evaluated by himself trauma surgery and neurosurgery.    Family understands the plan for transport and are amenable at this time.      7:49 PM  Spoke w radiology who confirms acute SDH     CRITICAL CARE  The very real possibilty of a deterioration of this patient's condition required the highest level of my preparedness for sudden, emergent intervention.  I provided critical care services, which included  medication orders, frequent reevaluations of the patient's condition and response to treatment, ordering and reviewing test results, and discussing the case with various consultants.  The critical care time associated with the care of the patient was 37 minutes. Review chart for interventions. This time is exclusive of any other billable procedures.       I have discussed management of the patient with the following physicians and KENDRA's:  [place], Radiology    Discussion of management with other [facility] or appropriate source(s): Pharmacy for tdap        Decision tools and prescription drugs considered including, but not limited to: NEXUS criteriahead was not cleared .    FINAL DIAGNOSIS  1. Closed head injury, initial encounter    2. Subdural hematoma (HCC)       CCT 37 min     Electronically signed by: Bria Moise M.D., 3/10/2024 7:26 PM

## 2025-07-14 NOTE — CHART NOTE - NSCHARTNOTEFT_GEN_A_CORE
Patient was seen and evaluated at bedside. No chest pain, SOB, N/V/D/HA.     T(C): 36.2 (07-14-25 @ 15:30), Max: 36.8 (07-14-25 @ 09:38)  HR: 66 (07-14-25 @ 15:50) (58 - 85)  BP: 147/72 (07-14-25 @ 15:50) (102/55 - 162/73)  RR: 18 (07-14-25 @ 15:50) (15 - 19)  SpO2: 98% (07-14-25 @ 15:50) (96% - 100%)  Wt(kg): --    Exam:  Alert and Oriented, No Acute Distress  Cardiac: Normal S1 & S2, RRR, No murmurs, rubs or gallops appreciated.  Pulmonary: normal breathing effort, no retractions, no diminished lung sounds appreciated.  Bronchial/Vesicular lungs sounds appreciated throughout all lung lobes.  Lower Extremities: Left Lower Extremity   Dressing: ACE dressing over LLE with underlying bulky harding dressing C/D/I  Calves Soft, Non-tender bilaterally  Motor: unable to DF/PF/EHL/FHL at this time 2/2 spinal block still in effect   Sensory: decreased sensation bilaterally throughout 2/2 spinal block still in effect   Palpable 2+ DP Pulse  Warm and well-perfused, cap refill < 2 sec.     Xray: s/p L Total Knee Arthroplasty   < from: Xray Knee 3 Views, Left (07.14.25 @ 15:14) >    INTERPRETATION:  History: Postoperative.    AP and cross-table lateral views of the left knee were performed.    Findings:    The patient is status-post left total knee replacement with patellar   resurfacing and cemented components in the usual position. The hardware   is intact. There is no evidence of gera-prosthetic fracture.   Post-operative changes include skin staples, soft tissue swelling and   subcutaneous emphysema.    Impression:    Status-post left total knee arthroplasty.    --- End of Report ---    Labs:    A/p: 70yMale s/p L Total Knee Arthroplasty. VSS. NAD.  PT/OT- WBAT LLE   IS  DVT PPx: Aspirin 81 mg BID, SCD   GI PPx: Protonix 40 mg QD   Pain Control  Continue Current Tx.  FU AM labs   FU motor/sensory reassessment once spinal block no longer in effect   Dispo planning: recommended for TBD, pending PT/OT eval.     John Escobedo PA-C  Orthopedic Surgery   Team Pager: #[phone]

## 2025-07-14 NOTE — ASU PREOP CHECKLIST - NEEDLE GAUGE
Progress Note - [address] [facility]  46 y o  male MRN: [id number]  Unit/Bed#: MICU 05 Encounter: [id number]  Attending Physician: David Hodge MD  Assessment:   Principal Problem:    Refractory status epilepticus, NCSE s/p burst suppresion  Active Problems:    Acute respiratory failure     Sepsis ([facility] [address] )    Essential hypertension    Diabetes ([facility] [address] )    Seizure ([facility] [address] )  Recent SAH    TBI (traumatic brain injury) ([facility] [address] )    Paraphimosis  Urinary retention  Hypokalemia  Hypophosphatemia  Resolved Problems:    Head injury      Plan  Neuro:   subclinicall right hemispheric focal status epilepticus  Recent traumatic subarachnoid hemorrhage  Encephalopathy  · Continue AED: Keppra 1 gram BID, Dilantin 200 mg IV Q12, Valproic 500 mg Q8  · Levels from yesterday pending (analyzer malfunction yesterday)  · CvEEG per neurology  · PAD  · Pain controlled with: Fentanyl gtt 100 mcg/hr (7 doses of PRN/24 hours)  · Sedation plan/Daily sedation holiday: Precedex 1 mcg/kg/hr (6 doses of PRN Versed/24 hours)  · RASS goal: -1 Drowsy and 0 Alert and Calm  · Delirium Precautions  · CAM ICU per protocol  · Regulate sleep/wake cycle+  · Trend neuro exam  CV:   Sepsis, possible pulmonary source  · Hemodynamic infusions: None  · MAP goal > 65  · Rhythm: NSR  · Follow rhythm on telemetry  Lung:   Acute hypoxemic respiratory failure  · Daily SBT assessment in coordination with SAT per protocol: assess  ·  Chlorhexidine/HOB>30degrees ordered: yes  · Pulmonary toileting  · Wean FiO2/Peep as able  · Nicotine patch  · Repeat CXR in AM  GI:   · Stress ulcer prophylaxis: Not Indicated  · Bowel regimen: Miralax and Senna    FEN:   Hypokalemia  Hypophosphatemia  · Goal 24 hour fluid balance: euvolemia   Fluid/Diuretic plan: Trial dose of lasix 20 mg IV x 1   LOS 8 8 L positive  · Nutrition/diet plan: Continue TF  · Replete electrolytes with goals: K >4 0, Mag >2 0, and Phos >3 0  :   Urinary retention  · Indwelling Kurtz present: yes · Trend UOP and BUN/creat  · Strict I and O  ID:   Sepsis; possible aspiration versus urinary source  · Abx ordered: ampicillin D2, Ceftriaxone D2, Vancomycin D3  · F/u cultures  · PCT  · Trend temps and WBC count  Heme:   · Trend hgb and plts  · Transfuse as needed for goal hgb >7  Endo:   Diabetes  · Glycemic control plan: Lantus/SSI  · 30 units Lantus; SSI 50 units/24 hours  · -293 mg/dL  MSK/Skin:  · Mobility goal:   · PT consult: not applicable  · OT consult: not applicable  · Frequent turning and pressure off-loading  Family:  · Family updated within 24 hours: yes   · Family meeting planned today: no   Lines:  · Right PICC   VTE Prophylaxis:  · Pharmacologic Prophylaxis:Enoxaparin (Lovenox)  · Mechanical Prophylaxis: sequential compression device    Disposition: Continue ICU care      ______________________________________________________________________  Chief Complaint:   Patient intubated sedated, overnight events discussed with nursing staff bedside    HPI/24hr events:   Mucous plugging with hypoxia this AM, increased secretions  LP held  Increased PRN medication requirements  Increased Insulin requirements  Low-grade temp 100 8°  Antibiotics changed yesterday, high-dose ceftriaxone and ampicillin started    Review of Systems   Unable to perform ROS: Intubated     ______________________________________________________________________  Temperature:   Temp (24hrs), Av 8 °F (37 7 °C), Min:99 °F (37 2 °C), Max:100 8 °F (38 2 °C)    Current Temperature: 100 4 °F (38 °C)    Vitals:    19 0500 19 0506 19 0518 19 0600   BP:  121/77  103/68   BP Location:       Pulse: 82 86 82 80   Resp: 17 19 16 (!) 9   Temp: 100 4 °F (38 °C) 100 4 °F (38 °C) (!) 100 8 °F (38 2 °C) 100 4 °F (38 °C)   TempSrc:       SpO2: (!) 88% 95% 90% 92%   Weight:       Height:                  Weights:   IBW: 73 kg    Body mass index is 36 88 kg/m²    Weight (last 2 days)     Date/Time   Weight    19 0300   117 (257 06)            Height: 5' 10" (177 8 cm)  Hemodynamic Monitoring:  N/A     Noninvasive/Ventilator Settings:  Ventilator Settings:   Vent Mode: AC/VC    Settings  Resp Rate (BPM): 12 BPM  Vt (mL): 500 mL  FIO2 (%): 50 %  PEEP (cmH2O): 5 cmH2O  Flow (LPM): 60 L/min    SpO2: SpO2: 92 %  ______________________________________________________________________  Physical Exam:  Aguilar Agitation Sedation Scale (RASS): Very agitated  Physical Exam   Constitutional: He appears well-developed  He is cooperative  No distress  He is sedated, intubated and restrained  HENT:   Head: Normocephalic and atraumatic  Eyes: Pupils are equal, round, and reactive to light  Cardiovascular: Normal rate, regular rhythm and normal heart sounds  Pulses:       Radial pulses are 2+ on the right side, and 2+ on the left side  Dorsalis pedis pulses are 1+ on the right side, and 1+ on the left side  Pulmonary/Chest: No accessory muscle usage  He is intubated  No respiratory distress  He has rhonchi  Abdominal: Soft  Bowel sounds are normal  There is no tenderness  +ve BM yesterday   Genitourinary:   Genitourinary Comments: Kurtz to gravity   Neurological: He is alert  GCS eye subscore is 4  GCS verbal subscore is 1  GCS motor subscore is 6  Pt agitated at times  [facility], follows two step commands   Skin: Skin is warm and dry  Capillary refill takes less than 2 seconds       ______________________________________________________________________  Intake and Outputs:  I/O       09/20 0701 - 09/21 0700 09/21 0701 - 09/22 0700    I V  (mL/kg) 3844 7 (32 9) 1085 5 (9 3)    NG/GT 1599 1510    IV Piggyback 980 1130    Feedings  760    Total Intake(mL/kg) 6423 7 (54 9) 4485 5 (38 3)    Urine (mL/kg/hr) 1020 (0 4) 1410 (0 5)    Total Output 1020 1410    Net +5403 7 +3075 5              Nutrition:        Diet Orders   (From admission, onward)             Start     Ordered    09/20/19 0943  Diet Enteral/Parenteral; Tube Feeding No Oral Diet; Jevity 1 2 Yunior; Continuous; 80; 120; Every 4 hours  Diet effective now     Comments:  Increase by 10 mL/hr every 4 hours until at goal of 80 mL/hr   Question Answer Comment   Diet Type Enteral/Parenteral    Enteral/Parenteral Tube Feeding No Oral Diet    Tube Feeding Formula: Jevity 1 2 Yunior    Bolus/Cyclic/Continuous Continuous    Tube Feeding Goal Rate (mL/hr): 80    Tube Feeding water flush (mL): 120    Water flush frequency: Every 4 hours    RD to adjust diet per protocol?  Yes        09/20/19 0944                Labs:   Results from last 7 days   Lab Units 09/22/19  0410 09/21/19  0547 09/20/19  0451 09/19/19  1113 09/18/19  0518 09/17/19  2218   WBC Thousand/uL 6 77 6 44 8 49 8 64 7 28 6 35   HEMOGLOBIN g/dL 12 5 13 2 15 0 15 9 14 2 15 3   HEMATOCRIT % 40 5 42 3 45 9 48 6 42 6 46 2   PLATELETS Thousands/uL 128* 144* 186 224 180 180   NEUTROS PCT % 79* 81* 76* 72 66 74   MONOS PCT % 10 9 7 7 7 6     Results from last 7 days   Lab Units 09/22/19  0410 09/21/19  0547 09/20/19  1214 09/20/19  0451 09/19/19  1116 09/18/19  0518 09/17/19  2218   SODIUM mmol/L 142 140  --  140 136 135* 130*   POTASSIUM mmol/L 3 5 3 7 3 3* 3 0* 3 3* 3 3* 4 1   CHLORIDE mmol/L 111* 110*  --  107 103 101 95*   CO2 mmol/L 27 25  --  27 26 23 28   ANION GAP mmol/L 4 5  --  6 7 11 7   BUN mg/dL 13 15  --  12 11 9 9   CREATININE mg/dL 0 66 0 88  --  0 83 1 18 0 78 1 13   CALCIUM mg/dL 7 9* 7 8*  --  8 4 9 3 8 8 9 3   GLUCOSE RANDOM mg/dL 196* 213*  --  211* 342* 322* 616*   ALT U/L  --   --   --   --  53 48 61   AST U/L  --   --   --   --  36 33 46*   ALK PHOS U/L  --   --   --   --  99 90 115   ALBUMIN g/dL  --  2 4*  --  2 8* 3 3* 3 1* 3 4*   TOTAL BILIRUBIN mg/dL  --   --   --   --  1 12* 1 00 1 10*     Results from last 7 days   Lab Units 09/22/19  0410 09/21/19  0547 09/20/19  1214 09/19/19  1117 09/18/19  0518   MAGNESIUM mg/dL 2 3 2 7* 2 7* 2 2 1 7   PHOSPHORUS mg/dL 2 2*  --   --  2 4* 2 9      Results from last 7 days   Lab Units 09/21/19  0129   INR  1 22*   PTT seconds 24          Results from last 7 days   Lab Units 09/22/19  0410 09/21/19  0016   PROCALCITONIN ng/ml 0 35* 0 14     Imaging: I have personally reviewed pertinent reports  Poor inspiratory effort, underpenetrated, rotated, no obvious infiltrate ET tube in appropriate position, slight blunting of left costophrenic angle  Micro:       Procedure Component Value - Date/Time   MRSA culture [[id number]] Collected: 09/21/19 1815   Lab Status: In process Specimen: Nares from Nose Updated: 09/21/19 1828   Urine culture [[id number]] Collected: 09/21/19 0936   Lab Status: In process Specimen: Urine Updated: 09/21/19 0936   Blood culture [[id number]] Collected: 09/21/19 0140   Lab Status: In process Specimen: Blood from Arm, Left Updated: 09/21/19 0144   Blood culture [[id number]] Collected: 09/21/19 0125   Lab Status:  In process Specimen: Blood from Hand, Right Updated: 09/21/19 0144       Allergies: No Known Allergies  Medications:   Scheduled Meds:  Current Facility-Administered Medications:  acetaminophen 650 mg Per NG Tube Q6H PRN Octavio Collins PA-C    alteplase 2 mg Intracatheter Once Shelva Margarito, CRNP    alteplase 2 mg Intracatheter Once Shelva Margarito, CRNP    ampicillin 2,000 mg Intravenous Q4H Leah Bess MD Last Rate: Stopped (09/22/19 0547)   bisacodyl 10 mg Rectal Daily PRN Umang Wang DO    cefTRIAXone 2,000 mg Intravenous Q12H Leah Bess MD Last Rate: 2,000 mg (09/22/19 0606)   chlorhexidine 15 mL Swish & Spit Q12H [facility] ROCKY Paulson    dexmedetomidine 0 1-1 mcg/kg/hr Intravenous Titrated Leah Bess MD Last Rate: 1 mcg/kg/hr (09/22/19 0318)   enoxaparin 40 mg Subcutaneous Daily ROCKY Paulson    fentaNYL 100 mcg/hr Intravenous Continuous Shelva Margarito, CRNP Last Rate: 100 mcg/hr (09/22/19 0541)   fentanyl citrate (PF) 100 mcg Intravenous Q2H PRN See Figueroa MD    insulin lispro 5-25 Units Subcutaneous Q6H [facility] Kaylie Allen YOVANI Vicente    levalbuterol 1 25 mg Nebulization Q8H Geovanna Dunlap,     levETIRAcetam 1,000 mg Intravenous Q12H ROCKY Benitez Last Rate: Stopped (09/21/19 2122)   metroNIDAZOLE 500 mg Intravenous Q8H Christine Mckinney MD Last Rate: 500 mg (09/22/19 0013)   midazolam 2 mg Intravenous Q2H PRN Rolene Codding, CRNP    nicotine 1 patch Transdermal Daily Rolene Codding, ROCKY    ondansetron 4 mg Intravenous Q6H PRN Rolene Codding, CRNP    phenytoin 200 mg Intravenous Q12H [facility] Rolene CoddingROCKY Last Rate: Stopped (09/21/19 2101)   polyethylene glycol 17 g Per NG Tube Daily Dustin Croon, ROCKY    senna 17 6 mg Oral HS Dustin Croon, CRNIMA    sodium chloride 3 mL Nebulization Q8H Yazan Mark,     valproic acid 500 mg Oral [facility] ROCKY Myrick    vancomycin 15 mg/kg Intravenous Q12H Christine Mckinney MD Last Rate: Stopped (09/22/19 1499)     Continuous Infusions:  dexmedetomidine 0 1-1 mcg/kg/hr Last Rate: 1 mcg/kg/hr (09/22/19 0318)   fentaNYL 100 mcg/hr Last Rate: 100 mcg/hr (09/22/19 0541)     PRN Meds:    acetaminophen 650 mg Q6H PRN   bisacodyl 10 mg Daily PRN   fentanyl citrate (PF) 100 mcg Q2H PRN   midazolam 2 mg Q2H PRN   ondansetron 4 mg Q6H PRN       Invasive lines and devices: Invasive Devices     Peripherally Inserted Central Catheter Line            PICC Line 09/19/19 Right Brachial 2 days          Drain            NG/OG/Enteral Tube Orogastric Center mouth 1 day    Urethral Catheter Temperature probe 16 Fr  1 day          Airway            ETT  8 mm 2 days                   Counseling / Coordination of Care  0 mins of critical care time    Code Status: Level 1 - Full Code    Portions of the record may have been created with voice recognition software  Occasional wrong word or "sound a like" substitutions may have occurred due to the inherent limitations of voice recognition software    Read the chart carefully and recognize, using context, where substitutions have occurred      ROCKY Pinedo 20

## 2025-07-14 NOTE — PHYSICAL THERAPY INITIAL EVALUATION ADULT - ACTIVE RANGE OF MOTION EXAMINATION, REHAB EVAL
L knee 0 to 90 degrees/bilateral upper extremity Active ROM was WFL (within functional limits)/bilateral  lower extremity Active ROM was WFL (within functional limits)

## 2025-07-14 NOTE — ASU PREOP CHECKLIST - BOWEL PREP
Detail Level: Simple Instructions (Optional): Will get authorization for x1 punch biopsy Procedure To Be Performed At Next Visit: Biopsy by punch method Introduction Text (Please End With A Colon): * n/a

## 2025-07-14 NOTE — PRE-ANESTHESIA EVALUATION ADULT - SPO2 (%)
Pt presents to tx room via EMS w/ c/o right ankle pain after a mechanical fall. Per EMS pt was stepping down from a ladder and twisted her right ankle. Ankle is swollen, but pt is able to move in all directions. No obvious deformity noted. Pt has sensation intact. Pt denies hitting head. Pt is a/o x4 and able to express needs using complete sentences.
96

## 2025-07-14 NOTE — PHYSICAL THERAPY INITIAL EVALUATION ADULT - GENERAL OBSERVATIONS, REHAB EVAL
Rec'd Rec'd semi supine in bed in NAD, VSS, A/Ox4, +IV, LLE ace wrap, wife at bedside, agreeable to PT.

## 2025-07-14 NOTE — DISCHARGE NOTE PROVIDER - HOSPITAL COURSE
HPI:   70 year old male presents to PST for scheduled left total knee arthroplasty with paulo robot byt Dr. Shivam Nava on 7/14/25. PMH DM, HTN, HLD, BPH, acute ischemic colitis, s/p partial colectomy with colostomy 9/2017 with reversal 1/2018, kidney stones s/p lithotripsy x2. Denies N/V, SOB, KELLY, chest pain or palpitations.   Goals of Care: To be able to walk more and exercise.  PMHx/PSHx:   PAST MEDICAL & SURGICAL HISTORY:  Diabetes mellitus type 2, noninsulin dependent  Ischemic colitis  HTN (hypertension)  HLD (hyperlipidemia)  BPH (benign prostatic hyperplasia)  S/P partial colectomy  H/O lithotripsy    Hospital Course:   After admission on 7/14 and receiving pre-operative parenteral prophylactic antibiotics, the patient underwent an uncomplicated Left total knee replacement with Dr. Nava. Patient tolerated the procedure well and was transferred to the recovery room in stable condition, with a stable neuro/vascular exam of the operated extremity.    Patient was placed on Aspirin for DVT ppx, and was placed on Protonix 40 mg for GI protection.   Patient was made weight bearing as tolerated with the operative leg.     Typical Physical & occupational therapy modalities post surgery were performed by physical and occupational therapies, including ambulation training, range of motion, ADL's, abd transfers.  After progression of mobility guided by the PT/ OT staff,  the patient was felt to benefit from further rehabilitative care for restoration to level of function. This was felt to best be accomplished at home with home PT.  Discharge and Orthopedic Care instructions were delineated in the Discharge Plan and reviewed with the patient. All medications were delineated in the medication reconciliation tool and key points were reviewed with the patient. They were deemed stable from an Orthopedic & medical standpoint for discharge on XXXXXX.    HPI:   70 year old male presents to PST for scheduled left total knee arthroplasty with paulo robot byt Dr. Shivam Nava on 7/14/25. PMH DM, HTN, HLD, BPH, acute ischemic colitis, s/p partial colectomy with colostomy 9/2017 with reversal 1/2018, kidney stones s/p lithotripsy x2. Denies N/V, SOB, KELLY, chest pain or palpitations.   Goals of Care: To be able to walk more and exercise.  PMHx/PSHx:   PAST MEDICAL & SURGICAL HISTORY:  Diabetes mellitus type 2, noninsulin dependent  Ischemic colitis  HTN (hypertension)  HLD (hyperlipidemia)  BPH (benign prostatic hyperplasia)  S/P partial colectomy  H/O lithotripsy    Hospital Course:   After admission on 7/14 and receiving pre-operative parenteral prophylactic antibiotics, the patient underwent an uncomplicated Left total knee replacement with Dr. Nava. Patient tolerated the procedure well and was transferred to the recovery room in stable condition, with a stable neuro/vascular exam of the operated extremity.    Patient was placed on Aspirin for DVT ppx, and was placed on Protonix 40 mg for GI protection.   Patient was made weight bearing as tolerated with the operative leg.     Typical Physical & occupational therapy modalities post surgery were performed by physical and occupational therapies, including ambulation training, range of motion, ADL's, abd transfers.  After progression of mobility guided by the PT/ OT staff,  the patient was felt to benefit from further rehabilitative care for restoration to level of function. This was felt to best be accomplished at home with home PT.  Discharge and Orthopedic Care instructions were delineated in the Discharge Plan and reviewed with the patient. All medications were delineated in the medication reconciliation tool and key points were reviewed with the patient. They were deemed stable from an Orthopedic & medical standpoint for discharge.

## 2025-07-14 NOTE — DISCHARGE NOTE PROVIDER - NSDCMRMEDTOKEN_GEN_ALL_CORE_FT
atorvastatin 20 mg oral tablet: 1 tab(s) orally once a day (at bedtime)  glipiZIDE 10 mg oral tablet: 1 tab(s) orally 2 times a day  pioglitazone 30 mg oral tablet: 1 tab(s) orally once a day  tamsulosin 0.4 mg oral capsule: 1 cap(s) orally 2 times a day  valsartan 40 mg oral tablet: 1 tab(s) orally once a day   acetaminophen 325 mg oral tablet: 3 tab(s) orally every 8 hours x 1 week then as needed for mild pain  aspirin 81 mg oral delayed release tablet: 1 tab(s) orally 2 times a day  atorvastatin 20 mg oral tablet: 1 tab(s) orally once a day (at bedtime)  glipiZIDE 10 mg oral tablet: 1 tab(s) orally 2 times a day  naproxen 500 mg oral tablet: 1 tab(s) orally every 12 hours  Narcan 4 mg/0.1 mL nasal spray: 1 spray(s) intranasally every 3 minutes as needed for opioid overdose alternating nostrils  oxyCODONE 5 mg oral tablet: 1 tab(s) orally every 4 hours as needed for  severe pain MDD: 006  pantoprazole 40 mg oral delayed release tablet: 1 tab(s) orally once a day (before a meal)  pioglitazone 30 mg oral tablet: 1 tab(s) orally once a day  polyethylene glycol 3350 oral powder for reconstitution: 17 gram(s) orally once a day (at bedtime)  senna leaf extract oral tablet: 2 tab(s) orally once a day (at bedtime) as needed for  constipation  tamsulosin 0.4 mg oral capsule: 1 cap(s) orally 2 times a day  traMADol 50 mg oral tablet: 1 tab(s) orally every 6 hours as needed for  moderate pain MDD: 004  valsartan 40 mg oral tablet: 1 tab(s) orally once a day

## 2025-07-14 NOTE — PHYSICAL THERAPY INITIAL EVALUATION ADULT - PERTINENT HX OF CURRENT PROBLEM, REHAB EVAL
70 year old male presents s/p scheduled left total knee arthroplasty with paulo robot byt Dr. Shivam Nava on 7/14/25. PMH DM, HTN, HLD, BPH, acute ischemic colitis, s/p partial colectomy with colostomy 9/2017 with reversal 1/2018, kidney stones s/p lithotripsy x2. Denies N/V, SOB, KELLY, chest pain or palpitations.

## 2025-07-14 NOTE — DISCHARGE NOTE PROVIDER - NSDCFUSCHEDAPPT_GEN_ALL_CORE_FT
Shivam Nava  [facility]  ORTHOSURG [address] [facility]  Scheduled Appointment: 07/28/2025

## 2025-07-14 NOTE — BRIEF OPERATIVE NOTE - ANTIBIOTIC PROTOCOL
Left supraclavicular    Patient location during procedure: pre-op   Block not for primary anesthetic.  Reason for block: at surgeon's request and post-op pain management   Post-op Pain Location: left elbow and wrist pain   Start time: 12/7/2022 12:45 PM  Timeout: 12/7/2022 12:45 PM   End time: 12/7/2022 12:50 PM    Staffing  Authorizing Provider: Abhishek Mesa MD  Performing Provider: Abhishek Mesa MD    Preanesthetic Checklist  Completed: patient identified, IV checked, site marked, risks and benefits discussed, surgical consent, monitors and equipment checked, pre-op evaluation and timeout performed  Peripheral Block  Patient position: supine  Prep: ChloraPrep  Patient monitoring: heart rate, cardiac monitor, continuous pulse ox, continuous capnometry and frequent blood pressure checks  Block type: supraclavicular  Laterality: left  Injection technique: single shot  Needle  Needle type: Stimuplex   Needle gauge: 22 G  Needle length: 2 in  Needle localization: anatomical landmarks and ultrasound guidance  Needle insertion depth: 5 cm   -ultrasound image captured on disc.  Assessment  Injection assessment: negative aspiration, negative parasthesia and local visualized surrounding nerve  Paresthesia pain: none  Heart rate change: no  Slow fractionated injection: yes  Pain Tolerance: comfortable throughout block and no complaints  Medications:    Medications: bupivacaine (pf) (MARCAINE) injection 0.5% - Perineural   10 mL - 12/7/2022 12:45:00 PM    Additional Notes  VSS.  [facility] RN monitoring vitals throughout procedure.  Patient tolerated procedure well.     Exparel 10ml + Marcaine 10ml 0.5% dosed under direct Ultrasound guidance         Followed protocol

## 2025-07-14 NOTE — PHYSICAL THERAPY INITIAL EVALUATION ADULT - ADDITIONAL COMMENTS
Pt lives in a private house with his wife with 4 PERRY +HRs, 1 flight inside to bedroom. Pt states he was independent with functional mobility without an AD, wife assisted with ADLs as needed. Pt owns a rolling walker.

## 2025-07-14 NOTE — PATIENT PROFILE ADULT - HAVE YOU RECENTLY LOST WEIGHT WITHOUT TRYING?
Progress Note - PMR   Name: Enrico Chavira 89 y.o. male I MRN: [id number]  Unit/Bed#: -01 I Date of Admission: 4/19/2025   Date of Service: 4/22/2025 I Hospital Day: 3     Assessment & Plan  Cellulitis of right lower extremity  HPI:   Patient with a history of venous stasis with ulceration, recently discharged from wound care (2/24/25)   Patient presented to the ED on 4/14 due to concern for RLE infection and worsening weakness   Met sepsis criteria with tachycardia, leukocytosis, and RLE cellulitis   2 g cefriaxone given in ED   CT RLE did not reveal soft tissue gas or fluid collections, but showed subcutaneous edema and skin thickening from the proximal thigh through the ankle consistent with cellulitis  Started on IV cefazolin on 4/15 ---> transitioned to 10 day course of cefadroxil on 4/19   Procalcitonin 15.61-->10.82-->4.70-->2.23-->1.32-->0.71  Blood cultures x 2 no growth after 72 hours  Lower extremity Doppler negative for DVT    Plan:   Continue cefadroxil 1 g q12 hours through 4/24  PT and OT for 3 hours a day, 5-6 days a week   Seriel skin checks and monitoring   Consult wound care RN (evaluated on 4/22 pending documentation)   Encourage leg elevation  Continue local wound care   Hypertension  Management at discretion of IM   Continue furosemide and metoprolol   Asthma-COPD overlap syndrome (HCC)  Managed at discretion of IM   Continue Symbicort and PRN albuterol   Chest x-ray 4/17: no acute pulmonary pathology  Factor V Leiden (HCC)  Diagnosed after unprovoked PE in 2018   Continue chronic warfarin (INR recently supratherapeutic in acute care)   Daily INRs while inpatient   Hyponatremia  Most recently 130 on 4/21 (126 in ED)   Nephrology managed in acute care   Na 124 on 11/11/24; suspect chronicity  1800 mL FR daily   Continue to monitor BMP   Atrial fibrillation (HCC)  Follows with cardiologist Dr. Mtz ([facility]), no documented history of a-fib. Patient and daughter also deny a history  "of dysthymia.   Continue metoprolol 25 mg q12h and chronic warfarin   Continue to monitor vitals and hemodynamic status     SONYA (acute kidney injury) (HCC)  Baseline creatinine appears to be around 0.8-0.9. Creatinine on admission 1.62, likely prerenal secondary to sepsis   4/15 ultrasound negative for hydronephrosis   Treated with IVF   Most recent creatinine (4/21) 0.79   Continue to monitor BMP  Fall  Patient reports only 1 fall in the past year. This fall occurred a few days before his admission. He describes trying to climb into bed, bt being too weak to lift his legs and subsequently falling. Daughter is not aware of any other falls beside this one.   PT and OT and fall precautions     Abnormal CT of the chest  4/18/25 CT: \"3.7 cm anterior right upper lobe solid mass with septated cystic component and calcification. 2.6 cm lateral right upper lobe opacity and 7 mm right lower lobe nodule. These may be infectious/inflammatory but malignancy cannot be excluded. 2.3 x 1.3 cm low-attenuation nodule in the mediastinum adjacent to the hiatal hernia, question benign or malignant lymph node.\"  Per daughter, patient was aware of nodules but RUL mass new. Patient does not want to follow-up on either.   Monitor vitals, oxygen, and potential malignancy related complications   Impaired mobility and ADLs  Patient was evaluated by the rehabilitation team MD and an appropriate candidate for acute inpatient rehabilitation program at this time.  The patient will tolerate 3 hours/day 5 to 7 days/week of intensive physical and  occupational therapy   in order to obtain goals for community discharge  Due to the patient's functional Compared to their baseline level of function in addition to their ongoing medical needs, the patient would benefit from daily supervision from a rehabilitation physician as well as rehabilitation nursing to implement and adjust the medical as well as functional plan of care in order to meet the " patient's goals.  Bladder: Monitor closely for urinary incontinence and/or retention. Monitor urine output and encourage spontaneous voids. If unable to void spontaneously, will monitor with PVR bladder scans and initiate CIC regimen.  Skin: Monitor for breakdown, frequent turns, pressure offloading  Sleep: Encourage sleep hygiene (routine that promotes healthy circadian rhythm,avoid caffeine later in the day, quiet/dark room at night, reduce electronic before bedtime)  Patient lives alone, previously independent. He has 10 children, many of  whom will be able to assist after discharge.   Anticipated Discharge Date:  Home with family support Saturday, May 3rd, 2025   HHOT/ HHPT/ nursing and DME which is in place.       Chronic left-sided low back pain  Pain levels tolerable, patient relates this pain to soreness from laying   First documented by PCP in 2020  Continue to monitor   PRN tylenol and Lidoderm   Anemia  First noted on 4/15, may be dilutional   Most recent hgb 10.4, continue to monitor CBC  History of sepsis  Possible severe sepsis given Cr increased >0.5 mg/dL from baseline and other criteria met   - INR jumped significantly as well   - Risk for post-sepsis syndrome  - Abx for per ID  - Optimal management of each as listed   - Optimal nutrition, hydration, and medical management  - Monitor vitals closely with and without activity  - Fall precautions   - Recommend acute comprehensive interdisciplinary inpatient rehabilitation to include intensive skilled therapies (PT, OT) as outlined with oversight and management by rehabilitation physician as well as inpatient rehab level nursing, case management and weekly interdisciplinary team meetings.     Potential for cognitive impairment  - Recommend MOCA cog screen in rehab later in course when more stable   - Recommend eval of med mgmt and IADLs   - Monitor neuro-exam, wakefulness, mood, cognition, insight into deficits and safety awareness  - Recommend patient  "(+/- family/caregiver education if applicable) and training as well as compensatory strategies to optimize safety, function, and decrease risk of complications  - Ensure optimal assistance/supervision after d/c   - Optimize sleep-wake cycle  - Limit sedating medications when possible  - Monitor and ensure optimal management electrolytes, nutrition, and hydration  - Monitor for signs or symptoms of infection, medication intolerances, other systemic etiologies  - Fall precautions with frequent rounding; proactive toileting program, patient should not be unattended in bathroom  - If concerns for safety in spite of frequent rounding consider virtual or in person sitter   - OP follow-up PCP and if needed additional specialists (ie neuro/zonia/pmr)  -4/21: \"MOCA as ordered completed during session scoring 19/30 with delayed recall and language most impaired.\"  Degenerative lumbar spinal stenosis  CT L spine - Diffuse severe thoracolumbar degenerative change with apex left lumbar scoliosis. A posteriorly directed vertebral body osteophyte on the right at L1-L2 results in moderate central canal narrowing  - Monitor neuro exam, b/b function, and pain  - Reports chronic pain about stable   Bilateral arm weakness  Reportedly started about 1-2 years ago and has been progressive  - B/L SH 3-/5, R EF 5, EE 4, WE 3 FF 3, L EF 5- We 2 EE 2; +Cuevas on L; brisk knee reflexes, strength fairly intact BLE   - Risk of cervical stenosis with compression and multilevel radiculopathy; hx of very severe deg lumbar stenosis  - Risk of RTC tears or combination - negative Marbella Butler  - Impacts ADL and function; some reports of worsening balance  - Per Dr. Goodwin, \"Discussed potential dx's with pt/family and potential work-up - they would not want sx at this point no matter what the cause but would like to know potential cause for weakness which could help with prognosis and rehab planning; apparently has metal in eye and cannot have MRI; " "they want to try to avoid contrast with slight risk of kidney toxicity\"  4/21: I discussed obtaining CT scan with patient and his daughter Lora, they stated that they would like to forgo CT scan as patient does would not want intervention regardless of diagnosis. He will continue to work with PT and OT for strengthening    - Monitor neuro exam, balance, symptoms closely   Chest wall asymmetry  L superior periscapular chest palpable and observable nontender region apparently there for a few year  - CT Chest did not mention concerns  - Recommend follow-up mgmt by PCP unless additional work-up needed in ARC at discretion of IM    Subjective   Patient is a 89 year-old male, with a past medical history of lower extremity venous stasis with ulceration of his right lower extremity, hypertension, asthma-COPD, factor V leiden on warfarin, presenting to ARC secondary to debility to recent sepsis. He initially presented to the ED on 4/14 due to RLE cellulitis and worsening weakness. He was noted to be in rapid a-fib with RVR in the ED and met sepsis criteria with tachycardia and leukocytosis. Cardizem infusion started in the ED and he converted back to NSR. Labs were significant for leukocytosis, elevated procalcitonin, hyponatremia and SONYA. CT of the RLE did not reveal soft tissue gas or fluid collections, but showed subcutaneous edema and skin thickening from the proximal thigh through the ankle consistent with cellulitis. He was subsequently started on IV cefazolin. Blood cultures negative after 72 hours. Renal and ID recommendations were appreciated. Per ID, lower extremity skin dry and cracked, which is a likely port of entry. He clinically improved with stabilization of lab work. To complete a 10-day course of antibiotics, he was transitioned to a 6-day course of cefadroxil upon discharge. He was evaluated by PT and OT and deemed an appropriate candidate for ARC due to functional deficits.     Chief Complaint: increased " weakness    Interval: Seen and evaluated patient in room. Daughter Lora present. He does not have any acute concerns. Seen by wound care today. Last BM 4/22, continent of bowel and bladder.     Objective :  Temp:  [97.5 °F (36.4 °C)-97.8 °F (36.6 °C)] 97.8 °F (36.6 °C)  HR:  [] 82  BP: (163-190)/(77-88) 168/86  Resp:  [16-18] 18  SpO2:  [97 %] 97 %  O2 Device: None (Room air)    Functional Update:  Physical Therapy Occupational Therapy   Weight Bearing Status: Weight Bearing as Tolerated  Transfers: Moderate Assistance  Bed Mobility: Minimal Assistance  Amulation Distance (ft): 15 feet  Ambulation: Total Assistance (min A, RW, wheelchair follow)  Assistive Device for Ambulation: Roller Walker  Wheelchair Mobility Distance: 120 ft  Wheelchair Mobility: Incidental Touching  Number of Stairs: 2  Assistive Device for Stairs: Bilateral Hand Rails  Stair Assistance: Moderate Assistance  Ramp:  (NT at this time)  Discharge Recommendations: Home with:  DC Home with:: Family Support, Home Physical Therapy   Eating: Supervision  Grooming: Supervision  Bathing: Minimal Assistance, Moderate Assistance  Bathing: Minimal Assistance, Moderate Assistance  Upper Body Dressing: Minimal Assistance  Lower Body Dressing: Maximum Assistance  Toileting: Moderate Assistance  Tub/Shower Transfer:  (TBA)  Toilet Transfer: Minimal Assistance, Moderate Assistance  Cognition: Within Defined Limits  Orientation: Person, Place, Time, Situation           Physical Exam  Vitals and nursing note reviewed.   Constitutional:       General: He is not in acute distress.     Appearance: He is not ill-appearing, toxic-appearing or diaphoretic.   HENT:      Head: Normocephalic and atraumatic.      Nose: Nose normal. No congestion.      Mouth/Throat:      Mouth: Mucous membranes are moist.   Pulmonary:      Effort: Pulmonary effort is normal. No respiratory distress.   Abdominal:      General: There is no distension.   Musculoskeletal:      Right lower  leg: Edema present.   Skin:     General: Skin is warm and dry.   Neurological:      General: No focal deficit present.      Mental Status: He is alert.   Psychiatric:         Mood and Affect: Mood normal.         Behavior: Behavior normal.                         Scheduled Meds:  Current Facility-Administered Medications   Medication Dose Route Frequency Provider Last Rate    acetaminophen  650 mg Oral Q6H PRN Nicolasa L Dagnall, PA-C      albuterol  2.5 mg Nebulization Q6H PRN Nicolasa L Dagnall, PA-C      artificial tear   Both Eyes HS Nicolasa L Dagnall, PA-C      Artificial Tears  1 drop Both Eyes 4x Daily PRN Nicolasa L Dagnall, PA-C      budesonide-formoterol  2 puff Inhalation BID Nicolasa L Dagnall, PA-C      cefadroxil  1,000 mg Oral Q12H CLARK Nicolasa L Dagnall, PA-C      dorzolamide  1 drop Left Eye BID Nicolasa L Dagnall, PA-C      furosemide  20 mg Oral Daily Nicolasa L Dagnall, PA-C      guaiFENesin  600 mg Oral Q12H CLARK Nicolasa L Dagnall, PA-C      latanoprost  1 drop Both Eyes HS Nicolasa L Dagnall, PA-C      lidocaine  1 patch Topical Daily Nicolasa L Dagnall, PA-C      metoprolol tartrate  25 mg Oral Q12H CLARK Nicolasa L Dagnall, PA-C      montelukast  10 mg Oral Daily Nicolasa L Dagnall, PA-C      nystatin   Topical BID Liseth Maruscak, PA-C      pantoprazole  20 mg Oral Early Morning Nicolasa L Dagnall, PA-C      polyethylene glycol  17 g Oral Daily Liseth Maruscak, PA-C      prednisoLONE acetate  1 drop Left Eye BID Nicolasa L Dagnall, PA-C      warfarin  7.5 mg Oral Daily (warfarin) Nicolasa L Dagnall, PA-C      white petrolatum-mineral oil   Topical BID Ta Goodwin MD           Lab Results: I have reviewed the following results:  Results from last 7 days   Lab Units 04/20/25  0507 04/19/25  0457 04/18/25  0446   HEMOGLOBIN g/dL 10.4* 10.3* 10.2*   HEMATOCRIT % 32.1* 31.2* 30.6*   WBC Thousand/uL 8.29 11.10* 11.06*   PLATELETS Thousands/uL 452* 389 365     Results from last 7 days   Lab Units  04/21/25  0507 04/20/25  0507 04/19/25  0457 04/17/25  0504 04/16/25  0426   BUN mg/dL 30* 28* 24   < > 46*   SODIUM mmol/L 130* 129* 131*   < > 130*   POTASSIUM mmol/L 4.3 4.6 4.4   < > 4.1   CHLORIDE mmol/L 98 99 99   < > 103   CREATININE mg/dL 0.79 0.69 0.76   < > 1.07   AST U/L  --  34  --   --  34   ALT U/L  --  14  --   --  33    < > = values in this interval not displayed.       Results from last 7 days   Lab Units 04/22/25  0612 04/21/25  0507 04/20/25  0507   PROTIME seconds 18.8* 17.9* 17.8*   INR  1.53* 1.43* 1.42*      Liseth Owusu PA-C  Physical Medicine and Rehabilitation  [facility]     No (0)

## 2025-07-15 ENCOUNTER — TRANSCRIPTION ENCOUNTER (OUTPATIENT)
Age: 70
End: 2025-07-15

## 2025-07-15 VITALS
TEMPERATURE: 99 F | RESPIRATION RATE: 18 BRPM | DIASTOLIC BLOOD PRESSURE: 74 MMHG | HEART RATE: 97 BPM | OXYGEN SATURATION: 97 % | SYSTOLIC BLOOD PRESSURE: 152 MMHG

## 2025-07-15 LAB
ANION GAP SERPL CALC-SCNC: 15 MMOL/L — SIGNIFICANT CHANGE UP (ref 5–17)
BUN SERPL-MCNC: 27 MG/DL — HIGH (ref 7–23)
CALCIUM SERPL-MCNC: 9.3 MG/DL — SIGNIFICANT CHANGE UP (ref 8.4–10.5)
CHLORIDE SERPL-SCNC: 100 MMOL/L — SIGNIFICANT CHANGE UP (ref 96–108)
CO2 SERPL-SCNC: 19 MMOL/L — LOW (ref 22–31)
CREAT SERPL-MCNC: 0.95 MG/DL — SIGNIFICANT CHANGE UP (ref 0.5–1.3)
EGFR: 86 ML/MIN/1.73M2 — SIGNIFICANT CHANGE UP
EGFR: 86 ML/MIN/1.73M2 — SIGNIFICANT CHANGE UP
GLUCOSE BLDC GLUCOMTR-MCNC: 197 MG/DL — HIGH (ref 70–99)
GLUCOSE BLDC GLUCOMTR-MCNC: 258 MG/DL — HIGH (ref 70–99)
GLUCOSE SERPL-MCNC: 212 MG/DL — HIGH (ref 70–99)
HCT VFR BLD CALC: 36.1 % — LOW (ref 39–50)
HGB BLD-MCNC: 11.9 G/DL — LOW (ref 13–17)
MCHC RBC-ENTMCNC: 29.7 PG — SIGNIFICANT CHANGE UP (ref 27–34)
MCHC RBC-ENTMCNC: 33 G/DL — SIGNIFICANT CHANGE UP (ref 32–36)
MCV RBC AUTO: 90 FL — SIGNIFICANT CHANGE UP (ref 80–100)
NRBC # BLD AUTO: 0 K/UL — SIGNIFICANT CHANGE UP (ref 0–0)
NRBC # FLD: 0 K/UL — SIGNIFICANT CHANGE UP (ref 0–0)
NRBC BLD AUTO-RTO: 0 /100 WBCS — SIGNIFICANT CHANGE UP (ref 0–0)
PLATELET # BLD AUTO: 255 K/UL — SIGNIFICANT CHANGE UP (ref 150–400)
PMV BLD: 10 FL — SIGNIFICANT CHANGE UP (ref 7–13)
POTASSIUM SERPL-MCNC: 4.2 MMOL/L — SIGNIFICANT CHANGE UP (ref 3.5–5.3)
POTASSIUM SERPL-SCNC: 4.2 MMOL/L — SIGNIFICANT CHANGE UP (ref 3.5–5.3)
RBC # BLD: 4.01 M/UL — LOW (ref 4.2–5.8)
RBC # FLD: 13.5 % — SIGNIFICANT CHANGE UP (ref 10.3–14.5)
SODIUM SERPL-SCNC: 134 MMOL/L — LOW (ref 135–145)
WBC # BLD: 9.39 K/UL — SIGNIFICANT CHANGE UP (ref 3.8–10.5)
WBC # FLD AUTO: 9.39 K/UL — SIGNIFICANT CHANGE UP (ref 3.8–10.5)

## 2025-07-15 RX ORDER — SIMETHICONE 80 MG
80 TABLET,CHEWABLE ORAL
Refills: 0 | Status: DISCONTINUED | OUTPATIENT
Start: 2025-07-15 | End: 2025-07-15

## 2025-07-15 RX ORDER — POLYETHYLENE GLYCOL 3350 17 G/17G
17 POWDER, FOR SOLUTION ORAL
Qty: 0 | Refills: 0 | DISCHARGE
Start: 2025-07-15

## 2025-07-15 RX ORDER — TRAMADOL HYDROCHLORIDE 50 MG/1
1 TABLET, FILM COATED ORAL
Qty: 28 | Refills: 0
Start: 2025-07-15 | End: 2025-07-21

## 2025-07-15 RX ORDER — SENNA 187 MG
2 TABLET ORAL
Qty: 14 | Refills: 0
Start: 2025-07-15 | End: 2025-07-21

## 2025-07-15 RX ORDER — OXYCODONE HYDROCHLORIDE 30 MG/1
1 TABLET ORAL
Qty: 42 | Refills: 0
Start: 2025-07-15 | End: 2025-07-21

## 2025-07-15 RX ORDER — ACETAMINOPHEN 500 MG/5ML
3 LIQUID (ML) ORAL
Qty: 126 | Refills: 0
Start: 2025-07-15 | End: 2025-07-28

## 2025-07-15 RX ORDER — NALOXONE HYDROCHLORIDE 0.4 MG/ML
1 INJECTION, SOLUTION INTRAMUSCULAR; INTRAVENOUS; SUBCUTANEOUS
Qty: 1 | Refills: 0
Start: 2025-07-15

## 2025-07-15 RX ORDER — NAPROXEN SODIUM 275 MG
1 TABLET ORAL
Qty: 28 | Refills: 0
Start: 2025-07-15 | End: 2025-07-28

## 2025-07-15 RX ORDER — ASPIRIN 325 MG
1 TABLET ORAL
Qty: 84 | Refills: 0
Start: 2025-07-15 | End: 2025-08-25

## 2025-07-15 RX ADMIN — INSULIN LISPRO 1: 100 INJECTION, SOLUTION INTRAVENOUS; SUBCUTANEOUS at 08:10

## 2025-07-15 RX ADMIN — KETOROLAC TROMETHAMINE 15 MILLIGRAM(S): 30 INJECTION, SOLUTION INTRAMUSCULAR; INTRAVENOUS at 09:42

## 2025-07-15 RX ADMIN — INSULIN LISPRO 3: 100 INJECTION, SOLUTION INTRAVENOUS; SUBCUTANEOUS at 12:23

## 2025-07-15 RX ADMIN — Medication 81 MILLIGRAM(S): at 05:30

## 2025-07-15 RX ADMIN — Medication 100 MILLIGRAM(S): at 05:30

## 2025-07-15 RX ADMIN — KETOROLAC TROMETHAMINE 15 MILLIGRAM(S): 30 INJECTION, SOLUTION INTRAMUSCULAR; INTRAVENOUS at 03:09

## 2025-07-15 RX ADMIN — KETOROLAC TROMETHAMINE 15 MILLIGRAM(S): 30 INJECTION, SOLUTION INTRAMUSCULAR; INTRAVENOUS at 10:42

## 2025-07-15 RX ADMIN — Medication 40 MILLIGRAM(S): at 05:31

## 2025-07-15 RX ADMIN — Medication 80 MILLIGRAM(S): at 08:10

## 2025-07-15 RX ADMIN — Medication 400 MILLIGRAM(S): at 05:30

## 2025-07-15 RX ADMIN — Medication 500 MILLILITER(S): at 05:32

## 2025-07-15 RX ADMIN — TAMSULOSIN HYDROCHLORIDE 0.4 MILLIGRAM(S): 0.4 CAPSULE ORAL at 05:31

## 2025-07-15 NOTE — PROGRESS NOTE ADULT - SUBJECTIVE AND OBJECTIVE BOX
Orthopedic Progress Note    Patient is s/p left total knee arthroplasty, POD # 1  Patient seen and examined at bedside.    Vital Signs Last 24 Hrs  T(C): 36.6 (15 Jul 2025 04:26), Max: 36.8 (14 Jul 2025 09:38)  T(F): 97.8 (15 Jul 2025 04:26), Max: 98.2 (14 Jul 2025 09:38)  HR: 63 (15 Jul 2025 04:26) (58 - 86)  BP: 134/84 (15 Jul 2025 04:26) (102/55 - 162/73)  BP(mean): 104 (14 Jul 2025 15:50) (72 - 105)  RR: 18 (15 Jul 2025 04:26) (15 - 19)  SpO2: 98% (15 Jul 2025 04:26) (96% - 100%)    Parameters below as of 15 Jul 2025 04:26  Patient On (Oxygen Delivery Method): room air    Exam:  Gen: No acute distress  LLE: ACE clean, dry, and intact  Motor intact EHL/FHL/TA/GS  SILT in the distal extremity  + DP pulse  BLE: Calves soft and nontender

## 2025-07-15 NOTE — DISCHARGE NOTE NURSING/CASE MANAGEMENT/SOCIAL WORK - PATIENT PORTAL LINK FT
You can access the FollowMyHealth Patient Portal offered by [facility] by registering at the following website: [email or web address]. By joining [employer]’s FollowMyHealth portal, you will also be able to view your health information using other applications (apps) compatible with our system.

## 2025-07-15 NOTE — DISCHARGE NOTE NURSING/CASE MANAGEMENT/SOCIAL WORK - FINANCIAL ASSISTANCE
[facility] provides services at a reduced cost to those who are determined to be eligible through [facility]’s financial assistance program. Information regarding [facility]’s financial assistance program can be found by going to [email or web address] or by calling [phone].

## 2025-07-15 NOTE — DISCHARGE NOTE NURSING/CASE MANAGEMENT/SOCIAL WORK - NSDCPEFALRISK_GEN_ALL_CORE
For information on Fall & Injury Prevention, visit: [email or web address] OR  [email or web address] OR  https://www.cdc.gov/steadi/patient.html

## 2025-07-15 NOTE — OCCUPATIONAL THERAPY INITIAL EVALUATION ADULT - ADDITIONAL COMMENTS
Pt lives in a private house with his wife with 4 PERRY +HRs, 1 flight inside to bedroom. Pt states he was independent with functional mobility without an AD, wife assisted with ADLs as needed. Pt owns a rolling walker, raised toilet seated and commode.

## 2025-07-15 NOTE — PROGRESS NOTE ADULT - ASSESSMENT
A/P: 69 y/o male s/p left total knee arthroplasty, POD # 1    - Pain control/analgesia  - DVT ppx: Aspirin, SCDs  - PT/OT   - WBAT LLE  - OOB  - Incentive spirometer  - GI ppx  - Bowel regimen  - Follow up AM labs  - Notify ortho for questions  - Dispo: PT recommended home with home PT, pending PT clearance    Twila Minaya PA-C  Orthopedic Surgery Team  Team Pager #[phone]

## 2025-07-16 PROCEDURE — 97530 THERAPEUTIC ACTIVITIES: CPT

## 2025-07-16 PROCEDURE — 73562 X-RAY EXAM OF KNEE 3: CPT

## 2025-07-16 PROCEDURE — 97165 OT EVAL LOW COMPLEX 30 MIN: CPT

## 2025-07-16 PROCEDURE — C1889: CPT

## 2025-07-16 PROCEDURE — 97116 GAIT TRAINING THERAPY: CPT

## 2025-07-16 PROCEDURE — C1776: CPT

## 2025-07-16 PROCEDURE — S2900: CPT

## 2025-07-16 PROCEDURE — 85027 COMPLETE CBC AUTOMATED: CPT

## 2025-07-16 PROCEDURE — C1713: CPT

## 2025-07-16 PROCEDURE — 82962 GLUCOSE BLOOD TEST: CPT

## 2025-07-16 PROCEDURE — 0055T BONE SRGRY CMPTR CT/MRI IMAG: CPT

## 2025-07-16 PROCEDURE — 80048 BASIC METABOLIC PNL TOTAL CA: CPT

## 2025-07-16 PROCEDURE — 27447 TOTAL KNEE ARTHROPLASTY: CPT | Mod: LT

## 2025-07-16 PROCEDURE — 97161 PT EVAL LOW COMPLEX 20 MIN: CPT

## 2025-07-21 ENCOUNTER — NON-APPOINTMENT (OUTPATIENT)
Age: 70
End: 2025-07-21

## 2025-07-28 ENCOUNTER — APPOINTMENT (OUTPATIENT)
Dept: ORTHOPEDIC SURGERY | Facility: CLINIC | Age: 70
End: 2025-07-28
Payer: MEDICARE

## 2025-07-28 DIAGNOSIS — Z96.652 PRESENCE OF LEFT ARTIFICIAL KNEE JOINT: ICD-10-CM

## 2025-07-28 PROCEDURE — 99024 POSTOP FOLLOW-UP VISIT: CPT

## 2025-07-28 PROCEDURE — 73562 X-RAY EXAM OF KNEE 3: CPT | Mod: LT

## 2025-08-18 ENCOUNTER — NON-APPOINTMENT (OUTPATIENT)
Age: 70
End: 2025-08-18

## (undated) DEVICE — ELCTR PLASMA BLADE X 3.0S WIDE TIP

## (undated) DEVICE — TUBING SUCTION 20FT

## (undated) DEVICE — WARMING BLANKET UPPER ADULT

## (undated) DEVICE — DRSG DERMABOND 0.7ML

## (undated) DEVICE — SYR LUER LOK 20CC

## (undated) DEVICE — POSITIONER FOAM EGG CRATE ULNAR 2PCS (PINK)

## (undated) DEVICE — DRSG MEPILEX 10 X 30CM (4 X 12") AG

## (undated) DEVICE — SUT POLYSORB 1-0 36" GS-21 UNDYED

## (undated) DEVICE — Device

## (undated) DEVICE — SUT POLYSORB 2-0 30" GS-21 UNDYED

## (undated) DEVICE — MAKO BLADE NARROW

## (undated) DEVICE — SPECIMEN CONTAINER 100ML

## (undated) DEVICE — SOL IRR POUR NS 0.9% 500ML

## (undated) DEVICE — WOUND IRR IRRISEPT W 0.5 CHG

## (undated) DEVICE — VENODYNE/SCD SLEEVE CALF MEDIUM

## (undated) DEVICE — POSITIONER CARDIAC BUMP

## (undated) DEVICE — SAW BLADE STRYKER SAGITTAL DUAL CUT 64X35X.89MM

## (undated) DEVICE — HOOD T7 NON-PEELAWAY

## (undated) DEVICE — DRAPE 3/4 SHEET W REINFORCEMENT 56X77"

## (undated) DEVICE — NDL HYPO SAFE 22G X 1.5" (BLACK)

## (undated) DEVICE — SOL IRR POUR H2O 1000ML

## (undated) DEVICE — MAKO VIZADISC KNEE TRACKING KIT

## (undated) DEVICE — SUT PDO 2 1/2 CIRCLE 40MM NDL 45CM

## (undated) DEVICE — SUT QUILL MONODERM 2-0 3/8 CIRCLE 45CM

## (undated) DEVICE — PACK TOTAL KNEE (2 PACKS)

## (undated) DEVICE — POSITIONER FOAM HEADREST (PINK)

## (undated) DEVICE — SUT QUILL PDO 0 45CM 36MM

## (undated) DEVICE — TOURNIQUET CUFF 34" DUAL PORT W PLC